# Patient Record
Sex: MALE | Race: WHITE | NOT HISPANIC OR LATINO | Employment: FULL TIME | URBAN - METROPOLITAN AREA
[De-identification: names, ages, dates, MRNs, and addresses within clinical notes are randomized per-mention and may not be internally consistent; named-entity substitution may affect disease eponyms.]

---

## 2017-01-30 ENCOUNTER — CLINICAL SUPPORT (OUTPATIENT)
Dept: ELECTROPHYSIOLOGY | Facility: CLINIC | Age: 62
End: 2017-01-30
Payer: COMMERCIAL

## 2017-01-30 DIAGNOSIS — Z95.0 CARDIAC PACEMAKER IN SITU: ICD-10-CM

## 2017-01-30 DIAGNOSIS — I44.1 MOBITZ TYPE II ATRIOVENTRICULAR BLOCK: ICD-10-CM

## 2017-01-30 PROCEDURE — 93296 REM INTERROG EVL PM/IDS: CPT | Mod: PBBFAC | Performed by: INTERNAL MEDICINE

## 2017-01-30 PROCEDURE — 93294 REM INTERROG EVL PM/LDLS PM: CPT | Mod: S$GLB,,, | Performed by: INTERNAL MEDICINE

## 2017-03-06 ENCOUNTER — TELEPHONE (OUTPATIENT)
Dept: FAMILY MEDICINE | Facility: CLINIC | Age: 62
End: 2017-03-06

## 2017-03-06 DIAGNOSIS — Z00.00 ROUTINE MEDICAL EXAM: Primary | ICD-10-CM

## 2017-03-06 NOTE — TELEPHONE ENCOUNTER
----- Message from Sharmaine Barlow sent at 3/6/2017 11:13 AM CST -----  Patient would like annual blood work prior to appt 4/3/17. Please call at [phone] Thank you!

## 2017-03-23 ENCOUNTER — LAB VISIT (OUTPATIENT)
Dept: LAB | Facility: HOSPITAL | Age: 62
End: 2017-03-23
Attending: INTERNAL MEDICINE
Payer: COMMERCIAL

## 2017-03-23 DIAGNOSIS — Z00.00 ROUTINE MEDICAL EXAM: ICD-10-CM

## 2017-03-23 LAB
ALBUMIN SERPL BCP-MCNC: 3.9 G/DL
ALP SERPL-CCNC: 89 U/L
ALT SERPL W/O P-5'-P-CCNC: 25 U/L
ANION GAP SERPL CALC-SCNC: 11 MMOL/L
AST SERPL-CCNC: 25 U/L
BASOPHILS # BLD AUTO: 0.03 K/UL
BASOPHILS NFR BLD: 0.6 %
BILIRUB SERPL-MCNC: 1 MG/DL
BUN SERPL-MCNC: 11 MG/DL
CALCIUM SERPL-MCNC: 9.6 MG/DL
CHLORIDE SERPL-SCNC: 105 MMOL/L
CHOLEST/HDLC SERPL: 2.7 {RATIO}
CO2 SERPL-SCNC: 25 MMOL/L
COMPLEXED PSA SERPL-MCNC: 1.3 NG/ML
CREAT SERPL-MCNC: 1.1 MG/DL
DIFFERENTIAL METHOD: NORMAL
EOSINOPHIL # BLD AUTO: 0.2 K/UL
EOSINOPHIL NFR BLD: 4.4 %
ERYTHROCYTE [DISTWIDTH] IN BLOOD BY AUTOMATED COUNT: 13.8 %
EST. GFR  (AFRICAN AMERICAN): >60 ML/MIN/1.73 M^2
EST. GFR  (NON AFRICAN AMERICAN): >60 ML/MIN/1.73 M^2
GLUCOSE SERPL-MCNC: 101 MG/DL
HCT VFR BLD AUTO: 46.8 %
HDL/CHOLESTEROL RATIO: 37.6 %
HDLC SERPL-MCNC: 194 MG/DL
HDLC SERPL-MCNC: 73 MG/DL
HGB BLD-MCNC: 16 G/DL
LDLC SERPL CALC-MCNC: 95.2 MG/DL
LYMPHOCYTES # BLD AUTO: 1.6 K/UL
LYMPHOCYTES NFR BLD: 28.7 %
MCH RBC QN AUTO: 30.4 PG
MCHC RBC AUTO-ENTMCNC: 34.2 %
MCV RBC AUTO: 89 FL
MONOCYTES # BLD AUTO: 0.6 K/UL
MONOCYTES NFR BLD: 10.7 %
NEUTROPHILS # BLD AUTO: 3 K/UL
NEUTROPHILS NFR BLD: 55.4 %
NONHDLC SERPL-MCNC: 121 MG/DL
PLATELET # BLD AUTO: 223 K/UL
PMV BLD AUTO: 11.8 FL
POTASSIUM SERPL-SCNC: 4.7 MMOL/L
PROT SERPL-MCNC: 7.5 G/DL
RBC # BLD AUTO: 5.26 M/UL
SODIUM SERPL-SCNC: 141 MMOL/L
TRIGL SERPL-MCNC: 129 MG/DL
TSH SERPL DL<=0.005 MIU/L-ACNC: 0.94 UIU/ML
WBC # BLD AUTO: 5.43 K/UL

## 2017-03-23 PROCEDURE — 84443 ASSAY THYROID STIM HORMONE: CPT

## 2017-03-23 PROCEDURE — 80061 LIPID PANEL: CPT

## 2017-03-23 PROCEDURE — 84153 ASSAY OF PSA TOTAL: CPT

## 2017-03-23 PROCEDURE — 85025 COMPLETE CBC W/AUTO DIFF WBC: CPT

## 2017-03-23 PROCEDURE — 36415 COLL VENOUS BLD VENIPUNCTURE: CPT | Mod: PO

## 2017-03-23 PROCEDURE — 80053 COMPREHEN METABOLIC PANEL: CPT

## 2017-04-03 ENCOUNTER — OFFICE VISIT (OUTPATIENT)
Dept: FAMILY MEDICINE | Facility: CLINIC | Age: 62
End: 2017-04-03
Payer: COMMERCIAL

## 2017-04-03 VITALS
BODY MASS INDEX: 21.87 KG/M2 | HEIGHT: 74 IN | TEMPERATURE: 99 F | SYSTOLIC BLOOD PRESSURE: 122 MMHG | DIASTOLIC BLOOD PRESSURE: 72 MMHG | OXYGEN SATURATION: 98 % | WEIGHT: 170.44 LBS | HEART RATE: 80 BPM

## 2017-04-03 DIAGNOSIS — Z12.5 SCREENING FOR PROSTATE CANCER: ICD-10-CM

## 2017-04-03 DIAGNOSIS — Z95.0 CARDIAC PACEMAKER: ICD-10-CM

## 2017-04-03 DIAGNOSIS — Z00.00 ROUTINE MEDICAL EXAM: Primary | ICD-10-CM

## 2017-04-03 DIAGNOSIS — I44.1 SECOND DEGREE AV BLOCK: ICD-10-CM

## 2017-04-03 DIAGNOSIS — Z87.19 HISTORY OF DIVERTICULITIS: ICD-10-CM

## 2017-04-03 DIAGNOSIS — E78.5 HYPERLIPIDEMIA, UNSPECIFIED HYPERLIPIDEMIA TYPE: ICD-10-CM

## 2017-04-03 DIAGNOSIS — K21.9 GASTROESOPHAGEAL REFLUX DISEASE, ESOPHAGITIS PRESENCE NOT SPECIFIED: ICD-10-CM

## 2017-04-03 PROCEDURE — 99396 PREV VISIT EST AGE 40-64: CPT | Mod: S$GLB,,, | Performed by: INTERNAL MEDICINE

## 2017-04-03 PROCEDURE — 99999 PR PBB SHADOW E&M-EST. PATIENT-LVL III: CPT | Mod: PBBFAC,,, | Performed by: INTERNAL MEDICINE

## 2017-04-03 NOTE — PROGRESS NOTES
Chief complaint: Physical exam, [facility] care    61-year-old white male new to me his PCP is retired.  From and health maintenance standpoint we reviewed his recent labs.  His colonoscopy was normal in 2013 with a 10 year follow-up since previous polyps were hyperplastic.  He has had episodes of diverticulitis with central lower abdominal pain and we discussed probably best for him to come to the clinic when he gets another episode so we can confirm that it is not other conditions.  He gets occasional reflux we are long conversation regarding Zantac as well as the have the physiology of reflux and reflux precautions.  His labs are unremarkable and his lipids are well controlled.  He has no angina symptoms.    ROS:   CONST: weight stable. EYES: no vision change. ENT: no sore throat. CV: no chest pain w/ exertion. RESP: no shortness of breath. GI: no nausea, vomiting, diarrhea. No dysphagia. : no urinary issues. MUSCULOSKELETAL: no new myalgias or arthralgias. SKIN: no new changes. NEURO: no focal deficits. PSYCH: no new issues. ENDOCRINE: no polyuria. HEME: no lymph nodes. ALLERGY: no general pruritis.          Past Medical History:   Diagnosis Date    2:1 Second degree AV block 3/5/2013    Cardiac pacemaker 3/6/2013    Heart block 3/6/13    S/p DC PM    Hyperlipidemia     Personal history of colonic polyps 12/30/2013    Normal 12/13 ---Repeat colonoscopy in 10 years for screening purposes. (Previous polyps were both hyperplastic)   Diverticulitis  Occasional GERD          Past Surgical History:   Procedure Laterality Date    inguinal hernia repair      x 2    INSERT / REPLACE / REMOVE PACEMAKER  3/6/13    S/p DC PM       Social History     Social History    Marital status:      Spouse name: N/A    Number of children: 3 boys in college    Years of education: N/A     Occupational History    realator     Social History Main Topics    Smoking status: Former Smoker     Types: Cigars     Quit date:  7/23/1973    Smokeless tobacco: Never Used    Alcohol use 6.0 oz/week     5 Glasses of wine, 5 Cans of beer per week      Comment: Daily    Drug use: No    Sexual activity: Not on file     Other Topics Concern    Not on file     Social History Narrative       family history includes Cancer in his father and mother; Diabetes in his mother.      Gen: no distress  EYES: conjunctiva clear, non-icteric, PERRL  ENT: nose clear, nasal mucosa normal, oropharynx clear and moist, teeth good  NECK:supple, thyroid non-palpable  RESP: effort is good, lungs clear  CV: heart RRR w/o murmur, gallops or rubs; no carotid bruits, no edema  GI: abdomen soft, non-distended, non-tender, no hepatosplenomegaly  MS: gait normal, no clubbing or cyanosis of the digits  SKIN: no rashes, warm to touch    Marcos was seen today for annual exam.    Diagnoses and all orders for this visit:    Routine medical exam, up-to-date on age appropriate cancer screening, remain active, he can call his insurance about the shingles vaccine, Pneumovax and so forth after 65    Screening for prostate cancer, normal PSA    Hyperlipidemia, unspecified hyperlipidemia type, controlled on statin    Gastroesophageal reflux disease, esophagitis presence not specified, Zantac and reflux precautions discussed    Cardiac pacemaker, stable    2:1 Second degree AV block    History of diverticulitis, discussed symptoms and if episodes are frequent and recurrent, there is a place for referral to colon rectal surgery

## 2017-05-02 ENCOUNTER — CLINICAL SUPPORT (OUTPATIENT)
Dept: ELECTROPHYSIOLOGY | Facility: CLINIC | Age: 62
End: 2017-05-02
Payer: COMMERCIAL

## 2017-05-02 DIAGNOSIS — I44.1 MOBITZ TYPE II ATRIOVENTRICULAR BLOCK: ICD-10-CM

## 2017-05-02 DIAGNOSIS — Z95.0 CARDIAC PACEMAKER IN SITU: ICD-10-CM

## 2017-05-02 PROCEDURE — 93296 REM INTERROG EVL PM/IDS: CPT | Mod: S$GLB,,, | Performed by: INTERNAL MEDICINE

## 2017-05-02 PROCEDURE — 93294 REM INTERROG EVL PM/LDLS PM: CPT | Mod: S$GLB,,, | Performed by: INTERNAL MEDICINE

## 2017-05-17 DIAGNOSIS — E78.5 HYPERLIPIDEMIA, UNSPECIFIED HYPERLIPIDEMIA TYPE: ICD-10-CM

## 2017-05-19 RX ORDER — SIMVASTATIN 40 MG/1
40 TABLET, FILM COATED ORAL DAILY
Qty: 90 TABLET | Refills: 4 | Status: SHIPPED | OUTPATIENT
Start: 2017-05-19 | End: 2018-05-21 | Stop reason: SDUPTHER

## 2017-08-07 DIAGNOSIS — I44.1 AV BLOCK, 2ND DEGREE: Primary | ICD-10-CM

## 2017-08-08 ENCOUNTER — CLINICAL SUPPORT (OUTPATIENT)
Dept: ELECTROPHYSIOLOGY | Facility: CLINIC | Age: 62
End: 2017-08-08
Payer: COMMERCIAL

## 2017-08-08 ENCOUNTER — OFFICE VISIT (OUTPATIENT)
Dept: ELECTROPHYSIOLOGY | Facility: CLINIC | Age: 62
End: 2017-08-08
Payer: COMMERCIAL

## 2017-08-08 ENCOUNTER — HOSPITAL ENCOUNTER (OUTPATIENT)
Dept: CARDIOLOGY | Facility: CLINIC | Age: 62
Discharge: HOME | End: 2017-08-08
Payer: COMMERCIAL

## 2017-08-08 VITALS
WEIGHT: 172.63 LBS | BODY MASS INDEX: 22.15 KG/M2 | SYSTOLIC BLOOD PRESSURE: 142 MMHG | DIASTOLIC BLOOD PRESSURE: 78 MMHG | HEIGHT: 74 IN | HEART RATE: 59 BPM

## 2017-08-08 DIAGNOSIS — Z95.0 CARDIAC PACEMAKER: ICD-10-CM

## 2017-08-08 DIAGNOSIS — I44.1 AV BLOCK, 2ND DEGREE: ICD-10-CM

## 2017-08-08 DIAGNOSIS — I44.1 SECOND DEGREE HEART BLOCK: ICD-10-CM

## 2017-08-08 DIAGNOSIS — I44.1 SECOND DEGREE AV BLOCK: Primary | ICD-10-CM

## 2017-08-08 DIAGNOSIS — Z95.0 CARDIAC PACEMAKER IN SITU: ICD-10-CM

## 2017-08-08 PROCEDURE — 3008F BODY MASS INDEX DOCD: CPT | Mod: S$GLB,,, | Performed by: INTERNAL MEDICINE

## 2017-08-08 PROCEDURE — 93000 ELECTROCARDIOGRAM COMPLETE: CPT | Mod: S$GLB,,, | Performed by: INTERNAL MEDICINE

## 2017-08-08 PROCEDURE — 99214 OFFICE O/P EST MOD 30 MIN: CPT | Mod: S$GLB,,, | Performed by: INTERNAL MEDICINE

## 2017-08-08 PROCEDURE — 93280 PM DEVICE PROGR EVAL DUAL: CPT | Mod: S$GLB,,, | Performed by: INTERNAL MEDICINE

## 2017-08-08 PROCEDURE — 99999 PR PBB SHADOW E&M-EST. PATIENT-LVL III: CPT | Mod: PBBFAC,,, | Performed by: INTERNAL MEDICINE

## 2017-08-08 NOTE — PROGRESS NOTES
Subjective:    Patient ID:  Marcos Elaine is a 61 y.o. male who presents for follow-up of av block second degree      61 yoM 2:1 AVB s/p DC PM here for long term follow up. No complaints no events since his last visit. The patient denies interval chest pain, sob, palpitations, syncope, near syncope.     Interval history: 24% RV pacing. The patient denies interval chest pain, sob, palpitations, syncope, near syncope.     Echo 2013:  CONCLUSIONS   1 - Normal left ventricular function (EF 65%).   2 - Trivial tricuspid regurgitation.   3 - Intermediate central venous pressure.     Past Medical History:  3/5/2013: 2:1 Second degree AV block  3/6/2013: Cardiac pacemaker  3/6/13: Heart block      Comment: S/p DC PM  No date: Hyperlipidemia  12/30/2013: Personal history of colonic polyps      Comment: Normal 12/13 ---Repeat colonoscopy in 10 years               for screening purposes. (Previous polyps were                both hyperplastic)    Past Surgical History:  No date: inguinal hernia repair      Comment: x 2  3/6/13: INSERT / REPLACE / REMOVE PACEMAKER      Comment: S/p DC PM    Social History    Marital status:              Spouse name:                       Years of education:                 Number of children:               Occupational History    None on file    Social History Main Topics    Smoking status: Former Smoker                                                                Packs/day: 0.00      Years: 0.00           Types: Cigars       Quit date: 7/23/1973    Smokeless tobacco: Never Used                        Alcohol use: Yes           6.0 oz/week       Glasses of wine: 5, Cans of beer: 5 per week       Comment: Daily    Drug use: No              Sexual activity: Not on file          Other Topics            Concern    None on file    Social History Narrative    None on file    Review of patient's family history indicates:    Diabetes                       Mother                    Cancer                          Mother                    Cancer                         Father                          Review of Systems   Constitution: Negative.   HENT: Negative.    Eyes: Negative.    Cardiovascular: Negative.  Negative for chest pain, dyspnea on exertion, irregular heartbeat, leg swelling, near-syncope, palpitations and syncope.   Respiratory: Negative.    Endocrine: Negative.    Hematologic/Lymphatic: Negative.    Skin: Negative.    Musculoskeletal: Negative.    Gastrointestinal: Negative.    Genitourinary: Negative.    Neurological: Negative.    Psychiatric/Behavioral: Negative.    Allergic/Immunologic: Negative.         Objective:    Physical Exam   Constitutional: He is oriented to person, place, and time. He appears well-developed and well-nourished. No distress.   HENT:   Head: Normocephalic and atraumatic.   Eyes: Conjunctivae and EOM are normal. Pupils are equal, round, and reactive to light. Right eye exhibits no discharge. Left eye exhibits no discharge.   Neck: Normal range of motion. Neck supple. No JVD present. No thyromegaly present.   Cardiovascular: Normal rate, regular rhythm and normal heart sounds.    No murmur heard.  Pulmonary/Chest: Effort normal and breath sounds normal. No respiratory distress. He has no wheezes.   L upper chest wound overlying generator   Abdominal: Soft. Bowel sounds are normal. He exhibits no distension. There is no tenderness.   Musculoskeletal: Normal range of motion. He exhibits no edema.   Neurological: He is alert and oriented to person, place, and time. No cranial nerve deficit.   Skin: Skin is warm and dry. No rash noted. He is not diaphoretic. No erythema.   Psychiatric: He has a normal mood and affect. His behavior is normal. Judgment and thought content normal.   Vitals reviewed.      ECG: NSR , RBBB      Assessment:       1. 2:1 Second degree AV block    2. Cardiac pacemaker         Plan:       61 yoM advanced AV block s/p DC PM here for routine  follow up. Normal DC PM function with no arrhythmias. I discussed routine device follow up including quarterly to bi-annual device checks for device function as well as yearly follow up in the EP clinic. The patient  was advised to call with any concerns regarding their device. Device clinic follow up as scheduled. RTC 1y

## 2017-08-21 DIAGNOSIS — Z95.0 CARDIAC PACEMAKER IN SITU: Primary | ICD-10-CM

## 2017-09-06 ENCOUNTER — PATIENT MESSAGE (OUTPATIENT)
Dept: ELECTROPHYSIOLOGY | Facility: CLINIC | Age: 62
End: 2017-09-06

## 2017-10-10 ENCOUNTER — TELEPHONE (OUTPATIENT)
Dept: ADMINISTRATIVE | Facility: HOSPITAL | Age: 62
End: 2017-10-10

## 2017-10-10 NOTE — TELEPHONE ENCOUNTER
Fax received [facility], [place] for Influenza Vaccine.  Updated health maintenance and sent to scanning.

## 2017-11-09 ENCOUNTER — CLINICAL SUPPORT (OUTPATIENT)
Dept: ELECTROPHYSIOLOGY | Facility: CLINIC | Age: 62
End: 2017-11-09
Payer: COMMERCIAL

## 2017-11-09 DIAGNOSIS — Z95.0 CARDIAC PACEMAKER IN SITU: ICD-10-CM

## 2017-11-10 PROCEDURE — 93294 REM INTERROG EVL PM/LDLS PM: CPT | Mod: ,,, | Performed by: INTERNAL MEDICINE

## 2018-01-19 ENCOUNTER — OFFICE VISIT (OUTPATIENT)
Dept: FAMILY MEDICINE | Facility: CLINIC | Age: 63
End: 2018-01-19
Payer: COMMERCIAL

## 2018-01-19 VITALS
BODY MASS INDEX: 21.93 KG/M2 | WEIGHT: 170.88 LBS | TEMPERATURE: 99 F | SYSTOLIC BLOOD PRESSURE: 126 MMHG | HEIGHT: 74 IN | HEART RATE: 75 BPM | DIASTOLIC BLOOD PRESSURE: 80 MMHG | OXYGEN SATURATION: 99 %

## 2018-01-19 DIAGNOSIS — E78.5 HYPERLIPIDEMIA, UNSPECIFIED HYPERLIPIDEMIA TYPE: ICD-10-CM

## 2018-01-19 DIAGNOSIS — Z86.010 PERSONAL HISTORY OF COLONIC POLYPS: ICD-10-CM

## 2018-01-19 DIAGNOSIS — J06.9 URI, ACUTE: Primary | ICD-10-CM

## 2018-01-19 PROCEDURE — 99214 OFFICE O/P EST MOD 30 MIN: CPT | Mod: S$GLB,,, | Performed by: INTERNAL MEDICINE

## 2018-01-19 PROCEDURE — 99999 PR PBB SHADOW E&M-EST. PATIENT-LVL III: CPT | Mod: PBBFAC,,, | Performed by: INTERNAL MEDICINE

## 2018-01-19 NOTE — PROGRESS NOTES
Chief complaint: Cough and congestion    62-year-old white male was exposed to the cold 3 days ago.  His wife had an illness similar previous.  He said some slight chills but nothing suspicious for fever no documented fever, no body aches.  He has some head congestion and some associated sneezing due to rhinitis and postnasal drip with associated cough.  He has not really taken any medications.    ROS:   CONST: weight stable. EYES: no vision change. ENT: no sore throat. CV: no chest pain w/ exertion. RESP: no shortness of breath. GI: no nausea, vomiting, diarrhea. No dysphagia. : no urinary issues. MUSCULOSKELETAL: no new myalgias or arthralgias. SKIN: no new changes. NEURO: no focal deficits. PSYCH: no new issues. ENDOCRINE: no polyuria. HEME: no lymph nodes. ALLERGY: no general pruritis.          Past Medical History:   Diagnosis Date    2:1 Second degree AV block 3/5/2013    Cardiac pacemaker 3/6/2013    Heart block 3/6/13    S/p DC PM    Hyperlipidemia     Personal history of colonic polyps 12/30/2013    Normal 12/13 ---Repeat colonoscopy in 10 years for screening purposes. (Previous polyps were both hyperplastic)   Diverticulitis  Occasional GERD          Past Surgical History:   Procedure Laterality Date    inguinal hernia repair      x 2    INSERT / REPLACE / REMOVE PACEMAKER  3/6/13    S/p DC PM       Social History     Social History    Marital status:      Spouse name: N/A    Number of children: 3 boys in college    Years of education: N/A     Occupational History    realator     Social History Main Topics    Smoking status: Former Smoker     Types: Cigars     Quit date: 7/23/1973    Smokeless tobacco: Never Used    Alcohol use 6.0 oz/week     5 Glasses of wine, 5 Cans of beer per week      Comment: Daily    Drug use: No    Sexual activity: Not on file     Other Topics Concern    Not on file     Social History Narrative       family history includes Cancer in his father and  mother; Diabetes in his mother.      Gen: no distress  EYES: conjunctiva clear, non-icteric, PERRL  ENT: nose congested, nasal mucosa red, oropharynx slightly red and moist, teeth good  NECK:supple, thyroid non-palpable, no cervical lymph nodes  RESP: effort is good, lungs clear  CV: heart RRR w/o murmur, gallops or rubs; no carotid bruits, no edema  GI: abdomen soft, non-distended, non-tender  MS: gait normal, no clubbing or cyanosis of the digits  SKIN: no rashes, warm to touch, no sinus pain to touch    Marcos was seen today for cough, sinus problem and sneezing.    Diagnoses and all orders for this visit:    URI, acute , viral, minimal severity, discuss Claritin in the morning, Chlor-Trimeton at night else him for cough all of these are optional based on severity of symptoms.    Hyperlipidemia, unspecified hyperlipidemia type, labs reviewed, good control, yearly blood work will be due in March    Personal history of colonic polyps, colonoscopy report reviewed and apparently his prior polyps were hyperplastic so he can go 10 years from prior

## 2018-02-19 ENCOUNTER — CLINICAL SUPPORT (OUTPATIENT)
Dept: ELECTROPHYSIOLOGY | Facility: CLINIC | Age: 63
End: 2018-02-19
Payer: COMMERCIAL

## 2018-02-19 DIAGNOSIS — Z95.0 CARDIAC PACEMAKER IN SITU: ICD-10-CM

## 2018-02-19 PROCEDURE — 93296 REM INTERROG EVL PM/IDS: CPT | Mod: S$GLB,,, | Performed by: INTERNAL MEDICINE

## 2018-02-19 PROCEDURE — 93294 REM INTERROG EVL PM/LDLS PM: CPT | Mod: S$GLB,,, | Performed by: INTERNAL MEDICINE

## 2018-02-20 ENCOUNTER — TELEPHONE (OUTPATIENT)
Dept: FAMILY MEDICINE | Facility: CLINIC | Age: 63
End: 2018-02-20

## 2018-02-20 DIAGNOSIS — Z00.00 ROUTINE MEDICAL EXAM: Primary | ICD-10-CM

## 2018-02-20 NOTE — TELEPHONE ENCOUNTER
----- Message from Ruth Estrada sent at 2/16/2018  4:22 PM CST -----  Contact: Self  Pt requesting blood work before appt on 4/4/2018. [phone]

## 2018-02-28 ENCOUNTER — PATIENT MESSAGE (OUTPATIENT)
Dept: FAMILY MEDICINE | Facility: CLINIC | Age: 63
End: 2018-02-28

## 2018-02-28 ENCOUNTER — TELEPHONE (OUTPATIENT)
Dept: FAMILY MEDICINE | Facility: CLINIC | Age: 63
End: 2018-02-28

## 2018-02-28 NOTE — TELEPHONE ENCOUNTER
Attempted to contact patient to schedule labs appointment. No answer left message on voicemail to return call to clinic and will send message via my [facility].

## 2018-03-26 ENCOUNTER — LAB VISIT (OUTPATIENT)
Dept: LAB | Facility: HOSPITAL | Age: 63
End: 2018-03-26
Attending: INTERNAL MEDICINE
Payer: COMMERCIAL

## 2018-03-26 DIAGNOSIS — Z00.00 ROUTINE MEDICAL EXAM: ICD-10-CM

## 2018-03-26 LAB
ALBUMIN SERPL BCP-MCNC: 4.1 G/DL
ALP SERPL-CCNC: 80 U/L
ALT SERPL W/O P-5'-P-CCNC: 20 U/L
ANION GAP SERPL CALC-SCNC: 9 MMOL/L
AST SERPL-CCNC: 22 U/L
BASOPHILS # BLD AUTO: 0.02 K/UL
BASOPHILS NFR BLD: 0.4 %
BILIRUB SERPL-MCNC: 1 MG/DL
BUN SERPL-MCNC: 13 MG/DL
CALCIUM SERPL-MCNC: 9.7 MG/DL
CHLORIDE SERPL-SCNC: 103 MMOL/L
CHOLEST SERPL-MCNC: 175 MG/DL
CHOLEST/HDLC SERPL: 2.5 {RATIO}
CO2 SERPL-SCNC: 28 MMOL/L
COMPLEXED PSA SERPL-MCNC: 1.4 NG/ML
CREAT SERPL-MCNC: 1 MG/DL
DIFFERENTIAL METHOD: NORMAL
EOSINOPHIL # BLD AUTO: 0.1 K/UL
EOSINOPHIL NFR BLD: 2.4 %
ERYTHROCYTE [DISTWIDTH] IN BLOOD BY AUTOMATED COUNT: 13.8 %
EST. GFR  (AFRICAN AMERICAN): >60 ML/MIN/1.73 M^2
EST. GFR  (NON AFRICAN AMERICAN): >60 ML/MIN/1.73 M^2
GLUCOSE SERPL-MCNC: 110 MG/DL
HCT VFR BLD AUTO: 47.2 %
HDLC SERPL-MCNC: 70 MG/DL
HDLC SERPL: 40 %
HGB BLD-MCNC: 15.8 G/DL
IMM GRANULOCYTES # BLD AUTO: 0.01 K/UL
IMM GRANULOCYTES NFR BLD AUTO: 0.2 %
LDLC SERPL CALC-MCNC: 81.6 MG/DL
LYMPHOCYTES # BLD AUTO: 1.2 K/UL
LYMPHOCYTES NFR BLD: 24.7 %
MCH RBC QN AUTO: 29.8 PG
MCHC RBC AUTO-ENTMCNC: 33.5 G/DL
MCV RBC AUTO: 89 FL
MONOCYTES # BLD AUTO: 0.5 K/UL
MONOCYTES NFR BLD: 9.4 %
NEUTROPHILS # BLD AUTO: 3.2 K/UL
NEUTROPHILS NFR BLD: 62.9 %
NONHDLC SERPL-MCNC: 105 MG/DL
NRBC BLD-RTO: 0 /100 WBC
PLATELET # BLD AUTO: 228 K/UL
PMV BLD AUTO: 11.7 FL
POTASSIUM SERPL-SCNC: 4.4 MMOL/L
PROT SERPL-MCNC: 7.4 G/DL
RBC # BLD AUTO: 5.3 M/UL
SODIUM SERPL-SCNC: 140 MMOL/L
TRIGL SERPL-MCNC: 117 MG/DL
TSH SERPL DL<=0.005 MIU/L-ACNC: 1.06 UIU/ML
WBC # BLD AUTO: 5.02 K/UL

## 2018-03-26 PROCEDURE — 36415 COLL VENOUS BLD VENIPUNCTURE: CPT | Mod: PO

## 2018-03-26 PROCEDURE — 84443 ASSAY THYROID STIM HORMONE: CPT

## 2018-03-26 PROCEDURE — 80061 LIPID PANEL: CPT

## 2018-03-26 PROCEDURE — 80053 COMPREHEN METABOLIC PANEL: CPT

## 2018-03-26 PROCEDURE — 84153 ASSAY OF PSA TOTAL: CPT

## 2018-03-26 PROCEDURE — 85025 COMPLETE CBC W/AUTO DIFF WBC: CPT

## 2018-04-04 ENCOUNTER — OFFICE VISIT (OUTPATIENT)
Dept: FAMILY MEDICINE | Facility: CLINIC | Age: 63
End: 2018-04-04
Payer: COMMERCIAL

## 2018-04-04 VITALS
BODY MASS INDEX: 21.56 KG/M2 | HEIGHT: 74 IN | HEART RATE: 70 BPM | WEIGHT: 168 LBS | OXYGEN SATURATION: 99 % | TEMPERATURE: 99 F | DIASTOLIC BLOOD PRESSURE: 80 MMHG | SYSTOLIC BLOOD PRESSURE: 122 MMHG

## 2018-04-04 DIAGNOSIS — I44.1 SECOND DEGREE AV BLOCK: ICD-10-CM

## 2018-04-04 DIAGNOSIS — Z12.5 SCREENING FOR PROSTATE CANCER: ICD-10-CM

## 2018-04-04 DIAGNOSIS — K21.9 GASTROESOPHAGEAL REFLUX DISEASE, ESOPHAGITIS PRESENCE NOT SPECIFIED: ICD-10-CM

## 2018-04-04 DIAGNOSIS — E78.5 HYPERLIPIDEMIA, UNSPECIFIED HYPERLIPIDEMIA TYPE: ICD-10-CM

## 2018-04-04 DIAGNOSIS — Z95.0 CARDIAC PACEMAKER: ICD-10-CM

## 2018-04-04 DIAGNOSIS — Z87.19 HISTORY OF DIVERTICULITIS: ICD-10-CM

## 2018-04-04 DIAGNOSIS — Z86.010 PERSONAL HISTORY OF COLONIC POLYPS: ICD-10-CM

## 2018-04-04 DIAGNOSIS — Z00.00 ROUTINE MEDICAL EXAM: Primary | ICD-10-CM

## 2018-04-04 PROCEDURE — 99999 PR PBB SHADOW E&M-EST. PATIENT-LVL III: CPT | Mod: PBBFAC,,, | Performed by: INTERNAL MEDICINE

## 2018-04-04 PROCEDURE — 99396 PREV VISIT EST AGE 40-64: CPT | Mod: S$GLB,,, | Performed by: INTERNAL MEDICINE

## 2018-04-04 NOTE — PROGRESS NOTES
Chief complaint: Physical exam,     62-year-old white male.  From and health maintenance standpoint we reviewed his  labs.  His colonoscopy was normal in 2013 with a 10 year follow-up since previous polyps were hyperplastic.  He has had episodes of diverticulitis with central lower abdominal pain and we discussed probably best for him to come to the clinic when he gets another episode so we can confirm that it is not other conditions.  He gets occasional reflux we are long conversation regarding Zantac as well as the have the physiology of reflux and reflux precautions.  His labs are unremarkable and his lipids are well controlled.  He has no angina symptoms.    ROS:   CONST: weight stable. EYES: no vision change. ENT: no sore throat. CV: no chest pain w/ exertion. RESP: no shortness of breath. GI: no nausea, vomiting, diarrhea. No dysphagia. : no urinary issues. MUSCULOSKELETAL: no new myalgias or arthralgias. SKIN: no new changes. NEURO: no focal deficits. PSYCH: no new issues. ENDOCRINE: no polyuria. HEME: no lymph nodes. ALLERGY: no general pruritis.          Past Medical History:   Diagnosis Date    2:1 Second degree AV block 3/5/2013    Cardiac pacemaker 3/6/2013    Heart block 3/6/13    S/p DC PM    Hyperlipidemia     Personal history of colonic polyps 12/30/2013    Normal 12/13 ---Repeat colonoscopy in 10 years for screening purposes. 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      Gen: no distress  EYES: conjunctiva clear, non-icteric, PERRL  ENT: nose clear, nasal mucosa normal, oropharynx clear and moist, teeth good  NECK:supple, thyroid non-palpable  RESP: effort is good, lungs clear  CV: heart RRR w/o murmur, gallops or rubs; no carotid bruits, no edema  GI: abdomen soft, non-distended, non-tender, no hepatosplenomegaly  MS: gait normal, no clubbing or cyanosis of the digits  SKIN: no rashes, warm to touch    Marcos was seen today for annual exam.    Diagnoses and all orders for this visit:    Routine medical exam, up-to-date on age appropriate cancer screening, remain active, he can call his insurance about the shingles vaccine, Pneumovax and so forth after 65    Screening for prostate cancer, normal PSA    Hyperlipidemia, unspecified hyperlipidemia type, controlled on statin    Gastroesophageal reflux disease, esophagitis presence not specified, Zantac and reflux precautions discussed    Cardiac pacemaker, stable    2:1 Second degree AV block    History of diverticulitis, discussed symptoms and if episodes are frequent and recurrent, there is a place for referral to colon rectal surgery     "This note will not be shared with the patient."  "

## 2018-05-21 DIAGNOSIS — E78.5 HYPERLIPIDEMIA, UNSPECIFIED HYPERLIPIDEMIA TYPE: ICD-10-CM

## 2018-05-21 RX ORDER — SIMVASTATIN 40 MG/1
TABLET, FILM COATED ORAL
Qty: 90 TABLET | Refills: 1 | Status: SHIPPED | OUTPATIENT
Start: 2018-05-21 | End: 2018-11-13 | Stop reason: SDUPTHER

## 2018-05-23 ENCOUNTER — CLINICAL SUPPORT (OUTPATIENT)
Dept: ELECTROPHYSIOLOGY | Facility: CLINIC | Age: 63
End: 2018-05-23
Payer: COMMERCIAL

## 2018-05-23 DIAGNOSIS — Z95.0 CARDIAC PACEMAKER IN SITU: ICD-10-CM

## 2018-05-23 PROCEDURE — 93294 REM INTERROG EVL PM/LDLS PM: CPT | Mod: S$GLB,,, | Performed by: INTERNAL MEDICINE

## 2018-05-23 PROCEDURE — 93296 REM INTERROG EVL PM/IDS: CPT | Mod: S$GLB,,, | Performed by: INTERNAL MEDICINE

## 2018-05-30 DIAGNOSIS — I44.1 SECOND DEGREE AV BLOCK: ICD-10-CM

## 2018-05-30 DIAGNOSIS — Z95.0 CARDIAC PACEMAKER IN SITU: Primary | ICD-10-CM

## 2018-06-14 DIAGNOSIS — Z11.59 NEED FOR HEPATITIS C SCREENING TEST: ICD-10-CM

## 2018-08-24 ENCOUNTER — TELEPHONE (OUTPATIENT)
Dept: ELECTROPHYSIOLOGY | Facility: CLINIC | Age: 63
End: 2018-08-24

## 2018-08-24 DIAGNOSIS — I44.30 AV BLOCK: Primary | ICD-10-CM

## 2018-08-28 ENCOUNTER — CLINICAL SUPPORT (OUTPATIENT)
Dept: ELECTROPHYSIOLOGY | Facility: CLINIC | Age: 63
End: 2018-08-28
Attending: INTERNAL MEDICINE
Payer: COMMERCIAL

## 2018-08-28 ENCOUNTER — HOSPITAL ENCOUNTER (OUTPATIENT)
Dept: CARDIOLOGY | Facility: CLINIC | Age: 63
Discharge: HOME | End: 2018-08-28
Payer: COMMERCIAL

## 2018-08-28 ENCOUNTER — OFFICE VISIT (OUTPATIENT)
Dept: ELECTROPHYSIOLOGY | Facility: CLINIC | Age: 63
End: 2018-08-28
Payer: COMMERCIAL

## 2018-08-28 VITALS
WEIGHT: 175.06 LBS | HEART RATE: 62 BPM | BODY MASS INDEX: 22.47 KG/M2 | DIASTOLIC BLOOD PRESSURE: 84 MMHG | HEIGHT: 74 IN | SYSTOLIC BLOOD PRESSURE: 148 MMHG

## 2018-08-28 DIAGNOSIS — I44.1 SECOND DEGREE AV BLOCK: ICD-10-CM

## 2018-08-28 DIAGNOSIS — Z95.0 CARDIAC PACEMAKER: Primary | ICD-10-CM

## 2018-08-28 DIAGNOSIS — Z95.0 CARDIAC PACEMAKER IN SITU: ICD-10-CM

## 2018-08-28 DIAGNOSIS — I44.30 AV BLOCK: ICD-10-CM

## 2018-08-28 PROCEDURE — 99214 OFFICE O/P EST MOD 30 MIN: CPT | Mod: S$GLB,,, | Performed by: INTERNAL MEDICINE

## 2018-08-28 PROCEDURE — 99999 PR PBB SHADOW E&M-EST. PATIENT-LVL II: CPT | Mod: PBBFAC,,, | Performed by: INTERNAL MEDICINE

## 2018-08-28 PROCEDURE — 93280 PM DEVICE PROGR EVAL DUAL: CPT | Mod: ,,, | Performed by: INTERNAL MEDICINE

## 2018-08-28 PROCEDURE — 93000 ELECTROCARDIOGRAM COMPLETE: CPT | Mod: S$GLB,,, | Performed by: INTERNAL MEDICINE

## 2018-08-28 PROCEDURE — 3008F BODY MASS INDEX DOCD: CPT | Mod: CPTII,S$GLB,, | Performed by: INTERNAL MEDICINE

## 2018-08-28 NOTE — PROGRESS NOTES
Mr. Elaine is a patient of Dr. Hanson and was last seen in clinic 8/8/2017.      Subjective:   Patient ID:  Marcos Elaine is a 62 y.o. male who presents for follow-up of No chief complaint on file.  .     HPI:    Mr. Elaine is a 62 y.o. male with AVB, PPM (2018) here for annual follow up.     Background:    History of 2:1 AVB s/p DC PM 3/6/35235 here for long term follow up.   At his last clinic appt 8/2017: 24% RV pacing.     Update (08/28/2018):    Today he feels well. No cardiac complaints. Mr. Elaine denies chest pain with exertion or at rest, palpitations, SOB, PAUL, dizziness, or syncope.  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     Device Interrogation (8/28/2018) reveals an intrinsic SR with AVB with stable lead and device function. 20 seconds of AMS noted. He paces 0% in the RA and 94% in the RV. Estimated battery longevity 8-9 years.     I have personally reviewed the patient's EKG today, which shows ASVP at 62bpm. IL interval is 172. QRS is 184. QTc is 499.    Recent Cardiac Tests:    2D Echo (3/5/2013):  CONCLUSIONS     1 - Normal left ventricular function (EF 65%).     2 - Trivial tricuspid regurgitation.     3 - Intermediate central venous pressure.     Current Outpatient Medications   Medication Sig    CALCIUM POLYCARBOPHIL (KONSYL FIBER ORAL) Take by mouth.    glucosamine-chondroitin 500-400 mg tablet Take 1 tablet by mouth 3 (three) times daily.    multivitamin capsule Take 1 capsule by mouth once daily.    simvastatin (ZOCOR) 40 MG tablet TAKE ONE TABLET BY MOUTH EVERY DAY     No current facility-administered medications for this visit.      Review of Systems   Constitution: Negative for malaise/fatigue.   Cardiovascular: Negative for chest pain, dyspnea on exertion, irregular heartbeat, leg swelling and palpitations.   Respiratory: Negative for shortness of breath.    Hematologic/Lymphatic: Negative for  "bleeding problem.   Skin: Negative for rash.   Musculoskeletal: Negative for myalgias.   Gastrointestinal: Negative for hematemesis, hematochezia and nausea.   Genitourinary: Negative for hematuria.   Neurological: Negative for light-headedness.   Psychiatric/Behavioral: Negative for altered mental status.   Allergic/Immunologic: Negative for persistent infections.     Objective:          BP (!) 148/84   Pulse 62   Ht 6' 2" (1.88 m)   Wt 79.4 kg (175 lb 0.7 oz)   BMI 22.47 kg/m²     Physical Exam   Constitutional: He is oriented to person, place, and time. He appears well-developed and well-nourished.   HENT:   Head: Normocephalic.   Nose: Nose normal.   Eyes: Pupils are equal, round, and reactive to light.   Cardiovascular: Normal rate, regular rhythm, S1 normal and S2 normal.   No murmur heard.  Pulses:       Radial pulses are 2+ on the right side, and 2+ on the left side.   Pulmonary/Chest: Breath sounds normal. No respiratory distress.   Device to LUCW   Abdominal: Normal appearance.   Musculoskeletal: Normal range of motion. He exhibits no edema.   Neurological: He is alert and oriented to person, place, and time.   Skin: Skin is warm and dry. No erythema.   Psychiatric: He has a normal mood and affect. His speech is normal and behavior is normal.   Nursing note and vitals reviewed.    Lab Results   Component Value Date     03/26/2018    K 4.4 03/26/2018    MG 2.5 03/05/2013    BUN 13 03/26/2018    CREATININE 1.0 03/26/2018    ALT 20 03/26/2018    AST 22 03/26/2018    HGB 15.8 03/26/2018    HCT 47.2 03/26/2018    TSH 1.058 03/26/2018    LDLCALC 81.6 03/26/2018           Assessment:     1. Cardiac pacemaker    2. 2:1 Second degree AV block      Plan:     In summary, Mr. Elaine is a 62 y.o. male with AVB, PPM here for annual follow up.   Mr. Elaine is doing well from a device perspective with stable lead and device function. No noise seen on device despite his receiving external shocks working with " electrical wiring.  20 seconds of AMS noted (likely AT per egram). His V pacing burden has increased significantly over the past year, from 24% to 94%.'  Will obtain echo to evaluate LV function.    Schedule echo.  Continue current medication regimen and device settings.   Follow up in device clinic as scheduled.   Follow up in EP clinic in 1 year, sooner as needed.     *Case was discussed with Dr. Hanson, who also counseled the patient.*    Follow-up in about 1 year (around 8/28/2019).    ------------------------------------------------------------------    SERENA Carbajal, NP-C  [facility]

## 2018-08-29 ENCOUNTER — HOSPITAL ENCOUNTER (OUTPATIENT)
Dept: CARDIOLOGY | Facility: CLINIC | Age: 63
Discharge: HOME | End: 2018-08-29
Attending: NURSE PRACTITIONER
Payer: COMMERCIAL

## 2018-08-29 DIAGNOSIS — Z95.0 CARDIAC PACEMAKER: ICD-10-CM

## 2018-08-29 DIAGNOSIS — I44.1 SECOND DEGREE AV BLOCK: ICD-10-CM

## 2018-08-29 LAB
AORTIC VALVE REGURGITATION: NORMAL
MITRAL VALVE REGURGITATION: NORMAL
RETIRED EF AND QEF - SEE NOTES: 50 (ref 55–65)
TRICUSPID VALVE REGURGITATION: NORMAL

## 2018-08-29 PROCEDURE — 93306 TTE W/DOPPLER COMPLETE: CPT | Mod: S$GLB,,, | Performed by: INTERNAL MEDICINE

## 2018-11-13 DIAGNOSIS — E78.5 HYPERLIPIDEMIA, UNSPECIFIED HYPERLIPIDEMIA TYPE: ICD-10-CM

## 2018-11-14 RX ORDER — SIMVASTATIN 40 MG/1
TABLET, FILM COATED ORAL
Qty: 90 TABLET | Refills: 1 | Status: SHIPPED | OUTPATIENT
Start: 2018-11-14 | End: 2019-04-08 | Stop reason: SDUPTHER

## 2018-12-03 ENCOUNTER — CLINICAL SUPPORT (OUTPATIENT)
Dept: CARDIOLOGY | Facility: HOSPITAL | Age: 63
End: 2018-12-03
Attending: INTERNAL MEDICINE
Payer: COMMERCIAL

## 2018-12-03 DIAGNOSIS — Z95.0 CARDIAC PACEMAKER IN SITU: ICD-10-CM

## 2018-12-03 PROCEDURE — 93296 REM INTERROG EVL PM/IDS: CPT

## 2018-12-03 PROCEDURE — 93294 REM INTERROG EVL PM/LDLS PM: CPT | Mod: ,,, | Performed by: INTERNAL MEDICINE

## 2018-12-16 ENCOUNTER — OFFICE VISIT (OUTPATIENT)
Dept: URGENT CARE | Facility: CLINIC | Age: 63
End: 2018-12-16
Payer: COMMERCIAL

## 2018-12-16 VITALS
HEART RATE: 92 BPM | TEMPERATURE: 98 F | WEIGHT: 175 LBS | SYSTOLIC BLOOD PRESSURE: 101 MMHG | BODY MASS INDEX: 22.47 KG/M2 | DIASTOLIC BLOOD PRESSURE: 65 MMHG | OXYGEN SATURATION: 92 %

## 2018-12-16 DIAGNOSIS — R50.9 FEVER, UNSPECIFIED FEVER CAUSE: ICD-10-CM

## 2018-12-16 DIAGNOSIS — J18.9 PNEUMONIA DUE TO INFECTIOUS ORGANISM, UNSPECIFIED LATERALITY, UNSPECIFIED PART OF LUNG: Primary | ICD-10-CM

## 2018-12-16 DIAGNOSIS — R06.02 SOB (SHORTNESS OF BREATH): ICD-10-CM

## 2018-12-16 DIAGNOSIS — E86.0 DEHYDRATION: ICD-10-CM

## 2018-12-16 LAB
CTP QC/QA: YES
FLUAV AG NPH QL: NEGATIVE
FLUBV AG NPH QL: NEGATIVE

## 2018-12-16 PROCEDURE — 1101F PT FALLS ASSESS-DOCD LE1/YR: CPT | Mod: CPTII,S$GLB,, | Performed by: NURSE PRACTITIONER

## 2018-12-16 PROCEDURE — 71046 X-RAY EXAM CHEST 2 VIEWS: CPT | Mod: S$GLB,,, | Performed by: RADIOLOGY

## 2018-12-16 PROCEDURE — 87804 INFLUENZA ASSAY W/OPTIC: CPT | Mod: 59,QW,S$GLB, | Performed by: NURSE PRACTITIONER

## 2018-12-16 PROCEDURE — 96372 THER/PROPH/DIAG INJ SC/IM: CPT | Mod: 59,S$GLB,, | Performed by: NURSE PRACTITIONER

## 2018-12-16 PROCEDURE — 94640 AIRWAY INHALATION TREATMENT: CPT | Mod: 59,S$GLB,, | Performed by: NURSE PRACTITIONER

## 2018-12-16 PROCEDURE — 99214 OFFICE O/P EST MOD 30 MIN: CPT | Mod: 25,S$GLB,, | Performed by: NURSE PRACTITIONER

## 2018-12-16 PROCEDURE — 3008F BODY MASS INDEX DOCD: CPT | Mod: CPTII,S$GLB,, | Performed by: NURSE PRACTITIONER

## 2018-12-16 RX ORDER — SODIUM CHLORIDE 9 MG/ML
INJECTION, SOLUTION INTRAVENOUS
Status: COMPLETED | OUTPATIENT
Start: 2018-12-16 | End: 2018-12-16

## 2018-12-16 RX ORDER — ALBUTEROL SULFATE 90 UG/1
2 AEROSOL, METERED RESPIRATORY (INHALATION) EVERY 6 HOURS PRN
Qty: 18 G | Refills: 0 | Status: SHIPPED | OUTPATIENT
Start: 2018-12-16 | End: 2019-04-08

## 2018-12-16 RX ORDER — PROMETHAZINE HYDROCHLORIDE AND DEXTROMETHORPHAN HYDROBROMIDE 6.25; 15 MG/5ML; MG/5ML
5 SYRUP ORAL EVERY 12 HOURS PRN
Qty: 180 ML | Refills: 0 | Status: SHIPPED | OUTPATIENT
Start: 2018-12-16 | End: 2018-12-26

## 2018-12-16 RX ORDER — IPRATROPIUM BROMIDE 0.5 MG/2.5ML
0.5 SOLUTION RESPIRATORY (INHALATION)
Status: COMPLETED | OUTPATIENT
Start: 2018-12-16 | End: 2018-12-16

## 2018-12-16 RX ORDER — ALBUTEROL SULFATE 0.83 MG/ML
2.5 SOLUTION RESPIRATORY (INHALATION)
Status: COMPLETED | OUTPATIENT
Start: 2018-12-16 | End: 2018-12-16

## 2018-12-16 RX ORDER — LEVOFLOXACIN 750 MG/1
750 TABLET ORAL DAILY
Qty: 7 TABLET | Refills: 0 | Status: SHIPPED | OUTPATIENT
Start: 2018-12-16 | End: 2018-12-23

## 2018-12-16 RX ORDER — IPRATROPIUM BROMIDE AND ALBUTEROL SULFATE 2.5; .5 MG/3ML; MG/3ML
3 SOLUTION RESPIRATORY (INHALATION)
Status: DISCONTINUED | OUTPATIENT
Start: 2018-12-16 | End: 2018-12-16

## 2018-12-16 RX ORDER — CEFTRIAXONE 1 G/1
1 INJECTION, POWDER, FOR SOLUTION INTRAMUSCULAR; INTRAVENOUS
Status: COMPLETED | OUTPATIENT
Start: 2018-12-16 | End: 2018-12-16

## 2018-12-16 RX ADMIN — SODIUM CHLORIDE: 9 INJECTION, SOLUTION INTRAVENOUS at 12:12

## 2018-12-16 RX ADMIN — ALBUTEROL SULFATE 2.5 MG: 0.83 SOLUTION RESPIRATORY (INHALATION) at 11:12

## 2018-12-16 RX ADMIN — IPRATROPIUM BROMIDE 0.5 MG: 0.5 SOLUTION RESPIRATORY (INHALATION) at 11:12

## 2018-12-16 RX ADMIN — CEFTRIAXONE 1 G: 1 INJECTION, POWDER, FOR SOLUTION INTRAMUSCULAR; INTRAVENOUS at 11:12

## 2018-12-16 NOTE — PATIENT INSTRUCTIONS
Pneumonia (Adult)  Pneumonia is an infection deep within the lungs. It is in the small air sacs (alveoli). Pneumonia may be caused by a virus or bacteria. Pneumonia caused by bacteria is usually treated with an antibiotic. Severe cases may need to be treated in the hospital. Milder cases can be treated at home. Symptoms usually start to get better during the first 2 days of treatment.    Home care  Follow these guidelines when caring for yourself at home:  · Rest at home for the first 2 to 3 days, or until you feel stronger. Dont let yourself get overly tired when you go back to your activities.  · Stay away from cigarette smoke - yours or other peoples.  · You may use acetaminophen or ibuprofen to control fever or pain, unless another medicine was prescribed. If you have chronic liver or kidney disease, talk with your healthcare provider before using these medicines. Also talk with your provider if youve had a stomach ulcer or gastrointestinal bleeding. Dont give aspirin to anyone younger than 18 years of age who is ill with a fever. It may cause severe liver damage.  · Your appetite may be poor, so a light diet is fine.  · Drink 6 to 8 glasses of fluids every day to make sure you are getting enough fluids. Beverages can include water, sport drinks, sodas without caffeine, juices, tea, or soup. Fluids will help loosen secretions in the lung. This will make it easier for you to cough up the phlegm (sputum). If you also have heart or kidney disease, check with your healthcare provider before you drink extra fluids.  · Take antibiotic medicine prescribed until it is all gone, even if you are feeling better after a few days.  Follow-up care  Follow up with your healthcare provider in the next 2 to 3 days, or as advised. This is to be sure the medicine is helping you get better.  If you are 65 or older, you should get a pneumococcal vaccine and a yearly flu (influenza) shot. You should also get these vaccines if  you have chronic lung disease like asthma, emphysema, or COPD. Recently, a second type of pneumonia vaccine has become available for everyone over 65 years old. This is in addition to the previous vaccine. Ask your provider about this.  When to seek medical advice  Call your healthcare provider right away if any of these occur:  · You dont get better within the first 48 hours of treatment  · Shortness of breath gets worse  · Rapid breathing (more than 25 breaths per minute)  · Coughing up blood  · Chest pain gets worse with breathing  · Fever of 100.4°F (38°C) or higher that doesnt get better with fever medicine  · Weakness, dizziness, or fainting that gets worse  · Thirst or dry mouth that gets worse  · Sinus pain, headache, or a stiff neck  · Chest pain not caused by coughing  Date Last Reviewed: 1/1/2017  © 6780-4257 [employer] [address] [place] [id number]. All rights reserved. This information is not intended as a substitute for professional medical care. Always follow your healthcare professional's instructions.

## 2018-12-16 NOTE — PROGRESS NOTES
Subjective:       Patient ID: Marcos Elaine is a 63 y.o. male.    Vitals:  weight is 79.4 kg (175 lb). His temperature is 97.8 °F (36.6 °C). His blood pressure is 109/65 and his pulse is 101. His oxygen saturation is 92 (abnormal)%.     Chief Complaint: URI    URI    This is a new problem. The current episode started 1 to 4 weeks ago (10 days). The problem has been gradually worsening. The maximum temperature recorded prior to his arrival was 101 - 101.9 F. The fever has been present for less than 1 day. Associated symptoms include congestion, coughing, ear pain, headaches, a plugged ear sensation and sinus pain. Pertinent negatives include no chest pain, diarrhea, dysuria, nausea, rash, sore throat or vomiting. He has tried NSAIDs for the symptoms.       Constitution: Positive for appetite change, chills, fever and generalized weakness. Negative for fatigue.   HENT: Positive for ear pain, congestion, sinus pain, sinus pressure and voice change. Negative for sore throat.    Neck: Negative for painful lymph nodes.   Cardiovascular: Negative for chest pain and leg swelling.   Eyes: Negative for double vision and blurred vision.   Respiratory: Positive for chest tightness, cough, sputum production and shortness of breath.    Gastrointestinal: Negative for nausea, vomiting and diarrhea.   Genitourinary: Negative for dysuria, frequency and urgency.   Musculoskeletal: Positive for muscle ache. Negative for joint pain, joint swelling and muscle cramps.   Skin: Negative for color change, pale and rash.   Allergic/Immunologic: Negative for seasonal allergies.   Neurological: Positive for headaches. Negative for dizziness, history of vertigo, light-headedness and passing out.   Hematologic/Lymphatic: Negative for swollen lymph nodes, easy bruising/bleeding and history of blood clots. Does not bruise/bleed easily.   Psychiatric/Behavioral: Negative for nervous/anxious, sleep disturbance and depression. The patient is not  nervous/anxious.        Objective:      Physical Exam   Constitutional: He is oriented to person, place, and time. Vital signs are normal. He appears well-developed and well-nourished.   HENT:   Head: Normocephalic and atraumatic.   Right Ear: Tympanic membrane, external ear and ear canal normal.   Left Ear: Tympanic membrane, external ear and ear canal normal.   Nose: Mucosal edema and rhinorrhea present. Right sinus exhibits frontal sinus tenderness. Right sinus exhibits no maxillary sinus tenderness. Left sinus exhibits frontal sinus tenderness. Left sinus exhibits no maxillary sinus tenderness.   Mouth/Throat: Oropharynx is clear and moist and mucous membranes are normal. No oropharyngeal exudate or posterior oropharyngeal erythema.   Cardiovascular: Regular rhythm and normal heart sounds. Tachycardia present.   Pulmonary/Chest: Effort normal and breath sounds normal. No respiratory distress. He has no wheezes. He has no rales.   Lymphadenopathy:     He has cervical adenopathy.   Neurological: He is alert and oriented to person, place, and time.   Skin: Skin is warm, dry and intact. No rash noted.   Psychiatric: He has a normal mood and affect.   Vitals reviewed.      Recent Results (from the past 24 hour(s))   POCT Influenza A/B    Collection Time: 12/16/18 10:17 AM   Result Value Ref Range    Rapid Influenza A Ag Negative Negative    Rapid Influenza B Ag Negative Negative     Acceptable Yes      Assessment:       1. Pneumonia due to infectious organism, unspecified laterality, unspecified part of lung    2. Fever, unspecified fever cause    3. SOB (shortness of breath)        Plan:         Pneumonia due to infectious organism, unspecified laterality, unspecified part of lung  -     levoFLOXacin (LEVAQUIN) 750 MG tablet; Take 1 tablet (750 mg total) by mouth once daily. for 7 days  Dispense: 7 tablet; Refill: 0  -     promethazine-dextromethorphan (PROMETHAZINE-DM) 6.25-15 mg/5 mL Syrp; Take 5  mLs by mouth every 12 (twelve) hours as needed.  Dispense: 180 mL; Refill: 0  -     Discontinue: albuterol-ipratropium 2.5 mg-0.5 mg/3 mL nebulizer solution 3 mL  spent 45 minutes with the patient of which more than half was spent in direct consultation with the patient in regards to our treatment and plan.   Education. In office Nebs. States feels better. Wheezes cleared and better breath sounds noted in lower fields after nebs; Gait good. A&Ox3. HR now 96. O2 Sats at 92%. Blood pressure dropped 89/60 will start bolus. After blous patient's blood pressure 101/65    Fever, unspecified fever cause  -     POCT Influenza A/B  -     X-Ray Chest PA And Lateral; Future; Expected date: 12/16/2018    SOB (shortness of breath)  -     X-Ray Chest PA And Lateral; Future; Expected date: 12/16/2018  -     albuterol (PROVENTIL/VENTOLIN HFA) 90 mcg/actuation inhaler; Inhale 2 puffs into the lungs every 6 (six) hours as needed for Wheezing. Rescue  Dispense: 18 g; Refill: 0  -     Discontinue: albuterol-ipratropium 2.5 mg-0.5 mg/3 mL nebulizer solution 3 mL  -     albuterol nebulizer solution 2.5 mg  -     ipratropium 0.02 % nebulizer solution 0.5 mg    Home care  Follow these guidelines when caring for yourself at home:  · Rest at home for the first 2 to 3 days, or until you feel stronger. Dont let yourself get overly tired when you go back to your activities.  · Stay away from cigarette smoke - yours or other peoples.  · You may use acetaminophen or ibuprofen to control fever or pain, unless another medicine was prescribed. If you have chronic liver or kidney disease, talk with your healthcare provider before using these medicines. Also talk with your provider if youve had a stomach ulcer or gastrointestinal bleeding. Dont give aspirin to anyone younger than 18 years of age who is ill with a fever. It may cause severe liver damage.  · Your appetite may be poor, so a light diet is fine.  · Drink 6 to 8 glasses of fluids every  day to make sure you are getting enough fluids. Beverages can include water, sport drinks, sodas without caffeine, juices, tea, or soup. Fluids will help loosen secretions in the lung. This will make it easier for you to cough up the phlegm (sputum). If you also have heart or kidney disease, check with your healthcare provider before you drink extra fluids.  · Take antibiotic medicine prescribed until it is all gone, even if you are feeling better after a few days.  Follow-up care  Follow up with your healthcare provider in the next 2 to 3 days, or as advised. This is to be sure the medicine is helping you get better.  If you are 65 or older, you should get a pneumococcal vaccine and a yearly flu (influenza) shot. You should also get these vaccines if you have chronic lung disease like asthma, emphysema, or COPD. Recently, a second type of pneumonia vaccine has become available for everyone over 65 years old. This is in addition to the previous vaccine. Ask your provider about this.  When to seek medical advice  Call your healthcare provider right away if any of these occur:  · You dont get better within the first 48 hours of treatment  · Shortness of breath gets worse  · Rapid breathing (more than 25 breaths per minute)  · Coughing up blood  · Chest pain gets worse with breathing  · Fever of 100.4°F (38°C) or higher that doesnt get better with fever medicine  · Weakness, dizziness, or fainting that gets worse  · Thirst or dry mouth that gets worse  · Sinus pain, headache, or a stiff neck  · Chest pain not caused by coughing  Date Last Reviewed: 1/1/2017  © 2078-3681 [employer]. [address] [place] [address], [place] [address]. All rights reserved. This information is not intended as a substitute for professional medical care. Always follow your healthcare professional's instructions.

## 2018-12-17 ENCOUNTER — OFFICE VISIT (OUTPATIENT)
Dept: FAMILY MEDICINE | Facility: CLINIC | Age: 63
End: 2018-12-17
Payer: COMMERCIAL

## 2018-12-17 VITALS
TEMPERATURE: 98 F | DIASTOLIC BLOOD PRESSURE: 70 MMHG | HEART RATE: 82 BPM | HEIGHT: 74 IN | BODY MASS INDEX: 22.27 KG/M2 | SYSTOLIC BLOOD PRESSURE: 108 MMHG | WEIGHT: 173.5 LBS | OXYGEN SATURATION: 92 %

## 2018-12-17 DIAGNOSIS — R06.02 SOB (SHORTNESS OF BREATH): ICD-10-CM

## 2018-12-17 DIAGNOSIS — J18.9 PNEUMONIA DUE TO INFECTIOUS ORGANISM, UNSPECIFIED LATERALITY, UNSPECIFIED PART OF LUNG: Primary | ICD-10-CM

## 2018-12-17 PROCEDURE — 99214 OFFICE O/P EST MOD 30 MIN: CPT | Mod: S$GLB,,, | Performed by: NURSE PRACTITIONER

## 2018-12-17 PROCEDURE — 3008F BODY MASS INDEX DOCD: CPT | Mod: CPTII,S$GLB,, | Performed by: NURSE PRACTITIONER

## 2018-12-17 PROCEDURE — 1101F PT FALLS ASSESS-DOCD LE1/YR: CPT | Mod: CPTII,S$GLB,, | Performed by: NURSE PRACTITIONER

## 2018-12-17 PROCEDURE — 99999 PR PBB SHADOW E&M-EST. PATIENT-LVL IV: CPT | Mod: PBBFAC,,, | Performed by: NURSE PRACTITIONER

## 2018-12-17 RX ORDER — METHYLPREDNISOLONE 4 MG/1
TABLET ORAL
Qty: 1 PACKAGE | Refills: 0 | Status: SHIPPED | OUTPATIENT
Start: 2018-12-17 | End: 2019-04-08

## 2018-12-17 NOTE — PROGRESS NOTES
Subjective:       Patient ID: Marcos Elaine is a 63 y.o. male.    Chief Complaint: Hospital Follow Up and Shortness of Breath    Patient is a  63 year old  Male nknown to me who presented to clinic for follow-up from urgent visit yesterday. Patient was diagnosed with pneumonia and prescribed Levaquin.         URI    This is a new problem. The current episode started 1 to 4 weeks ago (10 days). The problem has been gradually worsening. The maximum temperature recorded prior to his arrival was 101 - 101.9 F. The fever has been present for less than 1 day. Associated symptoms include congestion, coughing, ear pain, headaches, a plugged ear sensation and sinus pain. Pertinent negatives include no chest pain, diarrhea, dysuria, joint pain, joint swelling, nausea, neck pain, rash, sneezing, sore throat, swollen glands or vomiting. He has tried NSAIDs and acetaminophen (antiboitics) for the symptoms.     Review of Systems   HENT: Positive for congestion, ear pain and sinus pain. Negative for sneezing and sore throat.    Respiratory: Positive for cough and shortness of breath. Negative for chest tightness.    Cardiovascular: Negative for chest pain.   Gastrointestinal: Negative for diarrhea, nausea and vomiting.   Genitourinary: Negative for dysuria.   Musculoskeletal: Negative for joint pain and neck pain.   Skin: Negative for rash.   Neurological: Positive for headaches.       Objective:      Physical Exam   Constitutional: He is oriented to person, place, and time. Vital signs are normal. He appears well-developed and well-nourished.   HENT:   Head: Normocephalic and atraumatic.   Right Ear: Tympanic membrane, external ear and ear canal normal.   Left Ear: Tympanic membrane, external ear and ear canal normal.   Nose: Mucosal edema and rhinorrhea present. Right sinus exhibits frontal sinus tenderness. Right sinus exhibits no maxillary sinus tenderness. Left sinus exhibits frontal sinus tenderness. Left sinus exhibits  no maxillary sinus tenderness.   Mouth/Throat: Oropharynx is clear and moist and mucous membranes are normal. No oropharyngeal exudate or posterior oropharyngeal erythema.   Cardiovascular: Normal rate, regular rhythm and normal heart sounds.   Pulmonary/Chest: Effort normal. No respiratory distress. He has decreased breath sounds. He has no wheezes. He has no rales.   Lymphadenopathy:     He has no cervical adenopathy.   Neurological: He is alert and oriented to person, place, and time.   Skin: Skin is warm, dry and intact. No rash noted.   Psychiatric: He has a normal mood and affect.   Vitals reviewed.      Assessment:       1. Pneumonia due to infectious organism, unspecified laterality, unspecified part of lung    2. SOB (shortness of breath)        Plan:       Marcos was seen today for hospital follow up and shortness of breath.    Diagnoses and all orders for this visit:    Pneumonia due to infectious organism, unspecified laterality, unspecified part of lung  -     methylPREDNISolone (MEDROL DOSEPACK) 4 mg tablet; use as directed    SOB (shortness of breath)  -     methylPREDNISolone (MEDROL DOSEPACK) 4 mg tablet; use as directed  Please return here or go to the Emergency Department for any concerns or worsening of condition.  If you were prescribed antibiotics, please take them to completion.  If you were prescribed a narcotic medication, do not drive or operate heavy equipment or machinery while taking these medications.  Please follow up with your primary care doctor or specialist as needed.    If you  smoke, please stop smoking.

## 2018-12-17 NOTE — PATIENT INSTRUCTIONS
Treating Pneumonia  Pneumonia is an infection of one or both of the lungs. Pneumonia:  · Is usually caused by either a virus or a bacteria  · Can be very serious, especially in infants, young children, and older adults. Its also serious for those with other long-term health problems or weakened immune systems.  · Is sometimes treated at home and sometimes in the hospital    Antibiotic medicines  Antibiotics may be prescribed for pneumonia caused by bacteria. They may be pills (oral medicines), or shots (injections). Or they may be given by IV (intravenously) into a vein. If you are taking oral medicines at home:  · Fill your prescription and start taking your medicine as soon as you can.  · You will likely start to feel better in a day or 2, but dont stop taking the antibiotic.  · Use a pill organizer to help you remember to take your medicine.  · Let your healthcare provider know if you have side effects.  · Take your medicine exactly as directed on the label. Talk to your provider or pharmacist if you have any questions.  Antiviral medicines  Antiviral medicine may be prescribed for pneumonia caused by a virus. For example, antiviral medicine may be prescribed for pneumonia caused by the flu virus. Antibiotics do not work against viruses. If you are taking antiviral medicine at home:  · Fill your prescription and start taking your medicine as soon as you can.  · Talk with your provider or pharmacist about possible side effects.  · Take the medicine exactly as instructed.  To relieve symptoms  There are many medicines that can help relieve symptoms of pneumonia. Some are prescription and some are over-the-counter.  Your healthcare provider may recommend:  · Acetaminophen or ibuprofen to lower your fever and to lessen headache or other pain  · Cough medicine to loosen mucus or to reduce coughing  Make sure you check with your healthcare provider or pharmacist before taking any over-the-counter  medicines.  Special treatments  If you are hospitalized for pneumonia, you may have other therapies, including:  · Inhaled medicines to help with breathing or chest congestion  · Supplemental oxygen to increase low oxygen levels  Drink fluids and eat healthy  You should eat healthy to help your body fight the infection. Drinking a lot of fluids helps to replace fluids lost from fever and to loosen mucus in your chest.  · Diet. Make healthy food choices, including fruits and vegetables, lean meats and other proteins, 100% whole grain and low- or no-fat dairy products.  · Fluids. Drink at least 6 to 8 tall glasses a day. Water and 100% fruit or vegetable juice are best.  Get plenty of rest and sleep  You may be more tired than usual for a while. It is important to get enough sleep at night. Its also important to rest during the day. Talk with your healthcare provider if coughing or other symptoms are interfering with your sleep.  Preventing the spread of germs  The best thing you can do to prevent spreading germs is to wash your hands often. You should:  · Rub your hand with soap and water for 20 to 30 seconds.  · Clean in between your fingers, the backs of your hands, and around your nails.  · Dry your hands on a separate towel or use paper towels.  You should also:  · Keep alcohol-based hand  nearby.  · Make sure you also clean surfaces that you touch. Use a product that kills all types of germs.  · Stay away from others until you are feeling better.  When to call your healthcare provider  Call your healthcare provider if you have any of the following:  · Symptoms get worse  · Fever continues  · Shortness of breath gets worse  · Increased mucus or mucus that is darker in color  · Coughing gets worse  · Lips or fingers are bluish in color  · Side effects from your medicine   Date Last Reviewed: 12/1/2016  © 3041-4648 [employer]. [address] [place] [address], [place] [address]. All rights reserved.  This information is not intended as a substitute for professional medical care. Always follow your healthcare professional's instructions.

## 2018-12-18 LAB
ALBUMIN SERPL-MCNC: 4.2 G/DL (ref 3.6–4.8)
ALBUMIN/GLOB SERPL: 1.6 {RATIO} (ref 1.2–2.2)
ALP SERPL-CCNC: 85 IU/L (ref 39–117)
ALT SERPL-CCNC: 21 IU/L (ref 0–44)
AST SERPL-CCNC: 21 IU/L (ref 0–40)
BASOPHILS # BLD AUTO: 0 X10E3/UL (ref 0–0.2)
BASOPHILS NFR BLD AUTO: 0 %
BILIRUB SERPL-MCNC: 1.1 MG/DL (ref 0–1.2)
BUN SERPL-MCNC: 18 MG/DL (ref 8–27)
BUN/CREAT SERPL: 14 (ref 10–24)
CALCIUM SERPL-MCNC: 9.1 MG/DL (ref 8.6–10.2)
CHLORIDE SERPL-SCNC: 96 MMOL/L (ref 96–106)
CO2 SERPL-SCNC: 19 MMOL/L (ref 20–29)
CREAT SERPL-MCNC: 1.28 MG/DL (ref 0.76–1.27)
EGFR IF AFRICAN AMERICAN: 67 ML/MIN/1.73
EOSINOPHIL # BLD AUTO: 0 X10E3/UL (ref 0–0.4)
EOSINOPHIL NFR BLD AUTO: 0 %
ERYTHROCYTE [DISTWIDTH] IN BLOOD BY AUTOMATED COUNT: 13.9 % (ref 12.3–15.4)
EST. GFR  (NON AFRICAN AMERICAN): 58 ML/MIN/1.73
GLOBULIN SER CALC-MCNC: 2.6 G/DL (ref 1.5–4.5)
GLUCOSE SERPL-MCNC: 129 MG/DL (ref 65–99)
HCT VFR BLD AUTO: 45.5 % (ref 37.5–51)
HGB BLD-MCNC: 15 G/DL (ref 13–17.7)
IMM GRANULOCYTES # BLD: 0 X10E3/UL (ref 0–0.1)
IMM GRANULOCYTES NFR BLD: 0 %
LYMPHOCYTES # BLD AUTO: 1 X10E3/UL (ref 0.7–3.1)
LYMPHOCYTES NFR BLD AUTO: 9 %
MCH RBC QN AUTO: 30.8 PG (ref 26.6–33)
MCHC RBC AUTO-ENTMCNC: 33 G/DL (ref 31.5–35.7)
MCV RBC AUTO: 93 FL (ref 79–97)
MONOCYTES # BLD AUTO: 0.4 X10E3/UL (ref 0.1–0.9)
MONOCYTES NFR BLD AUTO: 3 %
NEUTROPHILS # BLD AUTO: 10.2 X10E3/UL (ref 1.4–7)
NEUTROPHILS NFR BLD AUTO: 88 %
PLATELET # BLD AUTO: 242 X10E3/UL (ref 150–379)
POTASSIUM SERPL-SCNC: 4.3 MMOL/L (ref 3.5–5.2)
PROT SERPL-MCNC: 6.8 G/DL (ref 6–8.5)
RBC # BLD AUTO: 4.87 X10E6/UL (ref 4.14–5.8)
SODIUM SERPL-SCNC: 136 MMOL/L (ref 134–144)
WBC # BLD AUTO: 11.6 X10E3/UL (ref 3.4–10.8)

## 2018-12-26 ENCOUNTER — OFFICE VISIT (OUTPATIENT)
Dept: FAMILY MEDICINE | Facility: CLINIC | Age: 63
End: 2018-12-26
Payer: COMMERCIAL

## 2018-12-26 VITALS
BODY MASS INDEX: 22.43 KG/M2 | TEMPERATURE: 98 F | DIASTOLIC BLOOD PRESSURE: 70 MMHG | HEART RATE: 69 BPM | OXYGEN SATURATION: 97 % | SYSTOLIC BLOOD PRESSURE: 122 MMHG | HEIGHT: 74 IN | WEIGHT: 174.81 LBS

## 2018-12-26 DIAGNOSIS — H60.392 OTHER INFECTIVE ACUTE OTITIS EXTERNA OF LEFT EAR: Primary | ICD-10-CM

## 2018-12-26 PROCEDURE — 99214 OFFICE O/P EST MOD 30 MIN: CPT | Mod: S$GLB,,, | Performed by: INTERNAL MEDICINE

## 2018-12-26 PROCEDURE — 1101F PT FALLS ASSESS-DOCD LE1/YR: CPT | Mod: CPTII,S$GLB,, | Performed by: INTERNAL MEDICINE

## 2018-12-26 PROCEDURE — 3008F BODY MASS INDEX DOCD: CPT | Mod: CPTII,S$GLB,, | Performed by: INTERNAL MEDICINE

## 2018-12-26 PROCEDURE — 99999 PR PBB SHADOW E&M-EST. PATIENT-LVL IV: CPT | Mod: PBBFAC,,, | Performed by: INTERNAL MEDICINE

## 2018-12-26 RX ORDER — CIPROFLOXACIN AND DEXAMETHASONE 3; 1 MG/ML; MG/ML
4 SUSPENSION/ DROPS AURICULAR (OTIC) 2 TIMES DAILY
Qty: 7.5 ML | Refills: 0 | Status: SHIPPED | OUTPATIENT
Start: 2018-12-26 | End: 2018-12-31

## 2018-12-26 NOTE — PROGRESS NOTES
Subjective:       Chief Complaint  Chief Complaint   Patient presents with    Otalgia       HPI  Marcos Elaine is a 63 y.o. male with multiple medical diagnoses as listed in the medical history and problem list that presents for left ear pain.     L otalgia started prior to diagnosis of recent pneumonia  Had recent sore throat accompanied by fevers as well as difficulty breathing  No PAUL    Patient Care Team:  Hiren Gonzalez MD as PCP - General (Internal Medicine)      PAST MEDICAL HISTORY:  Past Medical History:   Diagnosis Date    2:1 Second degree AV block 3/5/2013    Cardiac pacemaker 3/6/2013    Heart block 3/6/13    S/p DC PM    Hyperlipidemia     Personal history of colonic polyps 2013    Normal  ---Repeat colonoscopy in 10 years for screening purposes. (Previous polyps were both hyperplastic)       PAST SURGICAL HISTORY:  Past Surgical History:   Procedure Laterality Date    COLONOSCOPY N/A 2013    [facility]formed [facility]    inguinal hernia repair      x 2    INSERT / REPLACE / REMOVE PACEMAKER  3/6/13    S/p DC PM       SOCIAL HISTORY:  Social History     Socioeconomic History    Marital status:      Spouse name: Not on file    Number of children: Not on file    Years of education: Not on file    Highest education level: Not on file   Social Needs    Financial resource strain: Not on file    Food insecurity - worry: Not on file    Food insecurity - inability: Not on file    Transportation needs - medical: Not on file    Transportation needs - non-medical: Not on file   Occupational History    Not on file   Tobacco Use    Smoking status: Former Smoker     Types: Cigars     Last attempt to quit: 1973     Years since quittin.4    Smokeless tobacco: Never Used   Substance and Sexual Activity    Alcohol use: Yes     Alcohol/week: 6.0 oz     Types: 5 Glasses of wine, 5 Cans of beer per week     Comment: Daily     "Drug use: No    Sexual activity: Not on file   Other Topics Concern    Not on file   Social History Narrative    Not on file       FAMILY HISTORY:  Family History   Problem Relation Age of Onset    Diabetes Mother     Cancer Mother     Cancer Father        ALLERGIES AND MEDICATIONS: updated and reviewed.  Review of patient's allergies indicates:  No Known Allergies  Current Outpatient Medications   Medication Sig Dispense Refill    simvastatin (ZOCOR) 40 MG tablet TAKE ONE TABLET BY MOUTH EVERY DAY 90 tablet 1    AFLURIA QUAD [id number] 60 mcg/0.5 mL vaccine       albuterol (PROVENTIL/VENTOLIN HFA) 90 mcg/actuation inhaler Inhale 2 puffs into the lungs every 6 (six) hours as needed for Wheezing. Rescue 18 g 0    CALCIUM POLYCARBOPHIL (KONSYL FIBER ORAL) Take by mouth.      ciprofloxacin-dexamethasone 0.3-0.1% (CIPRODEX) 0.3-0.1 % DrpS Place 4 drops into the left ear 2 (two) times daily. for 5 days 7.5 mL 0    glucosamine-chondroitin 500-400 mg tablet Take 1 tablet by mouth 3 (three) times daily.      methylPREDNISolone (MEDROL DOSEPACK) 4 mg tablet use as directed 1 Package 0    multivitamin capsule Take 1 capsule by mouth once daily.       No current facility-administered medications for this visit.          ROS  Review of Systems   Constitutional: Positive for diaphoresis (night sweats) and fatigue (malaise). Negative for chills and fever.   HENT: Positive for ear pain (left).    Neurological: Positive for headaches.         Objective:       Physical Exam  Vitals:    12/26/18 1602   BP: 122/70   Pulse: 69   Temp: 98.4 °F (36.9 °C)   TempSrc: Oral   SpO2: 97%   Weight: 79.3 kg (174 lb 13.2 oz)   Height: 6' 2" (1.88 m)    Body mass index is 22.45 kg/m².  Weight: 79.3 kg (174 lb 13.2 oz)   Height: 6' 2" (188 cm)   Physical Exam   Constitutional: He appears well-developed and well-nourished.   HENT:   Head: Normocephalic and atraumatic.   Right Ear: Tympanic membrane is not erythematous.   Left Ear: " Tympanic membrane is not erythematous.   Nose: Mucosal edema and rhinorrhea present. No nasal deformity. No epistaxis. Right sinus exhibits no maxillary sinus tenderness and no frontal sinus tenderness. Left sinus exhibits no maxillary sinus tenderness and no frontal sinus tenderness.   L ear canal erythematous/scaly   Eyes: Conjunctivae and EOM are normal.   Neck: Normal range of motion. Neck supple.   Cardiovascular: Normal rate.   Pulmonary/Chest: Effort normal and breath sounds normal. No stridor. He has no wheezes. He has no rales.   Musculoskeletal: He exhibits no edema.   Lymphadenopathy:     He has no cervical adenopathy.   Neurological: He is alert. No cranial nerve deficit.   Skin: Skin is warm and dry. No rash noted.   Vitals reviewed.          Assessment:     1. Other infective acute otitis externa of left ear      Plan:     Marcos was seen today for otalgia.    Diagnoses and all orders for this visit:    Other infective acute otitis externa of left ear  Given signs/symptoms of external ear canal inflammation trial otic drops as noted   -     ciprofloxacin-dexamethasone 0.3-0.1% (CIPRODEX) 0.3-0.1 % DrpS; Place 4 drops into the left ear 2 (two) times daily. for 5 days          Health Maintenance       Date Due Completion Date    Hepatitis C Screening 1955 ---    TETANUS VACCINE 12/11/1971 ---    Zoster Vaccine 12/11/2013 ---    Influenza Vaccine 08/01/2018 9/29/2017    Pneumococcal Vaccine (65+ Low/Medium Risk) (1 of 2 - PCV13) 12/11/2018 ---    Abdominal Aortic Aneurysm Screening 12/11/2018 ---    Lipid Panel 03/26/2023 3/26/2018    Colonoscopy 12/30/2023 12/30/2013            Health Maintenance reviewed, addressed as per orders    No Follow-up on file.    The patient expressed understanding and no barriers to adherence were identified.     1. The patient indicates understanding of these issues and agrees with the plan. Brief care plan is updated and reviewed with the patient as applicable.     2.  The patient is given an After Visit Summary that lists all medications with directions, allergies, orders placed during this encounter and follow-up instructions.     3. I have reviewed the patient's medical information including past medical, family, and social history sections including the medications and allergies.     4. We discussed the patient's current medications. I reconciled the patient's medication list and prepared and supplied needed refills.       Jesse Rodríguez MD  Internal Medicine-Pediatrics

## 2019-01-09 ENCOUNTER — OFFICE VISIT (OUTPATIENT)
Dept: FAMILY MEDICINE | Facility: CLINIC | Age: 64
End: 2019-01-09
Payer: COMMERCIAL

## 2019-01-09 VITALS
BODY MASS INDEX: 22.55 KG/M2 | DIASTOLIC BLOOD PRESSURE: 78 MMHG | TEMPERATURE: 98 F | OXYGEN SATURATION: 97 % | HEIGHT: 74 IN | HEART RATE: 65 BPM | SYSTOLIC BLOOD PRESSURE: 130 MMHG | WEIGHT: 175.69 LBS

## 2019-01-09 DIAGNOSIS — J30.2 SEASONAL ALLERGIC RHINITIS, UNSPECIFIED TRIGGER: ICD-10-CM

## 2019-01-09 DIAGNOSIS — H60.92 OTITIS EXTERNA OF LEFT EAR, UNSPECIFIED CHRONICITY, UNSPECIFIED TYPE: ICD-10-CM

## 2019-01-09 DIAGNOSIS — J18.9 PNEUMONIA DUE TO INFECTIOUS ORGANISM, UNSPECIFIED LATERALITY, UNSPECIFIED PART OF LUNG: Primary | ICD-10-CM

## 2019-01-09 DIAGNOSIS — R93.89 ABNORMAL CXR: ICD-10-CM

## 2019-01-09 PROCEDURE — 99999 PR PBB SHADOW E&M-EST. PATIENT-LVL III: ICD-10-PCS | Mod: PBBFAC,,, | Performed by: INTERNAL MEDICINE

## 2019-01-09 PROCEDURE — 99999 PR PBB SHADOW E&M-EST. PATIENT-LVL III: CPT | Mod: PBBFAC,,, | Performed by: INTERNAL MEDICINE

## 2019-01-09 PROCEDURE — 99214 OFFICE O/P EST MOD 30 MIN: CPT | Mod: S$GLB,,, | Performed by: INTERNAL MEDICINE

## 2019-01-09 PROCEDURE — 99214 PR OFFICE/OUTPT VISIT, EST, LEVL IV, 30-39 MIN: ICD-10-PCS | Mod: S$GLB,,, | Performed by: INTERNAL MEDICINE

## 2019-01-09 NOTE — PROGRESS NOTES
Chief complaint:  Follow-up on pneumonia    63-year-old white male.  Sounds like he has history of underlying allergies which persist with a tickle in the throat postnasal drip and a cough.  Does not like to take medications but he does confirm that use of medicines as Claritin did help in the past.  Sounds like he developed some URI symptoms in December.  After 9 days he then developed fever.  He had a chest x-ray showing a posterior possible infiltrate.  The chest x-ray was December 16th.  Symptoms all resolved and he was treated.  He subsequently had some left otitis externa and took drops.  Occasionally left ear clicks.  He does wear hearing aids.  On exam today the ear canals are clear and tympanic membranes are pearly.  May still have some residual slight swelling in that left ear canal with clicking related to his hearing aid.    Reviewed that he will be due for his physical in April.  He received his flu shot.  Now the 63 we discussed pneumonia vaccines which will defer to his physical.  He does have a young grandchild when he is not interested in getting the tetanus and whooping cough booster.  His wife got at a pharmacy.  If indeed family mandates that he get he can get a pharmacy as well.      cxr 12/16 w issue on lateral    ROS:   CONST: weight stable. EYES: no vision change. ENT: no sore throat. CV: no chest pain w/ exertion. RESP: no shortness of breath. GI: no nausea, vomiting, diarrhea. No dysphagia. : no urinary issues. MUSCULOSKELETAL: no new myalgias or arthralgias. SKIN: no new changes. NEURO: no focal deficits. PSYCH: no new issues. ENDOCRINE: no polyuria. HEME: no lymph nodes. ALLERGY: no general pruritis.          Past Medical History:   Diagnosis Date    2:1 Second degree AV block 3/5/2013    Cardiac pacemaker 3/6/2013    Heart block 3/6/13    S/p DC PM    Hyperlipidemia     Personal history of colonic polyps 12/30/2013    Normal 12/13 ---Repeat colonoscopy in 10 years for screening  purposes. (Previous polyps were both hyperplastic)   Diverticulitis  Occasional GERD          Past Surgical History:   Procedure Laterality Date    COLONOSCOPY N/A 12/30/2013    Performed by [facility] at [facility] ([facility])    inguinal hernia repair      x 2    INSERT / REPLACE / REMOVE PACEMAKER  3/6/13    S/p DC PM       Social History     Social History    Marital status:      Spouse name: N/A    Number of children: 3 boys in college    Years of education: N/A     Occupational History    realator     Social History Main Topics    Smoking status: Former Smoker     Types: Cigars     Quit date: 7/23/1973    Smokeless tobacco: Never Used    Alcohol use 6.0 oz/week     5 Glasses of wine, 5 Cans of beer per week      Comment: Daily    Drug use: No    Sexual activity: Not on file     Other Topics Concern    Not on file     Social History Narrative       family history includes Cancer in his father and mother; Diabetes in his mother.      Gen: no distress  EYES: conjunctiva clear, non-icteric, PERRL  ENT: nose clear, nasal mucosa normal, oropharynx clear and moist, teeth good, ear canals are clear  NECK:supple, thyroid non-palpable  RESP: effort is good, lungs clear  CV: heart RRR w/o murmur, gallops or rubs; no carotid bruits, no edema  GI: abdomen soft, non-distended, non-tender,  MS: gait normal, no clubbing or cyanosis of the digits  SKIN: no rashes, warm to touch    Marcos was seen today for pneumonia.    Diagnoses and all orders for this visit:    Pneumonia due to infectious organism, unspecified laterality, unspecified part of lung, story is consistent with a possible pneumonia.  Will do a 6 week follow-up chest x-ray.  Clinically he has responded and resolved.    Abnormal CXR  -     X-Ray Chest PA And Lateral; Future    Seasonal allergic rhinitis, unspecified trigger, ongoing symptoms but not too bothersome and recommended offered him to take Claritin and he will  "consider    Otitis externa of left ear, unspecified chronicity, unspecified type, appears to be resolved although at increased risk given the hearing aids.     Clinical no be sensitive based on his expressed anxiety referable to the above issues."This note will not be shared with the patient."  "

## 2019-02-07 ENCOUNTER — HOSPITAL ENCOUNTER (OUTPATIENT)
Dept: RADIOLOGY | Facility: HOSPITAL | Age: 64
Discharge: HOME | End: 2019-02-07
Attending: INTERNAL MEDICINE
Payer: COMMERCIAL

## 2019-02-07 DIAGNOSIS — R93.89 ABNORMAL CXR: ICD-10-CM

## 2019-02-07 PROCEDURE — 71046 X-RAY EXAM CHEST 2 VIEWS: CPT | Mod: TC,FY,PO

## 2019-02-07 PROCEDURE — 71046 X-RAY EXAM CHEST 2 VIEWS: CPT | Mod: 26,,, | Performed by: RADIOLOGY

## 2019-02-07 PROCEDURE — 71046 XR CHEST PA AND LATERAL: ICD-10-PCS | Mod: 26,,, | Performed by: RADIOLOGY

## 2019-02-28 ENCOUNTER — PATIENT MESSAGE (OUTPATIENT)
Dept: FAMILY MEDICINE | Facility: CLINIC | Age: 64
End: 2019-02-28

## 2019-03-19 ENCOUNTER — CLINICAL SUPPORT (OUTPATIENT)
Dept: CARDIOLOGY | Facility: HOSPITAL | Age: 64
End: 2019-03-19
Attending: INTERNAL MEDICINE
Payer: COMMERCIAL

## 2019-03-19 DIAGNOSIS — Z95.0 CARDIAC PACEMAKER IN SITU: ICD-10-CM

## 2019-03-19 DIAGNOSIS — Z95.0 CARDIAC PACEMAKER IN SITU: Primary | ICD-10-CM

## 2019-03-19 PROCEDURE — 93296 REM INTERROG EVL PM/IDS: CPT

## 2019-03-25 ENCOUNTER — PATIENT MESSAGE (OUTPATIENT)
Dept: FAMILY MEDICINE | Facility: CLINIC | Age: 64
End: 2019-03-25

## 2019-03-25 DIAGNOSIS — Z00.00 ROUTINE MEDICAL EXAM: Primary | ICD-10-CM

## 2019-03-28 ENCOUNTER — PATIENT MESSAGE (OUTPATIENT)
Dept: FAMILY MEDICINE | Facility: CLINIC | Age: 64
End: 2019-03-28

## 2019-04-01 ENCOUNTER — LAB VISIT (OUTPATIENT)
Dept: LAB | Facility: HOSPITAL | Age: 64
End: 2019-04-01
Attending: INTERNAL MEDICINE
Payer: COMMERCIAL

## 2019-04-01 DIAGNOSIS — Z00.00 ROUTINE MEDICAL EXAM: ICD-10-CM

## 2019-04-01 DIAGNOSIS — Z11.59 NEED FOR HEPATITIS C SCREENING TEST: ICD-10-CM

## 2019-04-01 LAB
ALBUMIN SERPL BCP-MCNC: 3.8 G/DL (ref 3.5–5.2)
ALP SERPL-CCNC: 82 U/L (ref 55–135)
ALT SERPL W/O P-5'-P-CCNC: 23 U/L (ref 10–44)
ANION GAP SERPL CALC-SCNC: 7 MMOL/L (ref 8–16)
AST SERPL-CCNC: 23 U/L (ref 10–40)
BASOPHILS # BLD AUTO: 0.04 K/UL (ref 0–0.2)
BASOPHILS NFR BLD: 0.8 % (ref 0–1.9)
BILIRUB SERPL-MCNC: 0.6 MG/DL (ref 0.1–1)
BUN SERPL-MCNC: 10 MG/DL (ref 8–23)
CALCIUM SERPL-MCNC: 9.4 MG/DL (ref 8.7–10.5)
CHLORIDE SERPL-SCNC: 105 MMOL/L (ref 95–110)
CHOLEST SERPL-MCNC: 197 MG/DL (ref 120–199)
CHOLEST/HDLC SERPL: 2.8 {RATIO} (ref 2–5)
CO2 SERPL-SCNC: 28 MMOL/L (ref 23–29)
COMPLEXED PSA SERPL-MCNC: 2.2 NG/ML (ref 0–4)
CREAT SERPL-MCNC: 0.9 MG/DL (ref 0.5–1.4)
DIFFERENTIAL METHOD: NORMAL
EOSINOPHIL # BLD AUTO: 0.1 K/UL (ref 0–0.5)
EOSINOPHIL NFR BLD: 2.8 % (ref 0–8)
ERYTHROCYTE [DISTWIDTH] IN BLOOD BY AUTOMATED COUNT: 13.5 % (ref 11.5–14.5)
EST. GFR  (AFRICAN AMERICAN): >60 ML/MIN/1.73 M^2
EST. GFR  (NON AFRICAN AMERICAN): >60 ML/MIN/1.73 M^2
ESTIMATED AVG GLUCOSE: 114 MG/DL (ref 68–131)
GLUCOSE SERPL-MCNC: 99 MG/DL (ref 70–110)
HBA1C MFR BLD HPLC: 5.6 % (ref 4–5.6)
HCT VFR BLD AUTO: 47.7 % (ref 40–54)
HCV AB SERPL QL IA: NEGATIVE
HDLC SERPL-MCNC: 71 MG/DL (ref 40–75)
HDLC SERPL: 36 % (ref 20–50)
HGB BLD-MCNC: 15.7 G/DL (ref 14–18)
IMM GRANULOCYTES # BLD AUTO: 0.01 K/UL (ref 0–0.04)
IMM GRANULOCYTES NFR BLD AUTO: 0.2 % (ref 0–0.5)
LDLC SERPL CALC-MCNC: 99 MG/DL (ref 63–159)
LYMPHOCYTES # BLD AUTO: 1.5 K/UL (ref 1–4.8)
LYMPHOCYTES NFR BLD: 30 % (ref 18–48)
MCH RBC QN AUTO: 30.3 PG (ref 27–31)
MCHC RBC AUTO-ENTMCNC: 32.9 G/DL (ref 32–36)
MCV RBC AUTO: 92 FL (ref 82–98)
MONOCYTES # BLD AUTO: 0.5 K/UL (ref 0.3–1)
MONOCYTES NFR BLD: 9.5 % (ref 4–15)
NEUTROPHILS # BLD AUTO: 2.9 K/UL (ref 1.8–7.7)
NEUTROPHILS NFR BLD: 56.7 % (ref 38–73)
NONHDLC SERPL-MCNC: 126 MG/DL
NRBC BLD-RTO: 0 /100 WBC
PLATELET # BLD AUTO: 228 K/UL (ref 150–350)
PMV BLD AUTO: 11.8 FL (ref 9.2–12.9)
POTASSIUM SERPL-SCNC: 4.7 MMOL/L (ref 3.5–5.1)
PROT SERPL-MCNC: 7.3 G/DL (ref 6–8.4)
RBC # BLD AUTO: 5.18 M/UL (ref 4.6–6.2)
SODIUM SERPL-SCNC: 140 MMOL/L (ref 136–145)
TRIGL SERPL-MCNC: 135 MG/DL (ref 30–150)
TSH SERPL DL<=0.005 MIU/L-ACNC: 1.19 UIU/ML (ref 0.4–4)
WBC # BLD AUTO: 5.03 K/UL (ref 3.9–12.7)

## 2019-04-01 PROCEDURE — 84443 ASSAY THYROID STIM HORMONE: CPT

## 2019-04-01 PROCEDURE — 80061 LIPID PANEL: CPT

## 2019-04-01 PROCEDURE — 80053 COMPREHEN METABOLIC PANEL: CPT

## 2019-04-01 PROCEDURE — 86803 HEPATITIS C AB TEST: CPT

## 2019-04-01 PROCEDURE — 84153 ASSAY OF PSA TOTAL: CPT

## 2019-04-01 PROCEDURE — 36415 COLL VENOUS BLD VENIPUNCTURE: CPT | Mod: PO

## 2019-04-01 PROCEDURE — 85025 COMPLETE CBC W/AUTO DIFF WBC: CPT

## 2019-04-01 PROCEDURE — 83036 HEMOGLOBIN GLYCOSYLATED A1C: CPT

## 2019-04-08 ENCOUNTER — OFFICE VISIT (OUTPATIENT)
Dept: FAMILY MEDICINE | Facility: CLINIC | Age: 64
End: 2019-04-08
Payer: COMMERCIAL

## 2019-04-08 VITALS
SYSTOLIC BLOOD PRESSURE: 126 MMHG | TEMPERATURE: 99 F | WEIGHT: 175.94 LBS | DIASTOLIC BLOOD PRESSURE: 80 MMHG | HEIGHT: 74 IN | OXYGEN SATURATION: 96 % | HEART RATE: 68 BPM | BODY MASS INDEX: 22.58 KG/M2

## 2019-04-08 DIAGNOSIS — E78.5 HYPERLIPIDEMIA, UNSPECIFIED HYPERLIPIDEMIA TYPE: ICD-10-CM

## 2019-04-08 DIAGNOSIS — K21.9 GASTROESOPHAGEAL REFLUX DISEASE, ESOPHAGITIS PRESENCE NOT SPECIFIED: ICD-10-CM

## 2019-04-08 DIAGNOSIS — Z12.5 SCREENING FOR PROSTATE CANCER: ICD-10-CM

## 2019-04-08 DIAGNOSIS — Z23 NEED FOR VACCINATION AGAINST STREPTOCOCCUS PNEUMONIAE USING PNEUMOCOCCAL CONJUGATE VACCINE 13: ICD-10-CM

## 2019-04-08 DIAGNOSIS — Z87.19 HISTORY OF DIVERTICULITIS: ICD-10-CM

## 2019-04-08 DIAGNOSIS — Z95.0 CARDIAC PACEMAKER: ICD-10-CM

## 2019-04-08 DIAGNOSIS — I44.1 SECOND DEGREE AV BLOCK: ICD-10-CM

## 2019-04-08 DIAGNOSIS — Z00.00 ROUTINE MEDICAL EXAM: Primary | ICD-10-CM

## 2019-04-08 PROCEDURE — 90471 IMMUNIZATION ADMIN: CPT | Mod: S$GLB,,, | Performed by: INTERNAL MEDICINE

## 2019-04-08 PROCEDURE — 99999 PR PBB SHADOW E&M-EST. PATIENT-LVL III: ICD-10-PCS | Mod: PBBFAC,,, | Performed by: INTERNAL MEDICINE

## 2019-04-08 PROCEDURE — 99999 PR PBB SHADOW E&M-EST. PATIENT-LVL III: CPT | Mod: PBBFAC,,, | Performed by: INTERNAL MEDICINE

## 2019-04-08 PROCEDURE — 90670 PCV13 VACCINE IM: CPT | Mod: S$GLB,,, | Performed by: INTERNAL MEDICINE

## 2019-04-08 PROCEDURE — 99397 PR PREVENTIVE VISIT,EST,65 & OVER: ICD-10-PCS | Mod: 25,S$GLB,, | Performed by: INTERNAL MEDICINE

## 2019-04-08 PROCEDURE — 90471 PNEUMOCOCCAL CONJUGATE VACCINE 13-VALENT LESS THAN 5YO & GREATER THAN: ICD-10-PCS | Mod: S$GLB,,, | Performed by: INTERNAL MEDICINE

## 2019-04-08 PROCEDURE — 90670 PNEUMOCOCCAL CONJUGATE VACCINE 13-VALENT LESS THAN 5YO & GREATER THAN: ICD-10-PCS | Mod: S$GLB,,, | Performed by: INTERNAL MEDICINE

## 2019-04-08 PROCEDURE — 99397 PER PM REEVAL EST PAT 65+ YR: CPT | Mod: 25,S$GLB,, | Performed by: INTERNAL MEDICINE

## 2019-04-08 RX ORDER — SIMVASTATIN 40 MG/1
40 TABLET, FILM COATED ORAL DAILY
Qty: 90 TABLET | Refills: 3 | Status: SHIPPED | OUTPATIENT
Start: 2019-04-08 | End: 2020-06-11

## 2019-04-08 NOTE — PROGRESS NOTES
Chief complaint: Physical exam,     63-year-old white male.  From and health maintenance standpoint we reviewed his  labs.  His colonoscopy was normal in 2013 with a 10 year follow-up since previous polyps were hyperplastic.  He has had episodes of diverticulitis with central lower abdominal pain and we discussed probably best for him to come to the clinic when he gets another episode so we can confirm that it is not other conditions.  He gets occasional reflux we are long conversation regarding Zantac as well as the have the physiology of reflux and reflux precautions.  His labs are unremarkable and his lipids are well controlled.  He has no angina symptoms.    ROS:   CONST: weight stable. EYES: no vision change. ENT: no sore throat. CV: no chest pain w/ exertion. RESP: no shortness of breath. GI: no nausea, vomiting, diarrhea. No dysphagia. : no urinary issues. MUSCULOSKELETAL: no new myalgias or arthralgias. SKIN: no new changes. NEURO: no focal deficits. PSYCH: no new issues. ENDOCRINE: no polyuria. HEME: no lymph nodes. ALLERGY: no general pruritis.          Past Medical History:   Diagnosis Date    2:1 Second degree AV block 3/5/2013    Cardiac pacemaker 3/6/2013    Heart block 3/6/13    S/p DC PM    Hyperlipidemia     Personal history of colonic polyps 12/30/2013    Normal 12/13 ---Repeat colonoscopy in 10 years for screening purposes. (Previous polyps were both hyperplastic)   Diverticulitis  Occasional GERD          Past Surgical History:   Procedure Laterality Date    COLONOSCOPY N/A 12/30/2013    Performed by [facility] at [facility] ([facility] FLR)    inguinal hernia repair      x 2    INSERT / REPLACE / REMOVE PACEMAKER  3/6/13    S/p DC PM       Social History     Social History    Marital status:      Spouse name: N/A    Number of children: 3 boys in college    Years of education: N/A     Occupational History    realator     Social History Main Topics    Smoking status:  "Former Smoker     Types: Cigars     Quit date: 7/23/1973    Smokeless tobacco: Never Used    Alcohol use 6.0 oz/week     5 Glasses of wine, 5 Cans of beer per week      Comment: Daily    Drug use: No    Sexual activity: Not on file     Other Topics Concern    Not on file     Social History Narrative       family history includes Cancer in his father and mother; Diabetes in his mother.      Gen: no distress  EYES: conjunctiva clear, non-icteric, PERRL  ENT: nose clear, nasal mucosa normal, oropharynx clear and moist, teeth good  NECK:supple, thyroid non-palpable  RESP: effort is good, lungs clear  CV: heart RRR w/o murmur, gallops or rubs; no carotid bruits, no edema  GI: abdomen soft, non-distended, non-tender, no hepatosplenomegaly  MS: gait normal, no clubbing or cyanosis of the digits  SKIN: no rashes, warm to touch    Marcos was seen today for annual exam.    Diagnoses and all orders for this visit:      Routine medical exam, up-to-date on age-appropriate cancer screening, will provide Prevnar today and then eventually Pneumovax now that he 63.    Screening for prostate cancer    Hyperlipidemia, unspecified hyperlipidemia type    Gastroesophageal reflux disease, esophagitis presence not specified    History of diverticulitis    2:1 Second degree AV block    Cardiac pacemaker      Clinical no be sensitive so as to not cause any issues regarding evaluation and management issues"This note will not be shared with the patient."  "

## 2019-04-16 ENCOUNTER — OFFICE VISIT (OUTPATIENT)
Dept: OPTOMETRY | Facility: CLINIC | Age: 64
End: 2019-04-16
Payer: COMMERCIAL

## 2019-04-16 DIAGNOSIS — H25.13 NUCLEAR SCLEROSIS, BILATERAL: Primary | ICD-10-CM

## 2019-04-16 DIAGNOSIS — H52.13 MYOPIA OF BOTH EYES WITH REGULAR ASTIGMATISM AND PRESBYOPIA: ICD-10-CM

## 2019-04-16 DIAGNOSIS — H52.4 MYOPIA OF BOTH EYES WITH REGULAR ASTIGMATISM AND PRESBYOPIA: ICD-10-CM

## 2019-04-16 DIAGNOSIS — Z13.5 GLAUCOMA SCREENING: ICD-10-CM

## 2019-04-16 DIAGNOSIS — H52.223 MYOPIA OF BOTH EYES WITH REGULAR ASTIGMATISM AND PRESBYOPIA: ICD-10-CM

## 2019-04-16 PROCEDURE — 92015 PR REFRACTION: ICD-10-PCS | Mod: S$GLB,,, | Performed by: OPTOMETRIST

## 2019-04-16 PROCEDURE — 92015 DETERMINE REFRACTIVE STATE: CPT | Mod: S$GLB,,, | Performed by: OPTOMETRIST

## 2019-04-16 PROCEDURE — 92004 PR EYE EXAM, NEW PATIENT,COMPREHESV: ICD-10-PCS | Mod: S$GLB,,, | Performed by: OPTOMETRIST

## 2019-04-16 PROCEDURE — 99999 PR PBB SHADOW E&M-EST. PATIENT-LVL II: ICD-10-PCS | Mod: PBBFAC,,, | Performed by: OPTOMETRIST

## 2019-04-16 PROCEDURE — 99999 PR PBB SHADOW E&M-EST. PATIENT-LVL II: CPT | Mod: PBBFAC,,, | Performed by: OPTOMETRIST

## 2019-04-16 PROCEDURE — 92004 COMPRE OPH EXAM NEW PT 1/>: CPT | Mod: S$GLB,,, | Performed by: OPTOMETRIST

## 2019-06-25 ENCOUNTER — CLINICAL SUPPORT (OUTPATIENT)
Dept: CARDIOLOGY | Facility: HOSPITAL | Age: 64
End: 2019-06-25
Attending: INTERNAL MEDICINE
Payer: COMMERCIAL

## 2019-06-25 DIAGNOSIS — Z95.0 CARDIAC PACEMAKER IN SITU: ICD-10-CM

## 2019-06-25 PROCEDURE — 93296 REM INTERROG EVL PM/IDS: CPT

## 2019-08-19 ENCOUNTER — CLINICAL SUPPORT (OUTPATIENT)
Dept: CARDIOLOGY | Facility: HOSPITAL | Age: 64
End: 2019-08-19
Payer: MEDICARE

## 2019-08-19 DIAGNOSIS — Z95.0 PRESENCE OF CARDIAC PACEMAKER: ICD-10-CM

## 2019-08-19 DIAGNOSIS — I44.2 ATRIOVENTRICULAR BLOCK, COMPLETE: ICD-10-CM

## 2019-08-19 PROCEDURE — 93294 CARDIAC DEVICE CHECK - REMOTE: ICD-10-PCS | Mod: ,,, | Performed by: INTERNAL MEDICINE

## 2019-08-19 PROCEDURE — 93294 REM INTERROG EVL PM/LDLS PM: CPT | Mod: ,,, | Performed by: INTERNAL MEDICINE

## 2019-09-03 ENCOUNTER — CLINICAL SUPPORT (OUTPATIENT)
Dept: CARDIOLOGY | Facility: HOSPITAL | Age: 64
End: 2019-09-03
Attending: INTERNAL MEDICINE
Payer: COMMERCIAL

## 2019-09-03 ENCOUNTER — OFFICE VISIT (OUTPATIENT)
Dept: ELECTROPHYSIOLOGY | Facility: CLINIC | Age: 64
End: 2019-09-03
Attending: INTERNAL MEDICINE
Payer: COMMERCIAL

## 2019-09-03 ENCOUNTER — HOSPITAL ENCOUNTER (OUTPATIENT)
Dept: CARDIOLOGY | Facility: CLINIC | Age: 64
Discharge: HOME | End: 2019-09-03
Attending: INTERNAL MEDICINE
Payer: COMMERCIAL

## 2019-09-03 VITALS
BODY MASS INDEX: 22.66 KG/M2 | HEART RATE: 61 BPM | WEIGHT: 176.56 LBS | DIASTOLIC BLOOD PRESSURE: 82 MMHG | SYSTOLIC BLOOD PRESSURE: 170 MMHG | HEIGHT: 74 IN

## 2019-09-03 DIAGNOSIS — Z95.0 CARDIAC PACEMAKER: Primary | ICD-10-CM

## 2019-09-03 DIAGNOSIS — I44.2 CHB (COMPLETE HEART BLOCK): ICD-10-CM

## 2019-09-03 DIAGNOSIS — Z95.0 CARDIAC PACEMAKER IN SITU: ICD-10-CM

## 2019-09-03 PROCEDURE — 99214 PR OFFICE/OUTPT VISIT, EST, LEVL IV, 30-39 MIN: ICD-10-PCS | Mod: S$GLB,,, | Performed by: NURSE PRACTITIONER

## 2019-09-03 PROCEDURE — 99214 OFFICE O/P EST MOD 30 MIN: CPT | Mod: S$GLB,,, | Performed by: NURSE PRACTITIONER

## 2019-09-03 PROCEDURE — 93010 ELECTROCARDIOGRAM REPORT: CPT | Mod: S$GLB,,, | Performed by: INTERNAL MEDICINE

## 2019-09-03 PROCEDURE — 99999 PR PBB SHADOW E&M-EST. PATIENT-LVL III: ICD-10-PCS | Mod: PBBFAC,,, | Performed by: NURSE PRACTITIONER

## 2019-09-03 PROCEDURE — 93005 RHYTHM STRIP: ICD-10-PCS | Mod: S$GLB,,, | Performed by: INTERNAL MEDICINE

## 2019-09-03 PROCEDURE — 93010 RHYTHM STRIP: ICD-10-PCS | Mod: S$GLB,,, | Performed by: INTERNAL MEDICINE

## 2019-09-03 PROCEDURE — 99999 PR PBB SHADOW E&M-EST. PATIENT-LVL III: CPT | Mod: PBBFAC,,, | Performed by: NURSE PRACTITIONER

## 2019-09-03 PROCEDURE — 93280 PM DEVICE PROGR EVAL DUAL: CPT

## 2019-09-03 PROCEDURE — 93005 ELECTROCARDIOGRAM TRACING: CPT | Mod: S$GLB,,, | Performed by: INTERNAL MEDICINE

## 2019-09-03 NOTE — PATIENT INSTRUCTIONS
Take blood pressure log at home for 2 weeks. Let clinic know if top number is above 130 on average.   Get echocardiogram.      Taking Your Blood Pressure  Blood pressure is the force of blood against the artery wall as it moves from the heart through the blood vessels. You can take your own blood pressure reading using a digital monitor. Take your readings the same each time, using the same arm. Take readings as often as your healthcare provider instructs.  About blood pressure monitors  Blood pressure monitors are designed for certain ages and cases. You can find monitors for older adults, for pregnant women, and for children. Make sure the one you choose is the right one for your age and situation.  The American Heart Association recommends an automatic cuff monitor that fits on your upper arm (bicep). The cuff should fit your arm size. A cuff thats too large or too small will not give an accurate reading. Measure around your upper arm to find your size.  Monitors that attach to your finger or wrist are not as accurate as monitors for your upper arm.  Ask your healthcare provider for help in choosing a monitor. Bring your monitor to your next provider visit if you need help in using it the correct way.  The steps below are general instructions for using an automatic digital monitor.  Step 1. Relax    · Take your blood pressure at the same time every day, such as in the morning or evening, or at the time your healthcare provider recommends.  · Wait at least a half-hour after smoking, eating, or exercising. Don't drink coffee, tea, soda, or other caffeinated beverages before checking your blood pressure.  · Sit comfortably at a table with both feet on the floor. Do not cross your legs or feet. Place the monitor near you.  · Rest for a few minutes before you begin.  Step 2. Wrap the cuff    · Place your arm on the table, palm up. Your arm should be at the level of your heart. Wrap the cuff around your upper arm,  just above your elbow. Its best done on bare skin, not over clothing. Most cuffs will indicate where the brachial artery (the blood vessel in the middle of the arm at the inner side of the elbow) should line up with the cuff. Look in your monitor's instruction booklet for an illustration. You can also bring your cuff to your healthcare provider and have them show you how to correctly place the cuff.  Step 3. Inflate the cuff    · Push the button that starts the pump.  · The cuff will tighten, then loosen.  · The numbers will change. When they stop changing, your blood pressure reading will appear.  · Take 2 or 3 readings one minute apart.  Step 4. Write down the results of each reading    · Write down your blood pressure numbers for each reading. Note the date and time. Keep your results in one place, such as a notebook. Even if your monitor has a built-in memory, keep a hard copy of the readings.  · Remove the cuff from your arm. Turn off the machine.  · Bring your blood pressure records with your healthcare providers at each visit.  · If you start a new blood pressure medicine, note the day you started the new medicine. Also note the day if you change the dose of your medicine. This information goes on your blood pressure recording sheet. This will help your healthcare provider monitor how well the medicine changes are working.  · Ask your healthcare provider what numbers should prompt you to call him or her. Also ask what numbers should prompt you to get help right away.  Date Last Reviewed: 11/1/2016  © 9171-9182 [employer]. [address] [place] [address], [place] [address]. All rights reserved. This information is not intended as a substitute for professional medical care. Always follow your healthcare professional's instructions.

## 2019-09-03 NOTE — LETTER
September 3, 2019      Jarod Hanosn MD  [address]  [facility] [address]           [facility] - Arrhythmia  [address]  [facility] [address]  Phone: [phone]  Fax: [phone]          Patient: Marcos Elaine   MR Number: [id number]   YOB: 1955   Date of Visit: 9/3/2019       Dear Dr. Jarod Hanson:    Thank you for referring Marcos Elaine to me for evaluation. Attached you will find relevant portions of my assessment and plan of care.    If you have questions, please do not hesitate to call me. I look forward to following Marcos Elaine along with you.    Sincerely,    Sara White, SANTY    Enclosure  CC:  No Recipients    If you would like to receive this communication electronically, please contact [email or web address] or [phone] to request more information on [employer] access.    For providers and/or their staff who would like to refer a patient to [facility], please contact us through our one-stop-shop provider referral line, [facility], at [phone].    If you feel you have received this communication in error or would no longer like to receive these types of communications, please e-mail [email or web address]

## 2019-09-03 NOTE — PROGRESS NOTES
Mr. Ealine is a patient of Dr. Hanson and was last seen in clinic 8/28/2018.      Subjective:   Patient ID:  Marcos Elaine is a 63 y.o. male who presents for follow-up of Pacemaker Check  .     HPI:    Mr. Elaine is a 63 y.o. male with AVB, PPM (2018) here for annual follow up.     Background:    History of 2:1 AVB s/p DC PM 3/6/68358 here for long term follow up.   At his last clinic appt 8/2017: 24% RV pacing.   [place] well at clinic follow up 8/2018. CHB and 94% RV pacing. Echo showed EF 50-55%.    Update (09/03/2019):    Today he says he continues to feel well. No cardiac complaints. Mr. Elaine denies chest pain with exertion or at rest, palpitations, SOB, PAUL, dizziness, or syncope.    Device Interrogation (9/3/2019) reveals an intrinsic SR with CHB with stable lead and device function. No arrhythmias or treated episodes were noted.  He paces 99% in the RV. Estimated battery longevity 9 years.     I have personally reviewed the patient's EKG today, which shows ASVP at 64bpm. IA interval is 184. QRS is 176. QTc is 499.    Recent Cardiac Tests:    2D Echo (8/29/2018):  CONCLUSIONS     1 - Low normal to mildly depressed left ventricular systolic function (EF 50-55%).     2 - Normal right ventricular systolic function .     3 - Trivial aortic regurgitation.     4 - Mild mitral regurgitation.     5 - Trivial tricuspid regurgitation.     6 - Mild left atrial enlargement.     Current Outpatient Medications   Medication Sig    CALCIUM POLYCARBOPHIL (KONSYL FIBER ORAL) Take by mouth.    glucosamine-chondroitin 500-400 mg tablet Take 1 tablet by mouth 3 (three) times daily.    multivitamin capsule Take 1 capsule by mouth once daily.    simvastatin (ZOCOR) 40 MG tablet Take 1 tablet (40 mg total) by mouth once daily.     No current facility-administered medications for this visit.        Review of Systems   Constitution: Negative for malaise/fatigue.   Cardiovascular: Negative for chest pain, dyspnea on exertion,  "irregular heartbeat, leg swelling and palpitations.   Respiratory: Negative for shortness of breath.    Hematologic/Lymphatic: Negative for bleeding problem.   Skin: Negative for rash.   Musculoskeletal: Negative for myalgias.   Gastrointestinal: Negative for hematemesis, hematochezia and nausea.   Genitourinary: Negative for hematuria.   Neurological: Negative for light-headedness.   Psychiatric/Behavioral: Negative for altered mental status.   Allergic/Immunologic: Negative for persistent infections.         Objective:          BP (!) 170/82   Pulse 61   Ht 6' 2" (1.88 m)   Wt 80.1 kg (176 lb 9.4 oz)   BMI 22.67 kg/m²     Physical Exam   Constitutional: He is oriented to person, place, and time. He appears well-developed and well-nourished.   HENT:   Head: Normocephalic.   Nose: Nose normal.   Eyes: Pupils are equal, round, and reactive to light.   Cardiovascular: Normal rate, regular rhythm, S1 normal and S2 normal.   No murmur heard.  Pulses:       Radial pulses are 2+ on the right side, and 2+ on the left side.   Pulmonary/Chest: Breath sounds normal. No respiratory distress.   Device to LUCW.   Abdominal: Normal appearance.   Musculoskeletal: Normal range of motion. He exhibits no edema.   Neurological: He is alert and oriented to person, place, and time.   Skin: Skin is warm and dry. No erythema.   Psychiatric: He has a normal mood and affect. His speech is normal and behavior is normal.   Nursing note and vitals reviewed.    Lab Results   Component Value Date     04/01/2019    K 4.7 04/01/2019    MG 2.5 03/05/2013    BUN 10 04/01/2019    CREATININE 0.9 04/01/2019    ALT 23 04/01/2019    AST 23 04/01/2019    HGB 15.7 04/01/2019    HCT 47.7 04/01/2019    TSH 1.188 04/01/2019    LDLCALC 99.0 04/01/2019           Assessment:     1. Cardiac pacemaker    2. CHB (complete heart block)        Plan:     In summary, Mr. Elaine is a 63 y.o. male with AVB, PPM (2018) here for annual follow up.   Mr. Elaine " is doing well from a device perspective with stable lead and device function. No arrhythmia noted. CHB with 99% RV pacing. No CHF symptoms. Will obtain updated echo. Hypertensive in clinic today which is unusual for him. He will collect 2 weeks home BP log.    Echo. (UPDATE: Echo shows EF 45%; Plan will be to upgrade to CRT-P).  BP log.  Let clinic know if top number is above 130 on average.   Continue current medication regimen and device settings.   Follow up in device clinic as scheduled.   Follow up in EP clinic in 1 year, sooner as needed.     *A copy of this note has been sent to Dr. Hanson*    Follow up in about 1 year (around 9/3/2020).    ------------------------------------------------------------------    SERENA Carbajal, NP-C  Cardiac Electrophysiology

## 2019-09-06 ENCOUNTER — HOSPITAL ENCOUNTER (OUTPATIENT)
Dept: CARDIOLOGY | Facility: CLINIC | Age: 64
Discharge: HOME | End: 2019-09-06
Attending: NURSE PRACTITIONER
Payer: COMMERCIAL

## 2019-09-06 ENCOUNTER — TELEPHONE (OUTPATIENT)
Dept: ELECTROPHYSIOLOGY | Facility: CLINIC | Age: 64
End: 2019-09-06

## 2019-09-06 VITALS
HEART RATE: 68 BPM | SYSTOLIC BLOOD PRESSURE: 132 MMHG | WEIGHT: 177 LBS | HEIGHT: 74 IN | BODY MASS INDEX: 22.72 KG/M2 | DIASTOLIC BLOOD PRESSURE: 72 MMHG

## 2019-09-06 DIAGNOSIS — I44.2 CHB (COMPLETE HEART BLOCK): ICD-10-CM

## 2019-09-06 LAB
ASCENDING AORTA: 3.59 CM
AV INDEX (PROSTH): 0.7
AV MEAN GRADIENT: 2 MMHG
AV PEAK GRADIENT: 4 MMHG
AV VALVE AREA: 2.82 CM2
AV VELOCITY RATIO: 0.76
BSA FOR ECHO PROCEDURE: 2.05 M2
CV ECHO LV RWT: 0.4 CM
DOP CALC AO PEAK VEL: 1.04 M/S
DOP CALC AO VTI: 22.35 CM
DOP CALC LVOT AREA: 4 CM2
DOP CALC LVOT DIAMETER: 2.26 CM
DOP CALC LVOT PEAK VEL: 0.79 M/S
DOP CALC LVOT STROKE VOLUME: 63.03 CM3
DOP CALCLVOT PEAK VEL VTI: 15.72 CM
E WAVE DECELERATION TIME: 220 MSEC
E/A RATIO: 0.63
E/E' RATIO: 7.71 M/S
ECHO LV POSTERIOR WALL: 1.03 CM (ref 0.6–1.1)
FRACTIONAL SHORTENING: 26 % (ref 28–44)
INTERVENTRICULAR SEPTUM: 1.01 CM (ref 0.6–1.1)
IVRT: 0.09 MSEC
LA MAJOR: 4.3 CM
LA MINOR: 4.19 CM
LA WIDTH: 4.51 CM
LEFT ATRIUM SIZE: 4.5 CM
LEFT ATRIUM VOLUME INDEX: 35.5 ML/M2
LEFT ATRIUM VOLUME: 73.22 CM3
LEFT INTERNAL DIMENSION IN SYSTOLE: 3.86 CM (ref 2.1–4)
LEFT VENTRICLE DIASTOLIC VOLUME INDEX: 63.15 ML/M2
LEFT VENTRICLE DIASTOLIC VOLUME: 130.28 ML
LEFT VENTRICLE MASS INDEX: 97 G/M2
LEFT VENTRICLE SYSTOLIC VOLUME INDEX: 31.2 ML/M2
LEFT VENTRICLE SYSTOLIC VOLUME: 64.33 ML
LEFT VENTRICULAR INTERNAL DIMENSION IN DIASTOLE: 5.21 CM (ref 3.5–6)
LEFT VENTRICULAR MASS: 199.99 G
LV LATERAL E/E' RATIO: 6.75 M/S
LV SEPTAL E/E' RATIO: 9 M/S
MV PEAK A VEL: 0.86 M/S
MV PEAK E VEL: 0.54 M/S
RA MAJOR: 3.95 CM
RA PRESSURE: 3 MMHG
RA WIDTH: 3.64 CM
RV TISSUE DOPPLER FREE WALL SYSTOLIC VELOCITY 1 (APICAL 4 CHAMBER VIEW): 10.82 CM/S
SINUS: 3.45 CM
STJ: 2.89 CM
TDI LATERAL: 0.08 M/S
TDI SEPTAL: 0.06 M/S
TDI: 0.07 M/S
TRICUSPID ANNULAR PLANE SYSTOLIC EXCURSION: 2.59 CM

## 2019-09-06 PROCEDURE — 93306 TTE W/DOPPLER COMPLETE: CPT

## 2019-09-06 PROCEDURE — 93306 ECHO (CUPID ONLY): ICD-10-PCS | Mod: 26,,, | Performed by: INTERNAL MEDICINE

## 2019-09-06 PROCEDURE — 93306 TTE W/DOPPLER COMPLETE: CPT | Mod: 26,,, | Performed by: INTERNAL MEDICINE

## 2019-09-06 NOTE — TELEPHONE ENCOUNTER
Spoke with patient and relayed results/recommendations, per Sara's note. Patient verbalized understanding and CRT-P upgrade scheduled for 10/14/2019. Will send all information through his patient portal, as requested.

## 2019-09-06 NOTE — TELEPHONE ENCOUNTER
----- Message from Sara White NP sent at 9/6/2019  3:35 PM CDT -----  Leonel Maxwell,  Can you let this patient know that his echo shows a mild decrease in heart function compared to last year and Dr. Hanson would like to go ahead and schedule the 3rd pacemaker lead? I talked about this with him at length in clinic recently so he should not need another clinic visit before scheduling, but if he would prefer to see us 1st and talk about it, that is fine too.  Thanks,  Sara  ----- Message -----  From: Jarod Hanson MD  Sent: 9/6/2019   9:42 AM  To: Sara White NP    Yes, we should offer him CRT-P. I can see him if he wants or we can arrange the case. MB  ----- Message -----  From: Sara White NP  Sent: 9/6/2019   8:53 AM  To: Jarod Hanson MD    CHB with 100% RV pacing. EF normal prior to PPM, down to 50% (2018), now 45%. Consider CRT-P upgrade?

## 2019-09-07 ENCOUNTER — PATIENT MESSAGE (OUTPATIENT)
Dept: ELECTROPHYSIOLOGY | Facility: CLINIC | Age: 64
End: 2019-09-07

## 2019-09-09 DIAGNOSIS — I42.0 DILATED CARDIOMYOPATHY: ICD-10-CM

## 2019-09-09 DIAGNOSIS — I44.2 CHB (COMPLETE HEART BLOCK): Primary | ICD-10-CM

## 2019-09-17 ENCOUNTER — TELEPHONE (OUTPATIENT)
Dept: ELECTROPHYSIOLOGY | Facility: CLINIC | Age: 64
End: 2019-09-17

## 2019-09-17 ENCOUNTER — PATIENT MESSAGE (OUTPATIENT)
Dept: MEDSURG UNIT | Facility: HOSPITAL | Age: 64
End: 2019-09-17

## 2019-09-17 DIAGNOSIS — I42.0 DILATED CARDIOMYOPATHY: Primary | ICD-10-CM

## 2019-09-17 NOTE — TELEPHONE ENCOUNTER
----- Message from Jarod Hanson MD sent at 9/17/2019  2:01 PM CDT -----  Contact: Self  Is there a way to get a remote device check? MB  ----- Message -----  From: Vadim Rowe MA  Sent: 9/17/2019   1:16 PM  To: Jarod Hanson MD, #    Pt had another episode that lasted about 45 minutes.  Doing well, now.  This is just an FYI.  Vadim.       ----- Message -----  From: Donna Hinojosa  Sent: 9/17/2019   8:07 AM  To: Valentin Olivera Staff    Pt not feeling well-SO,  heart erratic & has pressure on his chest.  Thanks    [phone]

## 2019-09-17 NOTE — TELEPHONE ENCOUNTER
Called pt, walked him through manual transmission. Reviewed remote report. No alerts noted. No AMS or HVR episodes noted. Pt complained of multiple episodes of heart racing and SOB. Information relayed to patient and YIN Maxwell to speak to Dr. Hanson about follow up recommendations.

## 2019-09-17 NOTE — TELEPHONE ENCOUNTER
----- Message from Maricruz Sahni RN sent at 9/17/2019  2:39 PM CDT -----  Contact: Self      ----- Message -----  From: Gladys Daniels RN  Sent: 9/17/2019   2:19 PM  To: [facility] Arrhythmia Device Staff    I spoke with patient. He does not know how to send a manual transmission. Can you please call to walk him through it?  Thanks  ----- Message -----  From: Jarod Hanson MD  Sent: 9/17/2019   2:01 PM  To: Gladys Daniels, RN, #    Is there a way to get a remote device check? MB  ----- Message -----  From: Vadim Rowe MA  Sent: 9/17/2019   1:16 PM  To: Jarod Hanson MD, #    Pt had another episode that lasted about 45 minutes.  Doing well, now.  This is just an FYI.  Vadim.       ----- Message -----  From: Donna Hinojosa  Sent: 9/17/2019   8:07 AM  To: Valentin Olivera Staff    Pt not feeling well-SO,  heart erratic & has pressure on his chest.  Thanks    [phone]

## 2019-09-17 NOTE — TELEPHONE ENCOUNTER
Spoke with patient. He was sitting at his desk and had sudden onset of what felt like erratic heartbeat with sob and chest pressure. This lasted about 45 min and resolved on it's own. He is feeling fine right now. States the same thing happened Saturday night. He sent a transmission which Maricruz reviewed: no AF detected, no mode switches, no evidence of any high ventricular rates. Will review with Dr Hanson and call him back with recommendations. He verbalizes understanding.

## 2019-09-17 NOTE — TELEPHONE ENCOUNTER
Discussed with Dr Hanson. Transmission reviewed. He recommends that patient get PET Stress prior to CRT-P upgrade to rule out ischemia. Spoke with patient and relayed his recommendations. PET Stress scheduled for 9/25/2019 at 2:30pm. Instructions reviewed. Also advised that if this kind of episode happens again, he should go to ER for evaluation. He verbalizes understanding.

## 2019-09-23 ENCOUNTER — TELEPHONE (OUTPATIENT)
Dept: CARDIOLOGY | Facility: CLINIC | Age: 64
End: 2019-09-23

## 2019-09-24 ENCOUNTER — PATIENT MESSAGE (OUTPATIENT)
Dept: ELECTROPHYSIOLOGY | Facility: CLINIC | Age: 64
End: 2019-09-24

## 2019-09-24 ENCOUNTER — CLINICAL SUPPORT (OUTPATIENT)
Dept: CARDIOLOGY | Facility: HOSPITAL | Age: 64
End: 2019-09-24
Attending: INTERNAL MEDICINE
Payer: COMMERCIAL

## 2019-09-24 DIAGNOSIS — Z95.0 CARDIAC PACEMAKER IN SITU: ICD-10-CM

## 2019-09-24 PROCEDURE — 93296 REM INTERROG EVL PM/IDS: CPT

## 2019-09-25 ENCOUNTER — CLINICAL SUPPORT (OUTPATIENT)
Dept: CARDIOLOGY | Facility: CLINIC | Age: 64
End: 2019-09-25
Attending: INTERNAL MEDICINE
Payer: COMMERCIAL

## 2019-09-25 ENCOUNTER — PATIENT MESSAGE (OUTPATIENT)
Dept: MEDSURG UNIT | Facility: HOSPITAL | Age: 64
End: 2019-09-25

## 2019-09-25 DIAGNOSIS — I42.0 DILATED CARDIOMYOPATHY: ICD-10-CM

## 2019-09-25 NOTE — NURSING
Patient was here for a pet stress test, but he is claustrophobic and can not go into the scanner. Pt thinks he could try again with anxiety medication or possibly do a nuclear spect stress test. Message sent to Dr. Hanson's office to follow up.

## 2019-09-26 ENCOUNTER — TELEPHONE (OUTPATIENT)
Dept: ELECTROPHYSIOLOGY | Facility: CLINIC | Age: 64
End: 2019-09-26

## 2019-09-26 NOTE — TELEPHONE ENCOUNTER
----- Message from Jarod Hanson MD sent at 9/26/2019 12:38 PM CDT -----  Regarding: RE: cardiac pet stress test  I would push it back. We need to verify its not ischemia MB  ----- Message -----  From: Gladys Daniels RN  Sent: 9/26/2019  10:32 AM CDT  To: Jarod Hanson MD  Subject: FW: cardiac pet stress test                      We are unable to get PET Stress prior to device upgrade on 10/14/19. Do I need to push back the device upgrade or can we proceed without PET Stress?  ----- Message -----  From: Sagrario Troy RN  Sent: 9/26/2019  10:16 AM CDT  To: Gladys Daniels RN  Subject: RE: cardiac pet stress test                      Not at this time.. sorry  ----- Message -----  From: Gladys Daniels RN  Sent: 9/26/2019   9:07 AM CDT  To: Sagrario Troy RN  Subject: RE: cardiac pet stress test                      No urgent slots available at all?  ----- Message -----  From: Sagrario Troy RN  Sent: 9/26/2019   8:16 AM CDT  To: Gladys Daniels RN  Subject: RE: cardiac pet stress test                      Hey, the closest appt I have is Oct 14 @ 2:30.. We are completely booked right now.     Sagrario Troy RN   [id number]  ----- Message -----  From: Gladys Daniels RN  Sent: 9/26/2019   7:45 AM CDT  To: Sagrario Troy RN  Subject: FW: cardiac pet stress test                      Would you be able to reschedule him? Dr Hanson will give valium. We need to get this done before his procedure on 10/14/19  Thanks!  ----- Message -----  From: Jarod Hanson MD  Sent: 9/25/2019   6:59 PM CDT  To: Gladys Daniels RN  Subject: RE: cardiac pet stress test                      Let's try valium 5 mg PRN MB  ----- Message -----  From: Gladys Daniels RN  Sent: 9/25/2019   3:51 PM CDT  To: Jarod Hanson MD  Subject: FW: cardiac pet stress test                      Please advise. You wanted PET stress prior to CRT upgrade (10/14/19) to rule out ischemia because of his reported  episodes of chest tightness.  ----- Message -----  From: Vadim Rowe MA  Sent: 9/25/2019   3:27 PM CDT  To: Gladys Daniels RN  Subject: FW: cardiac pet stress test                          ----- Message -----  From: Sagrario Troy RN  Sent: 9/25/2019   2:27 PM CDT  To: Valentin Olivera Staff  Subject: cardiac pet stress test                          Hi, the above pt was here for a pet stress test, but he is claustrophobic and can not go into the scanner. Pt thinks he could try again with anxiety medication or possibly do a nuclear spect stress test. Please follow up with pt to decide next step.     Sagrario Troy RN  [id number]

## 2019-09-26 NOTE — TELEPHONE ENCOUNTER
----- Message from Sagrario Troy RN sent at 9/26/2019 10:53 AM CDT -----  Regarding: RE: cardiac pet stress test  Ok.. I put him in. Thanks!  ----- Message -----  From: Gladys Daniels RN  Sent: 9/26/2019  10:47 AM CDT  To: Sagrario Troy RN  Subject: RE: cardiac pet stress test                      We'll take it! I will call him and send in the Valium   Thanks!!  ----- Message -----  From: Sagrario Troy RN  Sent: 9/26/2019  10:45 AM CDT  To: Gladys Daniels RN  Subject: RE: cardiac pet stress test                      I just had a cancellation, so I put him on oct 9 at 8:30.. Please let me know if that works  ----- Message -----  From: Gladys Daniels RN  Sent: 9/26/2019   7:45 AM CDT  To: Sagrario Troy RN  Subject: FW: cardiac pet stress test                      Would you be able to reschedule him? Dr Hanson will give valium. We need to get this done before his procedure on 10/14/19  Thanks!  ----- Message -----  From: Jarod Hanson MD  Sent: 9/25/2019   6:59 PM CDT  To: Gladys Daniels RN  Subject: RE: cardiac pet stress test                      Let's try valium 5 mg PRN MB  ----- Message -----  From: Gladys Daniels RN  Sent: 9/25/2019   3:51 PM CDT  To: Jarod Hanson MD  Subject: FW: cardiac pet stress test                      Please advise. You wanted PET stress prior to CRT upgrade (10/14/19) to rule out ischemia because of his reported episodes of chest tightness.  ----- Message -----  From: Vadim Rowe MA  Sent: 9/25/2019   3:27 PM CDT  To: Gladys Daniels RN  Subject: FW: cardiac pet stress test                          ----- Message -----  From: Sagrario Troy RN  Sent: 9/25/2019   2:27 PM CDT  To: Valentin Olivera Staff  Subject: cardiac pet stress test                          Hi, the above pt was here for a pet stress test, but he is claustrophobic and can not go into the scanner. Pt thinks he could try again with anxiety medication or possibly do a  nuclear spect stress test. Please follow up with pt to decide next step.     Sagrario Troy,RN  [id number]

## 2019-09-26 NOTE — TELEPHONE ENCOUNTER
Spoke with a patient and advised of Dr Hanson's recommendation to take Valium 5mg one hour prior to arrival for PET Stress and one Valium 5mg upon arrival. Rx called in to [facility] as requested. He is now scheduled for 10/9/19 at 8:30am

## 2019-10-07 ENCOUNTER — TELEPHONE (OUTPATIENT)
Dept: CARDIOLOGY | Facility: CLINIC | Age: 64
End: 2019-10-07

## 2019-10-07 ENCOUNTER — LAB VISIT (OUTPATIENT)
Dept: LAB | Facility: HOSPITAL | Age: 64
End: 2019-10-07
Attending: INTERNAL MEDICINE
Payer: COMMERCIAL

## 2019-10-07 DIAGNOSIS — I44.2 CHB (COMPLETE HEART BLOCK): ICD-10-CM

## 2019-10-07 DIAGNOSIS — I42.0 DILATED CARDIOMYOPATHY: ICD-10-CM

## 2019-10-07 LAB
ANION GAP SERPL CALC-SCNC: 5 MMOL/L (ref 8–16)
APTT BLDCRRT: 24.7 SEC (ref 21–32)
BASOPHILS # BLD AUTO: 0.03 K/UL (ref 0–0.2)
BASOPHILS NFR BLD: 0.6 % (ref 0–1.9)
BUN SERPL-MCNC: 10 MG/DL (ref 8–23)
CALCIUM SERPL-MCNC: 9.8 MG/DL (ref 8.7–10.5)
CHLORIDE SERPL-SCNC: 105 MMOL/L (ref 95–110)
CO2 SERPL-SCNC: 29 MMOL/L (ref 23–29)
CREAT SERPL-MCNC: 0.9 MG/DL (ref 0.5–1.4)
DIFFERENTIAL METHOD: NORMAL
EOSINOPHIL # BLD AUTO: 0.2 K/UL (ref 0–0.5)
EOSINOPHIL NFR BLD: 4.5 % (ref 0–8)
ERYTHROCYTE [DISTWIDTH] IN BLOOD BY AUTOMATED COUNT: 13.2 % (ref 11.5–14.5)
EST. GFR  (AFRICAN AMERICAN): >60 ML/MIN/1.73 M^2
EST. GFR  (NON AFRICAN AMERICAN): >60 ML/MIN/1.73 M^2
GLUCOSE SERPL-MCNC: 100 MG/DL (ref 70–110)
HCT VFR BLD AUTO: 48.1 % (ref 40–54)
HGB BLD-MCNC: 15.5 G/DL (ref 14–18)
IMM GRANULOCYTES # BLD AUTO: 0.01 K/UL (ref 0–0.04)
IMM GRANULOCYTES NFR BLD AUTO: 0.2 % (ref 0–0.5)
INR PPP: 0.9 (ref 0.8–1.2)
LYMPHOCYTES # BLD AUTO: 1.2 K/UL (ref 1–4.8)
LYMPHOCYTES NFR BLD: 25.9 % (ref 18–48)
MCH RBC QN AUTO: 29.9 PG (ref 27–31)
MCHC RBC AUTO-ENTMCNC: 32.2 G/DL (ref 32–36)
MCV RBC AUTO: 93 FL (ref 82–98)
MONOCYTES # BLD AUTO: 0.4 K/UL (ref 0.3–1)
MONOCYTES NFR BLD: 8.6 % (ref 4–15)
NEUTROPHILS # BLD AUTO: 2.8 K/UL (ref 1.8–7.7)
NEUTROPHILS NFR BLD: 60.2 % (ref 38–73)
NRBC BLD-RTO: 0 /100 WBC
PLATELET # BLD AUTO: 198 K/UL (ref 150–350)
PMV BLD AUTO: 12.3 FL (ref 9.2–12.9)
POTASSIUM SERPL-SCNC: 4.5 MMOL/L (ref 3.5–5.1)
PROTHROMBIN TIME: 10 SEC (ref 9–12.5)
RBC # BLD AUTO: 5.18 M/UL (ref 4.6–6.2)
SODIUM SERPL-SCNC: 139 MMOL/L (ref 136–145)
WBC # BLD AUTO: 4.63 K/UL (ref 3.9–12.7)

## 2019-10-07 PROCEDURE — 36415 COLL VENOUS BLD VENIPUNCTURE: CPT | Mod: PO

## 2019-10-07 PROCEDURE — 85730 THROMBOPLASTIN TIME PARTIAL: CPT

## 2019-10-07 PROCEDURE — 85610 PROTHROMBIN TIME: CPT

## 2019-10-07 PROCEDURE — 80048 BASIC METABOLIC PNL TOTAL CA: CPT

## 2019-10-07 PROCEDURE — 85025 COMPLETE CBC W/AUTO DIFF WBC: CPT

## 2019-10-09 ENCOUNTER — CLINICAL SUPPORT (OUTPATIENT)
Dept: CARDIOLOGY | Facility: CLINIC | Age: 64
End: 2019-10-09
Attending: INTERNAL MEDICINE
Payer: COMMERCIAL

## 2019-10-09 VITALS — HEART RATE: 68 BPM | SYSTOLIC BLOOD PRESSURE: 110 MMHG | DIASTOLIC BLOOD PRESSURE: 55 MMHG

## 2019-10-09 LAB
CFR FLOW - ANTERIOR: 2.81 CC/MIN/G
CFR FLOW - INFERIOR: 2.58 CC/MIN/G
CFR FLOW - LATERAL: 2.51 CC/MIN/G
CFR FLOW - MAX: 3.5 CC/MIN/G
CFR FLOW - MIN: 1.9 CC/MIN/G
CFR FLOW - SEPTAL: 2.9 CC/MIN/G
CFR FLOW - WHOLE HEART: 2.7 CC/MIN/G
CV PHARM DOSE: 45.1 MG
CV STRESS BASE HR: 68 BPM
DIASTOLIC BLOOD PRESSURE: 84 MMHG
END DIASTOLIC INDEX-HIGH: 170 ML/M2
END SYSTOLIC INDEX-HIGH: 70 ML/M2
NUC REST DIASTOLIC VOLUME INDEX: 96
NUC REST EJECTION FRACTION: 51
NUC REST SYSTOLIC VOLUME INDEX: 47
NUC STRESS DIASTOLIC VOLUME INDEX: 121
NUC STRESS EJECTION FRACTION: 66 %
NUC STRESS SYSTOLIC VOLUME INDEX: 41
OHS CV CPX 85 PERCENT MAX PREDICTED HEART RATE MALE: 132
OHS CV CPX MAX PREDICTED HEART RATE: 155
OHS CV CPX PATIENT IS FEMALE: 0
OHS CV CPX PATIENT IS MALE: 1
OHS CV CPX PEAK DIASTOLIC BLOOD PRESSURE: 65 MMHG
OHS CV CPX PEAK HEAR RATE: 72 BPM
OHS CV CPX PEAK RATE PRESSURE PRODUCT: 9576
OHS CV CPX PEAK SYSTOLIC BLOOD PRESSURE: 133 MMHG
OHS CV CPX PERCENT MAX PREDICTED HEART RATE ACHIEVED: 46
OHS CV CPX RATE PRESSURE PRODUCT PRESENTING: 8772
REST FLOW - ANTERIOR: 0.96 CC/MIN/G
REST FLOW - INFERIOR: 0.95 CC/MIN/G
REST FLOW - LATERAL: 1.17 CC/MIN/G
REST FLOW - MAX: 1.4 CC/MIN/G
REST FLOW - MIN: 0.6 CC/MIN/G
REST FLOW - SEPTAL: 0.79 CC/MIN/G
REST FLOW - WHOLE HEART: 0.97
RETIRED EF AND QEF - SEE NOTES: 51 %
STRESS ECHO TARGET HR: 131.75 BPM
STRESS FLOW - ANTERIOR: 2.7 CC/MIN/G
STRESS FLOW - INFERIOR: 2.43 CC/MIN/G
STRESS FLOW - LATERAL: 2.9 CC/MIN/G
STRESS FLOW - MAX: 3.7 CC/MIN/G
STRESS FLOW - MIN: 1.7 CC/MIN/G
STRESS FLOW - SEPTAL: 2.28 CC/MIN/G
STRESS FLOW - WHOLE HEART: 2.58 CC/MIN/G
SYSTOLIC BLOOD PRESSURE: 129 MMHG

## 2019-10-09 PROCEDURE — 99999 PR PBB SHADOW E&M-EST. PATIENT-LVL II: ICD-10-PCS | Mod: PBBFAC,,,

## 2019-10-09 PROCEDURE — 99999 PR PBB SHADOW E&M-EST. PATIENT-LVL II: CPT | Mod: PBBFAC,,,

## 2019-10-09 RX ORDER — AMINOPHYLLINE 25 MG/ML
75 INJECTION, SOLUTION INTRAVENOUS
Status: COMPLETED | OUTPATIENT
Start: 2019-10-09 | End: 2019-10-09

## 2019-10-09 RX ORDER — DIPYRIDAMOLE 5 MG/ML
45.05 INJECTION INTRAVENOUS
Status: COMPLETED | OUTPATIENT
Start: 2019-10-09 | End: 2019-10-09

## 2019-10-09 RX ADMIN — AMINOPHYLLINE 75 MG: 25 INJECTION, SOLUTION INTRAVENOUS at 09:10

## 2019-10-09 RX ADMIN — DIPYRIDAMOLE 45.05 MG: 5 INJECTION INTRAVENOUS at 09:10

## 2019-10-11 ENCOUNTER — OFFICE VISIT (OUTPATIENT)
Dept: FAMILY MEDICINE | Facility: CLINIC | Age: 64
End: 2019-10-11
Payer: COMMERCIAL

## 2019-10-11 ENCOUNTER — TELEPHONE (OUTPATIENT)
Dept: ELECTROPHYSIOLOGY | Facility: CLINIC | Age: 64
End: 2019-10-11

## 2019-10-11 VITALS
WEIGHT: 173.94 LBS | DIASTOLIC BLOOD PRESSURE: 79 MMHG | RESPIRATION RATE: 17 BRPM | BODY MASS INDEX: 22.32 KG/M2 | TEMPERATURE: 98 F | HEIGHT: 74 IN | OXYGEN SATURATION: 98 % | SYSTOLIC BLOOD PRESSURE: 140 MMHG | HEART RATE: 61 BPM

## 2019-10-11 DIAGNOSIS — J06.9 VIRAL URI: Primary | ICD-10-CM

## 2019-10-11 PROCEDURE — 99214 PR OFFICE/OUTPT VISIT, EST, LEVL IV, 30-39 MIN: ICD-10-PCS | Mod: S$GLB,,, | Performed by: FAMILY MEDICINE

## 2019-10-11 PROCEDURE — 99214 OFFICE O/P EST MOD 30 MIN: CPT | Mod: S$GLB,,, | Performed by: FAMILY MEDICINE

## 2019-10-11 PROCEDURE — 99999 PR PBB SHADOW E&M-EST. PATIENT-LVL III: CPT | Mod: PBBFAC,,, | Performed by: FAMILY MEDICINE

## 2019-10-11 PROCEDURE — 99999 PR PBB SHADOW E&M-EST. PATIENT-LVL III: ICD-10-PCS | Mod: PBBFAC,,, | Performed by: FAMILY MEDICINE

## 2019-10-11 RX ORDER — DIAZEPAM 5 MG/1
5 TABLET ORAL EVERY 12 HOURS PRN
Status: ON HOLD | COMMUNITY
Start: 2019-09-26 | End: 2022-06-07

## 2019-10-11 RX ORDER — FLUTICASONE PROPIONATE 50 MCG
1 SPRAY, SUSPENSION (ML) NASAL DAILY
Qty: 16 G | Status: SHIPPED | OUTPATIENT
Start: 2019-10-11 | End: 2019-10-11

## 2019-10-11 RX ORDER — FLUTICASONE PROPIONATE 50 MCG
1 SPRAY, SUSPENSION (ML) NASAL DAILY
Qty: 16 G | Refills: 2 | Status: SHIPPED | OUTPATIENT
Start: 2019-10-11 | End: 2022-07-12

## 2019-10-11 NOTE — TELEPHONE ENCOUNTER
Spoke to  Marcos Elaine    CONFIRMED procedure arrival time of 10:30 on 10/14  Reiterated instructions including:  -Directions to check in desk  -NPO after midnight night prior to procedure  -Pre-procedure LABS 10/7. Labs received. No alerts noted  -Confirmed no recent fever, bleeding, infection or skin rash in the past 30 days  -Bathe night prior and morning prior to procedure with Hibiclens solution or an antibacterial soap     Pt verbalizes understanding of above and appreciates call.

## 2019-10-11 NOTE — PROGRESS NOTES
Routine Office Visit    Patient Name: Marcos Elaine    : 1955  MRN: [id number]    Subjective:  Marcos is a 63 y.o. male who presents today for:    1. Left ear pain  Patient presenting today with acute onset of left ear pain and facial pain.  He states that he woke up with these symptoms this morning.  He states that the pain in his ear is caused when he blows his nose.  He has not been around any known sick contacts.  There has been no fevers, chills, or sweats.  No body aches.  He has had his flu shot this year.  He states that he does frequently wake up in the morning congested, but thought it may be from allergies.  There has been no drainage from the ear.    Past Medical History  Past Medical History:   Diagnosis Date    2:1 Second degree AV block 3/5/2013    Cardiac pacemaker 3/6/2013    Heart block 3/6/13    S/p DC PM    Hyperlipidemia     Personal history of colonic polyps 2013    Normal  ---Repeat colonoscopy in 10 years for screening purposes. (Previous polyps were both hyperplastic)       Past Surgical History  Past Surgical History:   Procedure Laterality Date    inguinal hernia repair      x 2    INSERT / REPLACE / REMOVE PACEMAKER  3/6/13    S/p DC PM       Family History  Family History   Problem Relation Age of Onset    Diabetes Mother     Cancer Mother     Cancer Father        Social History  Social History     Socioeconomic History    Marital status:      Spouse name: Not on file    Number of children: Not on file    Years of education: Not on file    Highest education level: Not on file   Occupational History    Not on file   Social Needs    Financial resource strain: Not on file    Food insecurity:     Worry: Not on file     Inability: Not on file    Transportation needs:     Medical: Not on file     Non-medical: Not on file   Tobacco Use    Smoking status: Former Smoker     Types: Cigars     Last attempt to quit: 1973     Years since quitting:  46.2    Smokeless tobacco: Never Used   Substance and Sexual Activity    Alcohol use: Yes     Alcohol/week: 10.0 standard drinks     Types: 5 Glasses of wine, 5 Cans of beer per week     Comment: Daily    Drug use: No    Sexual activity: Not on file   Lifestyle    Physical activity:     Days per week: Not on file     Minutes per session: Not on file    Stress: Not on file   Relationships    Social connections:     Talks on phone: Not on file     Gets together: Not on file     Attends [employer] service: Not on file     Active member of club or organization: Not on file     Attends meetings of clubs or organizations: Not on file     Relationship status: Not on file   Other Topics Concern    Not on file   Social History Narrative    Not on file       Current Medications  Current Outpatient Medications on File Prior to Visit   Medication Sig Dispense Refill    AFLURIA QUAD 2019-20,6MO UP, 60 mcg (15 mcg x 4)/0.5 mL Susp INTO MUSCLE  0    CALCIUM POLYCARBOPHIL (KONSYL FIBER ORAL) Take by mouth.      diazePAM (VALIUM) 5 MG tablet       glucosamine-chondroitin 500-400 mg tablet Take 1 tablet by mouth 3 (three) times daily.      multivitamin capsule Take 1 capsule by mouth once daily.      simvastatin (ZOCOR) 40 MG tablet Take 1 tablet (40 mg total) by mouth once daily. 90 tablet 3     No current facility-administered medications on file prior to visit.        Allergies   Review of patient's allergies indicates:  No Known Allergies    Review of Systems (Pertinent positives)  Review of Systems   Constitutional: Negative.    HENT: Positive for congestion, ear pain and sinus pain. Negative for ear discharge, hearing loss, nosebleeds, sore throat and tinnitus.    Eyes: Negative.    Respiratory: Negative.    Cardiovascular: Negative.    Genitourinary: Negative.    Musculoskeletal: Negative.    Skin: Negative.    Neurological: Negative.          BP (!) 140/79 (BP Location: Left arm, Patient Position: Sitting, BP  "Method: Medium (Automatic))   Pulse 61   Temp 98 °F (36.7 °C) (Oral)   Resp 17   Ht 6' 2.02" (1.88 m)   Wt 78.9 kg (173 lb 15.1 oz)   SpO2 98%   BMI 22.32 kg/m²     GENERAL APPEARANCE: in no apparent distress and well developed and well nourished  HEENT: PERRL, EOMI, Sclera clear, anicteric, Oropharynx clear, no lesions, Neck supple with midline trachea; bilateral middle ear effusion  NECK: normal, supple, no adenopathy, thyroid normal in size  RESPIRATORY: appears well, vitals normal, no respiratory distress, acyanotic, normal RR, chest clear, no wheezing, crepitations, rhonchi, normal symmetric air entry  HEART: regular rate and rhythm, S1, S2 normal, no murmur, click, rub or gallop.    ABDOMEN: abdomen is soft without tenderness, no masses, no hernias, no organomegaly, no rebound, no guarding. Suprapubic tenderness absent. No CVA tenderness.  SKIN: no rashes, no wounds, no other lesions  PSYCH: Alert, oriented x 3, thought content appropriate, speech normal, pleasant and cooperative, good eye contact, well groomed    Assessment/Plan:  Marcos Elaine is a 63 y.o. male who presents today for :    Marcos was seen today for otalgia.    Diagnoses and all orders for this visit:    Viral URI  -     Discontinue: fluticasone propionate (FLONASE) 50 mcg/actuation nasal spray; 1 spray (50 mcg total) by Each Nostril route once daily.  -     fluticasone propionate (FLONASE) 50 mcg/actuation nasal spray; 1 spray (50 mcg total) by Each Nostril route once daily.      1.  flonase for symptom relief  2.  No abx needed  3.  Call for worsening symptoms  4.  Follow up with PCP as scheduled      Dragan Asif MD    "

## 2019-10-11 NOTE — TELEPHONE ENCOUNTER
----- Message from Vadim Rowe MA sent at 10/11/2019  8:42 AM CDT -----  Contact: Patient      ----- Message -----  From: Sofi Lynn  Sent: 10/11/2019   8:14 AM CDT  To: Valentin Cheng    Alissa, The Pt is calling to report that he woke up this morning with a earache and he wants to know will this effect his procedure, and he wants to know if he can come in and get a shot. Please call him back @ [phone]. Thanks, Sofi

## 2019-10-11 NOTE — TELEPHONE ENCOUNTER
Spoke with patient. He went to his PCP today for his earache. No abx given. He is not running fever and is able to lie flat without any issues. Advised that we should be able to move forward with procedure 10/14/2019.

## 2019-10-14 ENCOUNTER — ANESTHESIA EVENT (OUTPATIENT)
Dept: MEDSURG UNIT | Facility: HOSPITAL | Age: 64
End: 2019-10-14
Payer: COMMERCIAL

## 2019-10-14 ENCOUNTER — ANESTHESIA (OUTPATIENT)
Dept: MEDSURG UNIT | Facility: HOSPITAL | Age: 64
End: 2019-10-14
Payer: COMMERCIAL

## 2019-10-14 ENCOUNTER — HOSPITAL ENCOUNTER (OUTPATIENT)
Facility: HOSPITAL | Age: 64
Discharge: HOME | End: 2019-10-15
Attending: INTERNAL MEDICINE | Admitting: INTERNAL MEDICINE
Payer: COMMERCIAL

## 2019-10-14 DIAGNOSIS — Z01.812 PRE-PROCEDURE LAB EXAM: ICD-10-CM

## 2019-10-14 DIAGNOSIS — I49.9 ARRHYTHMIA: ICD-10-CM

## 2019-10-14 DIAGNOSIS — Z95.0 PACEMAKER ECG PATTERN: ICD-10-CM

## 2019-10-14 DIAGNOSIS — I44.2 COMPLETE HEART BLOCK: ICD-10-CM

## 2019-10-14 DIAGNOSIS — Z95.0 PACEMAKER: ICD-10-CM

## 2019-10-14 DIAGNOSIS — I44.2 CHB (COMPLETE HEART BLOCK): ICD-10-CM

## 2019-10-14 DIAGNOSIS — I42.0 DILATED CARDIOMYOPATHY: ICD-10-CM

## 2019-10-14 PROCEDURE — C1894 INTRO/SHEATH, NON-LASER: HCPCS | Performed by: INTERNAL MEDICINE

## 2019-10-14 PROCEDURE — 25000003 PHARM REV CODE 250: Performed by: STUDENT IN AN ORGANIZED HEALTH CARE EDUCATION/TRAINING PROGRAM

## 2019-10-14 PROCEDURE — C1900 LEAD, CORONARY VENOUS: HCPCS | Performed by: INTERNAL MEDICINE

## 2019-10-14 PROCEDURE — 33225 PR INSRT PACING ELECT,AT INSERT NEW DEVICE: ICD-10-PCS | Mod: ,,, | Performed by: INTERNAL MEDICINE

## 2019-10-14 PROCEDURE — C1769 GUIDE WIRE: HCPCS | Performed by: INTERNAL MEDICINE

## 2019-10-14 PROCEDURE — 63600175 PHARM REV CODE 636 W HCPCS: Performed by: STUDENT IN AN ORGANIZED HEALTH CARE EDUCATION/TRAINING PROGRAM

## 2019-10-14 PROCEDURE — 63600175 PHARM REV CODE 636 W HCPCS: Performed by: ANESTHESIOLOGY

## 2019-10-14 PROCEDURE — C2621 PMKR, OTHER THAN SING/DUAL: HCPCS | Performed by: INTERNAL MEDICINE

## 2019-10-14 PROCEDURE — D9220A PRA ANESTHESIA: ICD-10-PCS | Mod: ,,, | Performed by: ANESTHESIOLOGY

## 2019-10-14 PROCEDURE — 33229 REMV&REPLC PM GEN MULT LEADS: CPT | Performed by: INTERNAL MEDICINE

## 2019-10-14 PROCEDURE — 93010 ELECTROCARDIOGRAM REPORT: CPT | Mod: 76,,, | Performed by: INTERNAL MEDICINE

## 2019-10-14 PROCEDURE — 63600175 PHARM REV CODE 636 W HCPCS: Performed by: NURSE ANESTHETIST, CERTIFIED REGISTERED

## 2019-10-14 PROCEDURE — 63600175 PHARM REV CODE 636 W HCPCS: Performed by: NURSE PRACTITIONER

## 2019-10-14 PROCEDURE — 33225 L VENTRIC PACING LEAD ADD-ON: CPT | Performed by: INTERNAL MEDICINE

## 2019-10-14 PROCEDURE — 93010 ELECTROCARDIOGRAM REPORT: CPT | Mod: ,,, | Performed by: INTERNAL MEDICINE

## 2019-10-14 PROCEDURE — 93005 ELECTROCARDIOGRAM TRACING: CPT

## 2019-10-14 PROCEDURE — C1725 CATH, TRANSLUMIN NON-LASER: HCPCS | Performed by: INTERNAL MEDICINE

## 2019-10-14 PROCEDURE — 37000008 HC ANESTHESIA 1ST 15 MINUTES: Performed by: INTERNAL MEDICINE

## 2019-10-14 PROCEDURE — 33229 PR REMV&REPLC PM GEN MULT LEADS: ICD-10-PCS | Mod: ,,, | Performed by: INTERNAL MEDICINE

## 2019-10-14 PROCEDURE — 37000009 HC ANESTHESIA EA ADD 15 MINS: Performed by: INTERNAL MEDICINE

## 2019-10-14 PROCEDURE — C1893 INTRO/SHEATH, FIXED,NON-PEEL: HCPCS | Performed by: INTERNAL MEDICINE

## 2019-10-14 PROCEDURE — 33229 REMV&REPLC PM GEN MULT LEADS: CPT | Mod: ,,, | Performed by: INTERNAL MEDICINE

## 2019-10-14 PROCEDURE — 25000003 PHARM REV CODE 250: Performed by: INTERNAL MEDICINE

## 2019-10-14 PROCEDURE — 93010 EKG 12-LEAD: ICD-10-PCS | Mod: 76,,, | Performed by: INTERNAL MEDICINE

## 2019-10-14 PROCEDURE — 25000003 PHARM REV CODE 250: Performed by: NURSE ANESTHETIST, CERTIFIED REGISTERED

## 2019-10-14 PROCEDURE — D9220A PRA ANESTHESIA: Mod: ,,, | Performed by: ANESTHESIOLOGY

## 2019-10-14 PROCEDURE — 33225 L VENTRIC PACING LEAD ADD-ON: CPT | Mod: ,,, | Performed by: INTERNAL MEDICINE

## 2019-10-14 DEVICE — CARDIAC RESYNCHRONIZATION THERAPY DEVICE, PULSE GENERATOR DDDRV
Type: IMPLANTABLE DEVICE | Site: HEART | Status: FUNCTIONAL
Brand: QUADRA ALLURE MP™

## 2019-10-14 DEVICE — LEFT HEART LEAD
Type: IMPLANTABLE DEVICE | Site: HEART | Status: FUNCTIONAL
Brand: QUARTET™

## 2019-10-14 RX ORDER — TRAMADOL HYDROCHLORIDE 50 MG/1
50 TABLET ORAL EVERY 6 HOURS PRN
Status: DISCONTINUED | OUTPATIENT
Start: 2019-10-14 | End: 2019-10-15 | Stop reason: HOSPADM

## 2019-10-14 RX ORDER — SODIUM CHLORIDE 9 MG/ML
INJECTION, SOLUTION INTRAVENOUS CONTINUOUS PRN
Status: DISCONTINUED | OUTPATIENT
Start: 2019-10-14 | End: 2019-10-14

## 2019-10-14 RX ORDER — BUPIVACAINE HYDROCHLORIDE 2.5 MG/ML
INJECTION, SOLUTION EPIDURAL; INFILTRATION; INTRACAUDAL
Status: DISCONTINUED | OUTPATIENT
Start: 2019-10-14 | End: 2019-10-15 | Stop reason: HOSPADM

## 2019-10-14 RX ORDER — SODIUM CHLORIDE 9 MG/ML
INJECTION, SOLUTION INTRAVENOUS CONTINUOUS
Status: ACTIVE | OUTPATIENT
Start: 2019-10-14

## 2019-10-14 RX ORDER — SIMVASTATIN 40 MG/1
40 TABLET, FILM COATED ORAL NIGHTLY
Status: DISCONTINUED | OUTPATIENT
Start: 2019-10-14 | End: 2019-10-15 | Stop reason: HOSPADM

## 2019-10-14 RX ORDER — PHENYLEPHRINE HYDROCHLORIDE 10 MG/ML
INJECTION INTRAVENOUS
Status: DISCONTINUED | OUTPATIENT
Start: 2019-10-14 | End: 2019-10-14

## 2019-10-14 RX ORDER — ONDANSETRON 2 MG/ML
4 INJECTION INTRAMUSCULAR; INTRAVENOUS DAILY PRN
Status: DISCONTINUED | OUTPATIENT
Start: 2019-10-14 | End: 2019-10-15 | Stop reason: HOSPADM

## 2019-10-14 RX ORDER — CEPHALEXIN 500 MG/1
500 CAPSULE ORAL EVERY 8 HOURS
Qty: 15 CAPSULE | Refills: 0 | Status: SHIPPED | OUTPATIENT
Start: 2019-10-15 | End: 2019-10-20

## 2019-10-14 RX ORDER — SIMVASTATIN 40 MG/1
40 TABLET, FILM COATED ORAL DAILY
Status: DISCONTINUED | OUTPATIENT
Start: 2019-10-14 | End: 2019-10-14

## 2019-10-14 RX ORDER — CEFAZOLIN SODIUM 1 G/3ML
2 INJECTION, POWDER, FOR SOLUTION INTRAMUSCULAR; INTRAVENOUS
Status: COMPLETED | OUTPATIENT
Start: 2019-10-14 | End: 2019-10-15

## 2019-10-14 RX ORDER — ACETAMINOPHEN 325 MG/1
650 TABLET ORAL EVERY 4 HOURS PRN
Status: DISCONTINUED | OUTPATIENT
Start: 2019-10-14 | End: 2019-10-15 | Stop reason: HOSPADM

## 2019-10-14 RX ORDER — MIDAZOLAM HYDROCHLORIDE 1 MG/ML
INJECTION, SOLUTION INTRAMUSCULAR; INTRAVENOUS
Status: DISCONTINUED | OUTPATIENT
Start: 2019-10-14 | End: 2019-10-14

## 2019-10-14 RX ORDER — LIDOCAINE HYDROCHLORIDE 20 MG/ML
INJECTION, SOLUTION INFILTRATION; PERINEURAL
Status: DISCONTINUED | OUTPATIENT
Start: 2019-10-14 | End: 2019-10-15 | Stop reason: HOSPADM

## 2019-10-14 RX ORDER — CEFAZOLIN SODIUM 1 G/3ML
2 INJECTION, POWDER, FOR SOLUTION INTRAMUSCULAR; INTRAVENOUS
Status: COMPLETED | OUTPATIENT
Start: 2019-10-14 | End: 2019-10-14

## 2019-10-14 RX ORDER — KETAMINE HCL IN 0.9 % NACL 50 MG/5 ML
SYRINGE (ML) INTRAVENOUS
Status: DISCONTINUED | OUTPATIENT
Start: 2019-10-14 | End: 2019-10-14

## 2019-10-14 RX ORDER — CEPHALEXIN 500 MG/1
500 CAPSULE ORAL EVERY 8 HOURS
Status: DISCONTINUED | OUTPATIENT
Start: 2019-10-15 | End: 2019-10-15 | Stop reason: HOSPADM

## 2019-10-14 RX ORDER — FENTANYL CITRATE 50 UG/ML
25 INJECTION, SOLUTION INTRAMUSCULAR; INTRAVENOUS EVERY 5 MIN PRN
Status: DISCONTINUED | OUTPATIENT
Start: 2019-10-14 | End: 2019-10-14

## 2019-10-14 RX ORDER — PROPOFOL 10 MG/ML
VIAL (ML) INTRAVENOUS CONTINUOUS PRN
Status: DISCONTINUED | OUTPATIENT
Start: 2019-10-14 | End: 2019-10-14

## 2019-10-14 RX ADMIN — Medication 25 MG: at 11:10

## 2019-10-14 RX ADMIN — SIMVASTATIN 40 MG: 40 TABLET, FILM COATED ORAL at 08:10

## 2019-10-14 RX ADMIN — CEFAZOLIN 2 G: 330 INJECTION, POWDER, FOR SOLUTION INTRAMUSCULAR; INTRAVENOUS at 12:10

## 2019-10-14 RX ADMIN — PROPOFOL 75 MCG/KG/MIN: 10 INJECTION, EMULSION INTRAVENOUS at 11:10

## 2019-10-14 RX ADMIN — MIDAZOLAM HYDROCHLORIDE 2 MG: 1 INJECTION, SOLUTION INTRAMUSCULAR; INTRAVENOUS at 11:10

## 2019-10-14 RX ADMIN — ACETAMINOPHEN 650 MG: 325 TABLET ORAL at 01:10

## 2019-10-14 RX ADMIN — FENTANYL CITRATE 25 MCG: 50 INJECTION INTRAMUSCULAR; INTRAVENOUS at 02:10

## 2019-10-14 RX ADMIN — Medication 25 MG: at 12:10

## 2019-10-14 RX ADMIN — SODIUM CHLORIDE 1000 ML: 0.9 INJECTION, SOLUTION INTRAVENOUS at 10:10

## 2019-10-14 RX ADMIN — PHENYLEPHRINE HYDROCHLORIDE 100 MCG: 10 INJECTION INTRAVENOUS at 12:10

## 2019-10-14 RX ADMIN — CEFAZOLIN 2 G: 1 INJECTION, POWDER, FOR SOLUTION INTRAMUSCULAR; INTRAVENOUS at 08:10

## 2019-10-14 RX ADMIN — SODIUM CHLORIDE: 9 INJECTION, SOLUTION INTRAVENOUS at 11:10

## 2019-10-14 NOTE — ANESTHESIA PREPROCEDURE EVALUATION
10/14/2019  Marcos Elaine is a 63 y.o., male with PPM here for device upgarde.    Pre-operative evaluation for Procedure(s) (LRB):  Biventricular pacemaker upgrade (N/A)        Patient Active Problem List   Diagnosis    CHB (complete heart block)    Hyperlipidemia    Cardiac pacemaker    Special screening for malignant neoplasms, colon    Personal history of colonic polyps    History of diverticulitis    Gastroesophageal reflux disease       Review of patient's allergies indicates:  No Known Allergies    No current facility-administered medications on file prior to encounter.      Current Outpatient Medications on File Prior to Encounter   Medication Sig Dispense Refill    CALCIUM POLYCARBOPHIL (KONSYL FIBER ORAL) Take by mouth.      glucosamine-chondroitin 500-400 mg tablet Take 1 tablet by mouth 3 (three) times daily.      multivitamin capsule Take 1 capsule by mouth once daily.      simvastatin (ZOCOR) 40 MG tablet Take 1 tablet (40 mg total) by mouth once daily. 90 tablet 3       Past Surgical History:   Procedure Laterality Date    HERNIA REPAIR      inguinal hernia repair      x 2    INSERT / REPLACE / REMOVE PACEMAKER  3/6/13    S/p DC PM    SHOULDER SURGERY Left        Social History     Socioeconomic History    Marital status:      Spouse name: Not on file    Number of children: Not on file    Years of education: Not on file    Highest education level: Not on file   Occupational History    Not on file   Social Needs    Financial resource strain: Not on file    Food insecurity:     Worry: Not on file     Inability: Not on file    Transportation needs:     Medical: Not on file     Non-medical: Not on file   Tobacco Use    Smoking status: Former Smoker     Types: Cigars     Last attempt to quit: 1973     Years since quittin.2    Smokeless tobacco: Never Used    Substance and Sexual Activity    Alcohol use: Yes     Alcohol/week: 7.0 standard drinks     Types: 5 Glasses of wine, 2 Cans of beer per week     Comment: Daily    Drug use: No    Sexual activity: Not on file   Lifestyle    Physical activity:     Days per week: Not on file     Minutes per session: Not on file    Stress: Not on file   Relationships    Social connections:     Talks on phone: Not on file     Gets together: Not on file     Attends Judaism service: Not on file     Active member of club or organization: Not on file     Attends meetings of clubs or organizations: Not on file     Relationship status: Not on file   Other Topics Concern    Not on file   Social History Narrative    Not on file         CBC: No results for input(s): WBC, RBC, HGB, HCT, PLT, MCV, MCH, MCHC in the last 72 hours.    CMP: No results for input(s): NA, K, CL, CO2, BUN, CREATININE, GLU, MG, PHOS, CALCIUM, ALBUMIN, PROT, ALKPHOS, ALT, AST, BILITOT in the last 72 hours.    INR  No results for input(s): PT, INR, PROTIME, APTT in the last 72 hours.              2D Echo:  Results for orders placed or performed during the hospital encounter of 08/29/18   2D echo with color flow doppler   Result Value Ref Range    QEF 50 55 - 65    Mitral Valve Regurgitation MILD     Aortic Valve Regurgitation TRIVIAL     Tricuspid Valve Regurgitation TRIVIAL          Anesthesia Evaluation    I have reviewed the Patient Summary Reports.    I have reviewed the Nursing Notes.   I have reviewed the Medications.     Review of Systems  Anesthesia Hx:  No problems with previous Anesthesia    Hematology/Oncology:  Hematology Normal   Oncology Normal     EENT/Dental:EENT/Dental Normal   Cardiovascular:   Dysrhythmias atrial fibrillation    Pulmonary:  Pulmonary Normal    Renal/:  Renal/ Normal     Hepatic/GI:   GERD    Musculoskeletal:  Musculoskeletal Normal    Neurological:  Neurology Normal    Endocrine:  Endocrine Normal    Dermatological:  Skin  Normal    Psych:  Psychiatric Normal           Physical Exam  General:  Well nourished    Airway/Jaw/Neck:  Airway Findings: Mouth Opening: Normal Tongue: Normal  General Airway Assessment: Adult  Mallampati: II  Jaw/Neck Findings:  Neck ROM: Normal ROM     Eyes/Ears/Nose:  Eyes/Ears/Nose Findings:    Dental:  Dental Findings: In tact   Chest/Lungs:  Chest/Lungs Findings: Clear to auscultation, Normal Respiratory Rate     Heart/Vascular:  Heart Findings: Rate: Normal  Rhythm: Regular Rhythm  Sounds: Normal  Heart Murmur  Vascular Findings:        Mental Status:  Mental Status Findings:  Cooperative, Alert and Oriented         Anesthesia Plan  Type of Anesthesia, risks & benefits discussed:  Anesthesia Type:  general, MAC  Patient's Preference: General/MAC  Intra-op Monitoring Plan: standard ASA monitors  Intra-op Monitoring Plan Comments:   Post Op Pain Control Plan:   Post Op Pain Control Plan Comments: IV meds as needed  Induction:   IV  Beta Blocker:  Patient is not currently on a Beta-Blocker (No further documentation required).       Informed Consent: Patient understands risks and agrees with Anesthesia plan.  Questions answered. Anesthesia consent signed with patient.  ASA Score: 3     Day of Surgery Review of History & Physical:    H&P update referred to the provider.     Anesthesia Plan Notes: Discussed anesthetic options, pt understands and agrees with plan        Ready For Surgery From Anesthesia Perspective.

## 2019-10-14 NOTE — PROCEDURES
: Jarod Hanson MD    Post-operative Diagnosis: HF / RV pacing induced CM    Procedure Performed: CRT-P upgrade    Description of Procedure: The patient was brought to the EP lab in the fasting state. Prepped and draped in sterile fashion. Safety timeout was performed. Sedation administered by anesthesia staff. Selective venogram of the left axillary and cephalic veins performed via left ac IV. Lidocaine used for local anesthetic. Fluoroscopic guided axillary access utilized. Guide/J Wire advanced and confirmed in IVC. Incision made. Blunt and electrocautery dissection performed. Pocket made and washed with antibiotic solution. CS catheter advanced over guide wire. Selective venogram of the CS performed to evaluate anatomy to define optimal positioning. CS lead advanced into place. Testing performed including testing for diaphragmatic stimulation. Pocket washed using antibiotic solution. Leads connected to generator. Generator placed into pocket, sutured in place, and washed with antibiotic solution. Deep layer closed with interrupted 3-0 suture. Intermediate layer closed with running 3-0 suture. Superficial layer closed with running 4-0 suture. Skin closed with Dermabond. Meplex dressing to be placed after dermabond has dried.     EBL: <10 mL    Specimens Removed: None  Complications: no immediate    1. Ancef 2 grams q8 hours x 2 doses (ordered)  2. Sling to left arm - wear for 48 hours, then only at night for 6 weeks.  3. NO HEPARIN PRODUCTS  4. Keflex 500 mg TID for 5 days at discharge (or after the 2 doses of IV antibiotics if still in the hospital)  5. No lifting left elbow above shoulder height  6. No lifting over 5 pounds  7. No driving for 1 week and for 4 weeks if patient uses left arm to make turns  8. Dressing will be removed in AM by EP  9. Chest Xray (ordered)  10. Follow up in device clinic in 1 week for device and wound checks.     Dr Hanson, the attending electrophysiologist, was  present for the entirety of this procedure.    Anna Mirza MD   Cardiology Fellow, PGY-5  #[phone]

## 2019-10-14 NOTE — DISCHARGE SUMMARY
[facility]  Cardiac Electrophysiology  Discharge Summary      Patient Name: Marcos Elaine  MRN: [id number]  Admission Date: 10/14/2019  Hospital Length of Stay: 0 days  Discharge Date and Time:  10/15/2019  7:00 AM  Attending Physician: Jarod Hanson MD    Discharging Provider: Anna Mirza MD  Primary Care Physician: Hiren Gonzalez MD    HPI: 64 yo M presents for CRT-P upgrade for HF and RV pacing induced CM.      Procedure(s) (LRB):  Biventricular pacemaker upgrade (N/A)     Indwelling Lines/Drains at time of discharge:  Lines/Drains/Airways     None                 Hospital Course: Marcos Elaine is a 64 yo M with CHB s/p dc PPM and resultant HF 2/2 RV pacing induced CM who presented on 10/14/19 for CRT-P upgrade with Dr Hanson. Device upgraded successfully under fluoroscopic guidance. Post procedurally developed phrenic stimulation while seated in upright position - therefore device was adjusted. Patient monitored overnight. Had recurrent phrenic stimulation with laying down - therefore device readjusted again with resolution in symptoms. Position of leads confirmed by CXR. He was given prophylactic IV abx then transitioned to PO x 5 days.  Deemed stable for discharge home on 10/15/19 with close follow up arranged with device clinic in 1 week and EP clinic in 3 months.      Significant Diagnostic Studies: Labs:   BMP: No results for input(s): GLU, NA, K, CL, CO2, BUN, CREATININE, CALCIUM, MG in the last 48 hours., CBC No results for input(s): WBC, HGB, HCT, PLT in the last 48 hours. and INR   Lab Results   Component Value Date    INR 0.9 10/07/2019    INR 1.0 03/05/2013       Pending Diagnostic Studies:     Procedure Component Value Units Date/Time    EKG 12-lead [[id number]]     Order Status:  Sent Lab Status:  No result     EKG 12-lead [[id number]]     Order Status:  Sent Lab Status:  No result           Final Active Diagnoses:    Diagnosis Date Noted POA    PRINCIPAL  PROBLEM:  CHB (complete heart block) [I44.2] 03/04/2013 Yes      Problems Resolved During this Admission:     No new Assessment & Plan notes have been filed under this hospital service since the last note was generated.  Service: Arrhythmia      Discharged Condition: good    Disposition: home    Follow Up:  Device clinic 1 week  EP clinic (Dr Hanson) 3 months      Medications:  Reconciled Home Medications:      Medication List      START taking these medications    cephALEXin 500 MG capsule  Commonly known as:  KEFLEX  Take 1 capsule (500 mg total) by mouth every 8 (eight) hours. for 5 days        CONTINUE taking these medications    AFLURIA QUAD 2019-20(6MO UP) 60 mcg (15 mcg x 4)/0.5 mL Susp  Generic drug:  flu vacc quad 2019-20(6mos up)  INTO MUSCLE     diazePAM 5 MG tablet  Commonly known as:  VALIUM     fluticasone propionate 50 mcg/actuation nasal spray  Commonly known as:  FLONASE  1 spray (50 mcg total) by Each Nostril route once daily.     glucosamine-chondroitin 500-400 mg tablet  Take 1 tablet by mouth 3 (three) times daily.     KONSYL FIBER ORAL  Take by mouth.     multivitamin capsule  Take 1 capsule by mouth once daily.     simvastatin 40 MG tablet  Commonly known as:  ZOCOR  Take 1 tablet (40 mg total) by mouth once daily.            Time spent on the discharge of patient: 35 minutes    Anna Mirza MD  Cardiac Electrophysiology  [facility]

## 2019-10-14 NOTE — Clinical Note
A venogram was performed in the left axillary vein. The vessel was injected with hand injection  with 10 mL of contrast.

## 2019-10-14 NOTE — Clinical Note
A venogram was performed in the coronary sinus. The vessel was injected with hand injection  with 5 mL of contrast.

## 2019-10-14 NOTE — PROGRESS NOTES
"Chest xray done per md order. Dr stark at bedside.  Pt with complaints of "heart feels like it is beating fast."  Hr on cm 67.  St soheila rep called by dr stark. Awaiting to bedside. Pt [id number]. Follows commands.     "

## 2019-10-14 NOTE — H&P
[facility]  Cardiac Electrophysiology  History and Physical     Admission Date: 10/14/2019  Code Status: Prior   Attending Provider: Jarod Hanson MD   Principal Problem:CHB (complete heart block)    Subjective:     Per Dr Hanson, 9/6/2019:    HPI: Mr. Elaine is a 63 y.o. male with AVB, PPM (2018) here for annual follow up.      Background:     History of 2:1 AVB s/p DC PM 3/6/83148 here for long term follow up.   At his last clinic appt 8/2017: 24% RV pacing.   [place] well at clinic follow up 8/2018. CHB and 94% RV pacing. Echo showed EF 50-55%.     Update (09/03/2019):     Today he says he continues to feel well. No cardiac complaints. Mr. Elaine denies chest pain with exertion or at rest, palpitations, SOB, PAUL, dizziness, or syncope.     Device Interrogation (9/3/2019) reveals an intrinsic SR with CHB with stable lead and device function. No arrhythmias or treated episodes were noted.  He paces 99% in the RV. Estimated battery longevity 9 years.      I have personally reviewed the patient's EKG today, which shows ASVP at 64bpm. NC interval is 184. QRS is 176. QTc is 499.     Recent Cardiac Tests:     2D Echo (8/29/2018):  CONCLUSIONS     1 - Low normal to mildly depressed left ventricular systolic function (EF 50-55%).     2 - Normal right ventricular systolic function .     3 - Trivial aortic regurgitation.     4 - Mild mitral regurgitation.     5 - Trivial tricuspid regurgitation.     6 - Mild left atrial enlargement.           Current Outpatient Medications   Medication Sig    CALCIUM POLYCARBOPHIL (KONSYL FIBER ORAL) Take by mouth.    glucosamine-chondroitin 500-400 mg tablet Take 1 tablet by mouth 3 (three) times daily.    multivitamin capsule Take 1 capsule by mouth once daily.    simvastatin (ZOCOR) 40 MG tablet Take 1 tablet (40 mg total) by mouth once daily.      No current facility-administered medications for this visit.          Review of Systems   Constitution:  "Negative for malaise/fatigue.   Cardiovascular: Negative for chest pain, dyspnea on exertion, irregular heartbeat, leg swelling and palpitations.   Respiratory: Negative for shortness of breath.    Hematologic/Lymphatic: Negative for bleeding problem.   Skin: Negative for rash.   Musculoskeletal: Negative for myalgias.   Gastrointestinal: Negative for hematemesis, hematochezia and nausea.   Genitourinary: Negative for hematuria.   Neurological: Negative for light-headedness.   Psychiatric/Behavioral: Negative for altered mental status.   Allergic/Immunologic: Negative for persistent infections.            Objective:         BP (!) 170/82   Pulse 61   Ht 6' 2" (1.88 m)   Wt 80.1 kg (176 lb 9.4 oz)   BMI 22.67 kg/m²      Physical Exam   Constitutional: He is oriented to person, place, and time. He appears well-developed and well-nourished.   HENT:   Head: Normocephalic.   Nose: Nose normal.   Eyes: Pupils are equal, round, and reactive to light.   Cardiovascular: Normal rate, regular rhythm, S1 normal and S2 normal.   No murmur heard.  Pulses:       Radial pulses are 2+ on the right side, and 2+ on the left side.   Pulmonary/Chest: Breath sounds normal. No respiratory distress.   Device to LUCW.   Abdominal: Normal appearance.   Musculoskeletal: Normal range of motion. He exhibits no edema.   Neurological: He is alert and oriented to person, place, and time.   Skin: Skin is warm and dry. No erythema.   Psychiatric: He has a normal mood and affect. His speech is normal and behavior is normal.   Nursing note and vitals reviewed.           Lab Results   Component Value Date      04/01/2019     K 4.7 04/01/2019     MG 2.5 03/05/2013     BUN 10 04/01/2019     CREATININE 0.9 04/01/2019     ALT 23 04/01/2019     AST 23 04/01/2019     HGB 15.7 04/01/2019     HCT 47.7 04/01/2019     TSH 1.188 04/01/2019     LDLCALC 99.0 04/01/2019            Assessment:      1. Cardiac pacemaker    2. CHB (complete heart block)  "         Plan:      In summary, Mr. Elaine is a 63 y.o. male with AVB, PPM (2018) here for annual follow up.   Mr. Elaine is doing well from a device perspective with stable lead and device function. No arrhythmia noted. CHB with 99% RV pacing. No CHF symptoms. Will obtain updated echo. Hypertensive in clinic today which is unusual for him. He will collect 2 weeks home BP log.     Echo. (UPDATE: Echo shows EF 45%; Plan will be to upgrade to CRT-P).  BP log.  Let clinic know if top number is above 130 on average.   Continue current medication regimen and device settings.   Follow up in device clinic as scheduled.   Follow up in EP clinic in 1 year, sooner as needed.      =============================    We discussed the alternatives, benefits and risks of the procedure including pain, infection, bleeding, injury to lung causing pneumothorax requiring tube placement, injury to heart valves, puncture of the heart leading to pericardial effusion or tamponade requiring tube drainage, heart attack, stroke and death.        Anna Mirza MD  Cardiac Electrophysiology  [facility]

## 2019-10-14 NOTE — DISCHARGE INSTRUCTIONS
Pacemaker Implantation Instructions    How should I take care of my incision?  -Keep your incision clean and dry for 5 days after your procedure. Take sponge baths working around the incision during this time.   -May shower 5 days after the procedure, but avoid direct water contact to the incision (allow the water to hit back of your shoulder rather than directly on the incision). It is okay if clean shower water gets on the incision. Pat dry gently after your shower.   -Do not submerge incision in tub, pool, hot tub or lake for 4 weeks.   -Unless your incision is bleeding or draining, keep incision open to air. If there is bleeding or drainage please call.   -Avoid using deodorants, powders, creams, lotions, etc. on your incision for 4 weeks.   -There are no stitches to be removed. Steri-strips (strips of tape) will begin to fall off in 10-14 days. If they remain after 2 weeks, gently remove them when they are damp after your shower.   -Your incision should look better each day. If you notice unusual swelling, redness, drainage, have more pain at the site, or have a fever greater than 100 degrees, call us immediately.     Two Most Important Rules After Discharge   # 1 Keep incision CLEAN and DRY for 5 days (sponge baths only).   # 2 Do not raise your elbow on the side of the implant higher than your shoulder for 6 weeks.    What are my activity restrictions?   -Do not raise your elbow higher than your shoulder on the affected side for the first 6 weeks.   -If you were driving prior to your procedure, you can usually resume driving in 7-10 days. If you have a history of passing out, there may be restrictions unrelated to your procedure.   -If you are an active sleeper, you should use your sling at night for the first week.   -No heavy activity with the affected arm for 6 weeks, ex. tennis, golfing, swimming, bowling, and shoveling.   -No heavy lifting (greater than 10 pounds) for one month with affected arm.    -Avoid any activity/exercise which involves rough contact with device site or might cause a heavy blow to the skin over the device. Follow up with your doctor prior to any activity that would be considered as rough.   -Do not mow the lawn (push or riding ) for 6 weeks (too much dust and dirt).   -You should be able to resume your normal routine after the first week, but continue to follow activity limits listed above (ex. no vacuuming with affected arm). Avoid extreme fatigue.   -Avoid placing all your weight on your arm until the incision is fully healed (6 weeks).   -You may return to work within 3-5 days unless told otherwise. This will vary with your job, age, and overall physical condition.

## 2019-10-14 NOTE — PROGRESS NOTES
"Dr stark at bedside with shaun lindsey soheila rep.  Pt states "I don't feel like my heart is beating so fast or the urge to hiccup."  Pacer remains at ddd low limit 60.      "

## 2019-10-14 NOTE — Clinical Note
The site was marked. Prepped: chest. Prepped with: ChloraPrep and Ioban. The site was clipped. The patient was draped.

## 2019-10-14 NOTE — PLAN OF CARE
Pt arrived to unit accompanied by spouse.  Vss.  Oriented pt to rm and unit.  Pre op orders and assessment initiated.  Pt remains npo.  Pt in no acute distress and verbalizes no complains.  Will continue to monitor.  Call bell within reach.

## 2019-10-14 NOTE — HPI
Patient ID:  Marcos Elaine is a 63 y.o. male who presents for follow-up of Pacemaker Check  .      HPI:     Mr. Elaine is a 63 y.o. male with AVB, PPM (2018) here for annual follow up.      Background:     History of 2:1 AVB s/p DC PM 3/6/64818 here for long term follow up.   At his last clinic appt 8/2017: 24% RV pacing.   [place] well at clinic follow up 8/2018. CHB and 94% RV pacing. Echo showed EF 50-55%.     Update (09/03/2019):     Today he says he continues to feel well. No cardiac complaints. Mr. Elaine denies chest pain with exertion or at rest, palpitations, SOB, PAUL, dizziness, or syncope.     Device Interrogation (9/3/2019) reveals an intrinsic SR with CHB with stable lead and device function. No arrhythmias or treated episodes were noted.  He paces 99% in the RV. Estimated battery longevity 9 years.      I have personally reviewed the patient's EKG today, which shows ASVP at 64bpm. RI interval is 184. QRS is 176. QTc is 499.     Recent Cardiac Tests:     2D Echo (8/29/2018):  CONCLUSIONS     1 - Low normal to mildly depressed left ventricular systolic function (EF 50-55%).     2 - Normal right ventricular systolic function .     3 - Trivial aortic regurgitation.     4 - Mild mitral regurgitation.     5 - Trivial tricuspid regurgitation.     6 - Mild left atrial enlargement.           Current Outpatient Medications   Medication Sig    CALCIUM POLYCARBOPHIL (KONSYL FIBER ORAL) Take by mouth.    glucosamine-chondroitin 500-400 mg tablet Take 1 tablet by mouth 3 (three) times daily.    multivitamin capsule Take 1 capsule by mouth once daily.    simvastatin (ZOCOR) 40 MG tablet Take 1 tablet (40 mg total) by mouth once daily.      No current facility-administered medications for this visit.          Review of Systems   Constitution: Negative for malaise/fatigue.   Cardiovascular: Negative for chest pain, dyspnea on exertion, irregular heartbeat, leg swelling and palpitations.   Respiratory: Negative  "for shortness of breath.    Hematologic/Lymphatic: Negative for bleeding problem.   Skin: Negative for rash.   Musculoskeletal: Negative for myalgias.   Gastrointestinal: Negative for hematemesis, hematochezia and nausea.   Genitourinary: Negative for hematuria.   Neurological: Negative for light-headedness.   Psychiatric/Behavioral: Negative for altered mental status.   Allergic/Immunologic: Negative for persistent infections.            Objective:         BP (!) 170/82   Pulse 61   Ht 6' 2" (1.88 m)   Wt 80.1 kg (176 lb 9.4 oz)   BMI 22.67 kg/m²      Physical Exam   Constitutional: He is oriented to person, place, and time. He appears well-developed and well-nourished.   HENT:   Head: Normocephalic.   Nose: Nose normal.   Eyes: Pupils are equal, round, and reactive to light.   Cardiovascular: Normal rate, regular rhythm, S1 normal and S2 normal.   No murmur heard.  Pulses:       Radial pulses are 2+ on the right side, and 2+ on the left side.   Pulmonary/Chest: Breath sounds normal. No respiratory distress.   Device to LUCW.   Abdominal: Normal appearance.   Musculoskeletal: Normal range of motion. He exhibits no edema.   Neurological: He is alert and oriented to person, place, and time.   Skin: Skin is warm and dry. No erythema.   Psychiatric: He has a normal mood and affect. His speech is normal and behavior is normal.   Nursing note and vitals reviewed.           Lab Results   Component Value Date      04/01/2019     K 4.7 04/01/2019     MG 2.5 03/05/2013     BUN 10 04/01/2019     CREATININE 0.9 04/01/2019     ALT 23 04/01/2019     AST 23 04/01/2019     HGB 15.7 04/01/2019     HCT 47.7 04/01/2019     TSH 1.188 04/01/2019     LDLCALC 99.0 04/01/2019            Assessment:      1. Cardiac pacemaker    2. CHB (complete heart block)          Plan:      In summary, Mr. Elaine is a 63 y.o. male with AVB, PPM (2018) here for annual follow up.   Mr. Elaine is doing well from a device perspective with stable " lead and device function. No arrhythmia noted. CHB with 99% RV pacing. No CHF symptoms. Will obtain updated echo. Hypertensive in clinic today which is unusual for him. He will collect 2 weeks home BP log.     Echo. (UPDATE: Echo shows EF 45%; Plan will be to upgrade to CRT-P).  BP log.  Let clinic know if top number is above 130 on average.   Continue current medication regimen and device settings.   Follow up in device clinic as scheduled.   Follow up in EP clinic in 1 year, sooner as needed.

## 2019-10-14 NOTE — Clinical Note
Existing generator: ANDREW Tilley DR, RF [id number]  SN:[id number]  Implanted:3-6-13 explanted due to upgrade and returned to

## 2019-10-14 NOTE — NURSING TRANSFER
Nursing Transfer Note      10/14/2019     Transfer To: ep pacu 3 to sscu 316    Transfer via stretcher    Transfer with cardiac monitoring, tele box on sscu 316    Transported by saleem durant pacu rn    Medicines sent: none    Chart send with patient: Yes    Notified: spouse    Patient reassessed at: 10/14/19 1445    Upon arrival to floor: cardiac monitor applied, patient oriented to room, call bell in reach and bed in lowest position

## 2019-10-14 NOTE — TRANSFER OF CARE
"Anesthesia Transfer of Care Note    Patient: Marcos Elaine    Procedure(s) Performed: Procedure(s) (LRB):  Biventricular pacemaker upgrade (N/A)    Patient location: PACU    Anesthesia Type: MAC    Transport from OR: Transported from OR on 6-10 L/min O2 by face mask with adequate spontaneous ventilation    Post pain: adequate analgesia    Post assessment: no apparent anesthetic complications and tolerated procedure well    Post vital signs: stable    Level of consciousness: awake and alert    Complications: none    Transfer of care protocol was followed      Last vitals:   Visit Vitals  /78 (BP Location: Right arm, Patient Position: Lying)   Pulse 66   Temp 36 °C (96.8 °F) (Oral)   Resp 18   Ht 6' 2" (1.88 m)   Wt 78.5 kg (173 lb)   SpO2 97%   BMI 22.21 kg/m²     "

## 2019-10-14 NOTE — PLAN OF CARE
"Vss.  Pt on cm.  Paced rhythm noted.  A paced.  At times, av paced rhythm noted.  Left chest wall incision with dermabond, well approx no drainage.  Once dermabond dried, foam drsg placed. Ice pack and sling on.  Chest xray and 12 lead ekg done per md order.  Pt denies any complaints of "hiccups or heart beat racing."  Hob 30 degrees.  Pt with "discomfort to chest incision."  Prn po meds and iv pain meds given per md order.  At this time, pt states "tolerable on pain scale 1-10."  "3 on pain scale."  Pt tolerating sips of water in ep pacu 3.  Pt on sscu 316 tele box. Awaiting sscu rn to call for report.  Pt's wife updated by ep pacu rn. Verbalizes understanding. Remains in 3rd floor waiting room.  See flowsheet for full assessment. Bedrest x3hrs.    "

## 2019-10-14 NOTE — Clinical Note
Existing generator: ANDREW Tilley DR RF [id number]    SN:[id number]                            Implanted:3-6-13  Existing RA Lead: SJM Tendril ST Optim 188TC/52       SN:[id number]       Implanted: 3-6-13  Existing RV Lead: SJM Tendril ST Optim 188TC/58       SN:[id number]       Implanted: 3-6-13

## 2019-10-15 VITALS
OXYGEN SATURATION: 97 % | RESPIRATION RATE: 18 BRPM | HEART RATE: 73 BPM | SYSTOLIC BLOOD PRESSURE: 158 MMHG | HEIGHT: 74 IN | BODY MASS INDEX: 22.2 KG/M2 | DIASTOLIC BLOOD PRESSURE: 84 MMHG | WEIGHT: 173 LBS | TEMPERATURE: 98 F

## 2019-10-15 PROCEDURE — 25000003 PHARM REV CODE 250: Performed by: STUDENT IN AN ORGANIZED HEALTH CARE EDUCATION/TRAINING PROGRAM

## 2019-10-15 PROCEDURE — 63600175 PHARM REV CODE 636 W HCPCS: Performed by: STUDENT IN AN ORGANIZED HEALTH CARE EDUCATION/TRAINING PROGRAM

## 2019-10-15 PROCEDURE — 93010 EKG 12-LEAD: ICD-10-PCS | Mod: ,,, | Performed by: INTERNAL MEDICINE

## 2019-10-15 PROCEDURE — 93005 ELECTROCARDIOGRAM TRACING: CPT

## 2019-10-15 PROCEDURE — 93010 ELECTROCARDIOGRAM REPORT: CPT | Mod: ,,, | Performed by: INTERNAL MEDICINE

## 2019-10-15 RX ADMIN — CEFAZOLIN 2 G: 1 INJECTION, POWDER, FOR SOLUTION INTRAMUSCULAR; INTRAVENOUS at 04:10

## 2019-10-15 RX ADMIN — CEPHALEXIN 500 MG: 500 CAPSULE ORAL at 08:10

## 2019-10-15 NOTE — NURSING
Discharge instructions and medication list given and reviewed with patient.  Pt states understanding.  L chest dressing remains CDI, no bleeding or hematoma noted.  Sling and swathe on.  Tele and IV d/aida x 3.  Instructed patient to call with any questions or concerns.

## 2019-10-15 NOTE — PLAN OF CARE
Pt AAO x3, NAD, s/p Biventricular pacemaker upgrade and battery change on 10/14/19 per Dr Hanson, Lt CW incision, dsg CDI, no active bleeding and no hematoma noted. Sling and swathe in place to Lt arm. Plan of care reviewed with pt. Pt voids and ambulates without any difficulty. Call light within pt reach, pt instructed to call staff for any needed assistance, safety maintained. Pt denies needs/concerns att his time, will continue to monitor.

## 2019-10-15 NOTE — NURSING
"Pt c/o "hiccup" at pacemaker site which pt states happened earlier. Notified Dr Guy and EKG ordered and performed. Notified Dr Lucio of results and Dr Lucio is at pts bedside assessing pt.   "

## 2019-10-15 NOTE — PLAN OF CARE
Pt complained of hiccups and also muscle twitching when supine.  Pacer interrogated and EKG post done.  Pt states much better now.  Will cont on po antibiotics.

## 2019-10-15 NOTE — ANESTHESIA POSTPROCEDURE EVALUATION
Anesthesia Post Evaluation    Patient: Marcos Elaine    Procedure(s) Performed: Procedure(s) (LRB):  Biventricular pacemaker upgrade (N/A)    Final Anesthesia Type: general  Patient location during evaluation: PACU  Patient participation: Yes- Able to Participate  Level of consciousness: awake and alert  Post-procedure vital signs: reviewed and stable  Pain management: adequate  Airway patency: patent  PONV status at discharge: No PONV  Anesthetic complications: no      Cardiovascular status: blood pressure returned to baseline  Respiratory status: unassisted  Hydration status: euvolemic  Follow-up not needed.          Vitals Value Taken Time   /86 10/14/2019  8:14 PM   Temp 36.3 °C (97.4 °F) 10/14/2019  8:14 PM   Pulse 69 10/14/2019  8:14 PM   Resp 18 10/14/2019  8:14 PM   SpO2 97 % 10/14/2019  8:14 PM         No case tracking events are documented in the log.      Pain/Amaya Score: Pain Rating Prior to Med Admin: 5 (10/14/2019  2:29 PM)  Pain Rating Post Med Admin: 3 (10/14/2019  2:39 PM)  Amaya Score: 9 (10/14/2019  2:15 PM)

## 2019-10-21 ENCOUNTER — CLINICAL SUPPORT (OUTPATIENT)
Dept: CARDIOLOGY | Facility: HOSPITAL | Age: 64
End: 2019-10-21
Attending: INTERNAL MEDICINE
Payer: COMMERCIAL

## 2019-10-21 DIAGNOSIS — I44.2 CHB (COMPLETE HEART BLOCK): ICD-10-CM

## 2019-10-21 DIAGNOSIS — I42.0 DILATED CARDIOMYOPATHY: ICD-10-CM

## 2019-10-21 PROCEDURE — 93281 PM DEVICE PROGR EVAL MULTI: CPT

## 2019-10-30 ENCOUNTER — CLINICAL SUPPORT (OUTPATIENT)
Dept: CARDIOLOGY | Facility: HOSPITAL | Age: 64
End: 2019-10-30
Attending: INTERNAL MEDICINE
Payer: COMMERCIAL

## 2019-10-30 ENCOUNTER — DOCUMENTATION ONLY (OUTPATIENT)
Dept: ELECTROPHYSIOLOGY | Facility: CLINIC | Age: 64
End: 2019-10-30

## 2019-10-30 ENCOUNTER — TELEPHONE (OUTPATIENT)
Dept: CARDIOLOGY | Facility: HOSPITAL | Age: 64
End: 2019-10-30

## 2019-10-30 ENCOUNTER — PATIENT MESSAGE (OUTPATIENT)
Dept: ELECTROPHYSIOLOGY | Facility: CLINIC | Age: 64
End: 2019-10-30

## 2019-10-30 DIAGNOSIS — I44.1 SECOND DEGREE AV BLOCK: ICD-10-CM

## 2019-10-30 DIAGNOSIS — Z95.0 CARDIAC PACEMAKER IN SITU: ICD-10-CM

## 2019-10-30 NOTE — TELEPHONE ENCOUNTER
Patient called asking if Dr. Hanson received his message this morning. He stated he recently had his pacemaker changed out. He is now having a cloudy, white and clear liquid oozing from his site when he presses down on it. He stated having no fever, no chills, no achy feeling, no hot or warm feeling to the site. He does state that the site has a small red spot. Patient will come in to the device clinic today to see our Device nurse.

## 2019-10-30 NOTE — PROGRESS NOTES
Pt had some serosang drainage on shirt coming from medial aspect of incision. Seen in clinic. Incision well approximated, no drainage, swelling, fever, or pain.    Advised pt that his incision looks healthy. Continue to monitor and report s/s of infection, fever or poss retained suture. Pt verbalizes understanding. Wound care discussed.

## 2019-11-14 ENCOUNTER — DOCUMENTATION ONLY (OUTPATIENT)
Dept: CARDIOLOGY | Facility: HOSPITAL | Age: 64
End: 2019-11-14
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
## 2019-11-18 ENCOUNTER — TELEPHONE (OUTPATIENT)
Dept: ELECTROPHYSIOLOGY | Facility: CLINIC | Age: 64
End: 2019-11-18

## 2019-11-18 NOTE — TELEPHONE ENCOUNTER
Please forward call to ext [phone] this is regarding scheduling  ----- Message from Lilian Diaz sent at 11/18/2019 10:01 AM CST -----  Pt needs 3 mos post op M.D. appointment and coordinated device check, upgraded to CRT-P on 10/14/19.    Thanks,  Device Team

## 2019-11-20 ENCOUNTER — TELEPHONE (OUTPATIENT)
Dept: CARDIOLOGY | Facility: HOSPITAL | Age: 64
End: 2019-11-20

## 2019-11-20 NOTE — TELEPHONE ENCOUNTER
Contacted the patient on this afternoon and scheduled in clinic pacemaker check per Dr. Hanson.  Appt scheduled with the pt on 11/21/19 @ 10:20.  Patient was agreeable to this date and time.

## 2019-11-20 NOTE — TELEPHONE ENCOUNTER
----- Message from Jarod Hanson MD sent at 11/18/2019 12:24 PM CST -----  Can we bring him to test the lead manually and verify it is working MB  ----- Message -----  From: Lilian Diaz  Sent: 11/18/2019  10:03 AM CST  To: Jefe Clay, Jarod Hanson MD    Pt s/p upgrade to CRT-P 10/14/19, chronic RA and RV leads WNL.    LV lead demonstrating higher impedance via remote alert, now 1200 Ohms, was 690 at implant.    Anything to do?  Recall for Jan/Feb 2020    Thanks,  Lilian

## 2019-11-21 ENCOUNTER — CLINICAL SUPPORT (OUTPATIENT)
Dept: CARDIOLOGY | Facility: HOSPITAL | Age: 64
End: 2019-11-21
Attending: INTERNAL MEDICINE
Payer: COMMERCIAL

## 2019-11-21 DIAGNOSIS — I42.0 DILATED CARDIOMYOPATHY: ICD-10-CM

## 2019-11-21 DIAGNOSIS — I44.2 CHB (COMPLETE HEART BLOCK): ICD-10-CM

## 2019-11-21 PROCEDURE — 93281 PM DEVICE PROGR EVAL MULTI: CPT

## 2019-11-22 ENCOUNTER — CLINICAL SUPPORT (OUTPATIENT)
Dept: CARDIOLOGY | Facility: HOSPITAL | Age: 64
End: 2019-11-22
Payer: COMMERCIAL

## 2019-11-22 DIAGNOSIS — Z95.0 CARDIAC PACEMAKER IN SITU: ICD-10-CM

## 2019-11-22 PROCEDURE — 93294 REM INTERROG EVL PM/LDLS PM: CPT | Mod: ,,, | Performed by: INTERNAL MEDICINE

## 2019-11-22 PROCEDURE — 93296 REM INTERROG EVL PM/IDS: CPT | Performed by: INTERNAL MEDICINE

## 2019-11-22 PROCEDURE — 93294 CARDIAC DEVICE CHECK - REMOTE: ICD-10-PCS | Mod: ,,, | Performed by: INTERNAL MEDICINE

## 2020-01-12 ENCOUNTER — CLINICAL SUPPORT (OUTPATIENT)
Dept: CARDIOLOGY | Facility: HOSPITAL | Age: 65
End: 2020-01-12
Payer: MEDICARE

## 2020-01-12 DIAGNOSIS — I44.2 ATRIOVENTRICULAR BLOCK, COMPLETE: ICD-10-CM

## 2020-01-12 DIAGNOSIS — Z95.0 PRESENCE OF CARDIAC PACEMAKER: ICD-10-CM

## 2020-01-12 PROCEDURE — 93294 REM INTERROG EVL PM/LDLS PM: CPT | Mod: ,,, | Performed by: INTERNAL MEDICINE

## 2020-01-12 PROCEDURE — 93294 CARDIAC DEVICE CHECK - REMOTE: ICD-10-PCS | Mod: ,,, | Performed by: INTERNAL MEDICINE

## 2020-01-16 ENCOUNTER — PATIENT OUTREACH (OUTPATIENT)
Dept: ADMINISTRATIVE | Facility: OTHER | Age: 65
End: 2020-01-16

## 2020-01-21 ENCOUNTER — HOSPITAL ENCOUNTER (OUTPATIENT)
Dept: CARDIOLOGY | Facility: CLINIC | Age: 65
Discharge: HOME | End: 2020-01-21
Payer: COMMERCIAL

## 2020-01-21 ENCOUNTER — OFFICE VISIT (OUTPATIENT)
Dept: ELECTROPHYSIOLOGY | Facility: CLINIC | Age: 65
End: 2020-01-21
Payer: COMMERCIAL

## 2020-01-21 ENCOUNTER — CLINICAL SUPPORT (OUTPATIENT)
Dept: CARDIOLOGY | Facility: HOSPITAL | Age: 65
End: 2020-01-21
Attending: INTERNAL MEDICINE
Payer: COMMERCIAL

## 2020-01-21 VITALS
BODY MASS INDEX: 21.82 KG/M2 | WEIGHT: 170 LBS | HEART RATE: 66 BPM | SYSTOLIC BLOOD PRESSURE: 140 MMHG | HEIGHT: 74 IN | DIASTOLIC BLOOD PRESSURE: 82 MMHG

## 2020-01-21 DIAGNOSIS — I42.0 DILATED CARDIOMYOPATHY: ICD-10-CM

## 2020-01-21 DIAGNOSIS — Z95.0 CARDIAC PACEMAKER IN SITU: ICD-10-CM

## 2020-01-21 DIAGNOSIS — I44.2 CHB (COMPLETE HEART BLOCK): Primary | ICD-10-CM

## 2020-01-21 DIAGNOSIS — Z95.0 PRESENCE OF CARDIAC RESYNCHRONIZATION THERAPY PACEMAKER (CRT-P): ICD-10-CM

## 2020-01-21 DIAGNOSIS — I44.2 CHB (COMPLETE HEART BLOCK): ICD-10-CM

## 2020-01-21 PROCEDURE — 1159F PR MEDICATION LIST DOCUMENTED IN MEDICAL RECORD: ICD-10-PCS | Mod: S$GLB,,, | Performed by: INTERNAL MEDICINE

## 2020-01-21 PROCEDURE — 99999 PR PBB SHADOW E&M-EST. PATIENT-LVL III: CPT | Mod: PBBFAC,,, | Performed by: INTERNAL MEDICINE

## 2020-01-21 PROCEDURE — 93281 PM DEVICE PROGR EVAL MULTI: CPT

## 2020-01-21 PROCEDURE — 99999 PR PBB SHADOW E&M-EST. PATIENT-LVL III: ICD-10-PCS | Mod: PBBFAC,,, | Performed by: INTERNAL MEDICINE

## 2020-01-21 PROCEDURE — 99214 PR OFFICE/OUTPT VISIT, EST, LEVL IV, 30-39 MIN: ICD-10-PCS | Mod: S$GLB,,, | Performed by: INTERNAL MEDICINE

## 2020-01-21 PROCEDURE — 1159F MED LIST DOCD IN RCRD: CPT | Mod: S$GLB,,, | Performed by: INTERNAL MEDICINE

## 2020-01-21 PROCEDURE — 1126F PR PAIN SEVERITY QUANTIFIED, NO PAIN PRESENT: ICD-10-PCS | Mod: S$GLB,,, | Performed by: INTERNAL MEDICINE

## 2020-01-21 PROCEDURE — 93005 ELECTROCARDIOGRAM TRACING: CPT | Mod: S$GLB,,, | Performed by: INTERNAL MEDICINE

## 2020-01-21 PROCEDURE — 93010 ELECTROCARDIOGRAM REPORT: CPT | Mod: S$GLB,,, | Performed by: INTERNAL MEDICINE

## 2020-01-21 PROCEDURE — 93005 RHYTHM STRIP: ICD-10-PCS | Mod: S$GLB,,, | Performed by: INTERNAL MEDICINE

## 2020-01-21 PROCEDURE — 1126F AMNT PAIN NOTED NONE PRSNT: CPT | Mod: S$GLB,,, | Performed by: INTERNAL MEDICINE

## 2020-01-21 PROCEDURE — 93010 RHYTHM STRIP: ICD-10-PCS | Mod: S$GLB,,, | Performed by: INTERNAL MEDICINE

## 2020-01-21 PROCEDURE — 99214 OFFICE O/P EST MOD 30 MIN: CPT | Mod: S$GLB,,, | Performed by: INTERNAL MEDICINE

## 2020-01-21 NOTE — PROGRESS NOTES
Subjective:    Patient ID:  Marcos Elaine is a 64 y.o. male who presents for follow-up of CRT-P      64 yoM CHB s/p DC PM here for follow up after upgrade to CRT-P 10/14/19. He had 2:1 AVB s/p DC PM 3/6/97544. He progressed from intermittent RV pacing to 100% RV pacing over the past 2 years. He had change in EF from 55% to 45% with development of HF symptoms. PET stress was negative for ischemia. He went for CRT-P upgrade 10/14/19.     Since upgrade he has had improvement in HF symptoms. Normal CRT-P parameters today. No sustained arrhythmias.      Recent Cardiac Tests:     PET Stress 9/19:    The relative PET images are normal showing no clinically significant regional resting or stress induced perfusion defects.    Whole heart absolute myocardial perfusion (cc/min/g) averaged 0.97  at rest (which is elevated), 2.58 cc/min/g at stress (which is normal), and 2.70 cc/min/g CFR (which is low normal).    Gated perfusion images showed an ejection fraction of 51.0 % at rest and 66 % during stress. Normal is greater than 51%.    Wall motion was normal at rest and during stress.    LV cavity size is normal at rest and stress.    The EKG portion of this study is negative for ischemia.    There were no arrhythmias during stress.    The patient reported no chest pain during the stress test.    There are no prior studies for comparison.    9/19 echo:  · Mildly decreased left ventricular systolic function. The estimated ejection fraction is 45%  · Global hypokinetic wall motion.  · Mild left atrial enlargement.  · Grade I (mild) left ventricular diastolic dysfunction consistent with impaired relaxation.  · Normal left atrial pressure.  · Normal right ventricular systolic function.  · Normal central venous pressure (3 mm Hg).       2D Echo (8/29/2018):  CONCLUSIONS     1 - Low normal to mildly depressed left ventricular systolic function (EF 50-55%).     2 - Normal right ventricular systolic function .     3 -  Trivial aortic regurgitation.     4 - Mild mitral regurgitation.     5 - Trivial tricuspid regurgitation.     6 - Mild left atrial enlargement.      Past Medical History:  3/5/2013: 2:1 Second degree AV block  3/6/2013: Cardiac pacemaker  3/6/13: Heart block      Comment:  S/p DC PM  No date: Hyperlipidemia  2013: Personal history of colonic polyps      Comment:  Normal  ---Repeat colonoscopy in 10 years for                screening purposes. (Previous polyps were both                hyperplastic)    Past Surgical History:  No date: HERNIA REPAIR  10/14/2019: IMPLANTATION OF BIVENTRICULAR PERMANENT PACEMAKER AS   UPGRADE TO EXISTING PACEMAKER; N/A      Comment:  Procedure: Biventricular pacemaker upgrade;  Surgeon:                Jarod Hanson MD;  Location: [facility] EP LAB;  Service:               Cardiology;  Laterality: N/A;  NICM, CRT-P upgrade, SJM,                MAC, MB, 3 Prep *SJM PPM in situ*  No date: inguinal hernia repair      Comment:  x 2  3/6/13: INSERT / REPLACE / REMOVE PACEMAKER      Comment:  S/p DC PM  No date: SHOULDER SURGERY; Left    Social History    Socioeconomic History      Marital status:       Spouse name: Not on file      Number of children: Not on file      Years of education: Not on file      Highest education level: Not on file    Occupational History      Not on file    Social Needs      Financial resource strain: Not on file      Food insecurity:        Worry: Not on file        Inability: Not on file      Transportation needs:        Medical: Not on file        Non-medical: Not on file    Tobacco Use      Smoking status: Former Smoker        Types: Cigars        Quit date: 1973        Years since quittin.5      Smokeless tobacco: Never Used    Substance and Sexual Activity      Alcohol use: Yes        Alcohol/week: 7.0 standard drinks        Types: 5 Glasses of wine, 2 Cans of beer per week        Comment: Daily      Drug use: No      Sexual  activity: Not on file    Lifestyle      Physical activity:        Days per week: Not on file        Minutes per session: Not on file      Stress: Not on file    Relationships      Social connections:        Talks on phone: Not on file        Gets together: Not on file        Attends Gnosticist service: Not on file        Active member of club or organization: Not on file        Attends meetings of clubs or organizations: Not on file        Relationship status: Not on file    Other Topics      Concerns:        Not on file    Social History Narrative      Not on file      Review of patient's family history indicates:  Problem: Diabetes      Relation: Mother          Age of Onset: (Not Specified)  Problem: Cancer      Relation: Mother          Age of Onset: (Not Specified)  Problem: Cancer      Relation: Father          Age of Onset: (Not Specified)        Review of Systems   Constitution: Negative for malaise/fatigue.   Cardiovascular: Negative for chest pain, dyspnea on exertion, irregular heartbeat, leg swelling and palpitations.   Respiratory: Negative for shortness of breath.    Hematologic/Lymphatic: Negative for bleeding problem.   Skin: Negative for rash.   Musculoskeletal: Negative for myalgias.   Gastrointestinal: Negative for hematemesis, hematochezia and nausea.   Genitourinary: Negative for hematuria.   Neurological: Negative for light-headedness.   Psychiatric/Behavioral: Negative for altered mental status.   Allergic/Immunologic: Negative for persistent infections.        Objective:    Physical Exam   Constitutional: He is oriented to person, place, and time. He appears well-developed and well-nourished.   HENT:   Head: Normocephalic.   Nose: Nose normal.   Eyes: Pupils are equal, round, and reactive to light.   Cardiovascular: Normal rate, regular rhythm, S1 normal and S2 normal.   No murmur heard.  Pulses:       Radial pulses are 2+ on the right side, and 2+ on the left side.   Pulmonary/Chest: Breath  sounds normal. No respiratory distress.   Device to LUCW.   Abdominal: Normal appearance.   Musculoskeletal: Normal range of motion. He exhibits no edema.   Neurological: He is alert and oriented to person, place, and time.   Skin: Skin is warm and dry. No erythema.   Psychiatric: He has a normal mood and affect. His speech is normal and behavior is normal.   Nursing note and vitals reviewed.        Assessment:       1. CHB (complete heart block)    2. Presence of cardiac resynchronization therapy pacemaker (CRT-P)         Plan:       64 yoM CHB s/p upgrade to CRT-P here for post implant visit. Normal CRT-P function with no sustained arrhythmias and improved HF symptoms. I discussed routine device follow up including quarterly to bi-annual device checks for device function as well as yearly follow up in the EP clinic. The patient  was advised to call with any concerns regarding their device. Device clinic follow up as scheduled. RTC 1y

## 2020-04-11 ENCOUNTER — CLINICAL SUPPORT (OUTPATIENT)
Dept: CARDIOLOGY | Facility: HOSPITAL | Age: 65
End: 2020-04-11
Attending: INTERNAL MEDICINE
Payer: COMMERCIAL

## 2020-04-11 DIAGNOSIS — Z95.0 CARDIAC PACEMAKER IN SITU: ICD-10-CM

## 2020-04-11 PROCEDURE — 93294 REM INTERROG EVL PM/LDLS PM: CPT | Mod: ,,, | Performed by: INTERNAL MEDICINE

## 2020-04-11 PROCEDURE — 93294 CARDIAC DEVICE CHECK - REMOTE: ICD-10-PCS | Mod: ,,, | Performed by: INTERNAL MEDICINE

## 2020-04-11 PROCEDURE — 93296 REM INTERROG EVL PM/IDS: CPT | Performed by: INTERNAL MEDICINE

## 2020-06-08 ENCOUNTER — TELEPHONE (OUTPATIENT)
Dept: FAMILY MEDICINE | Facility: CLINIC | Age: 65
End: 2020-06-08

## 2020-06-18 ENCOUNTER — PATIENT MESSAGE (OUTPATIENT)
Dept: FAMILY MEDICINE | Facility: CLINIC | Age: 65
End: 2020-06-18

## 2020-06-18 DIAGNOSIS — R73.9 HYPERGLYCEMIA: ICD-10-CM

## 2020-06-18 DIAGNOSIS — N40.0 BENIGN PROSTATIC HYPERPLASIA, UNSPECIFIED WHETHER LOWER URINARY TRACT SYMPTOMS PRESENT: ICD-10-CM

## 2020-06-18 DIAGNOSIS — Z00.00 ROUTINE MEDICAL EXAM: Primary | ICD-10-CM

## 2020-06-18 DIAGNOSIS — E78.5 HYPERLIPIDEMIA, UNSPECIFIED HYPERLIPIDEMIA TYPE: ICD-10-CM

## 2020-06-19 ENCOUNTER — PATIENT MESSAGE (OUTPATIENT)
Dept: FAMILY MEDICINE | Facility: CLINIC | Age: 65
End: 2020-06-19

## 2020-07-10 ENCOUNTER — CLINICAL SUPPORT (OUTPATIENT)
Dept: CARDIOLOGY | Facility: HOSPITAL | Age: 65
End: 2020-07-10
Attending: INTERNAL MEDICINE
Payer: COMMERCIAL

## 2020-07-10 DIAGNOSIS — Z95.0 CARDIAC PACEMAKER IN SITU: ICD-10-CM

## 2020-07-10 PROCEDURE — 93296 REM INTERROG EVL PM/IDS: CPT | Performed by: INTERNAL MEDICINE

## 2020-07-10 PROCEDURE — 93294 CARDIAC DEVICE CHECK - REMOTE: ICD-10-PCS | Mod: ,,, | Performed by: INTERNAL MEDICINE

## 2020-07-10 PROCEDURE — 93294 REM INTERROG EVL PM/LDLS PM: CPT | Mod: ,,, | Performed by: INTERNAL MEDICINE

## 2020-07-16 ENCOUNTER — LAB VISIT (OUTPATIENT)
Dept: LAB | Facility: HOSPITAL | Age: 65
End: 2020-07-16
Attending: INTERNAL MEDICINE
Payer: COMMERCIAL

## 2020-07-16 DIAGNOSIS — N40.0 BENIGN PROSTATIC HYPERPLASIA, UNSPECIFIED WHETHER LOWER URINARY TRACT SYMPTOMS PRESENT: ICD-10-CM

## 2020-07-16 DIAGNOSIS — Z00.00 ROUTINE MEDICAL EXAM: ICD-10-CM

## 2020-07-16 DIAGNOSIS — E78.5 HYPERLIPIDEMIA, UNSPECIFIED HYPERLIPIDEMIA TYPE: ICD-10-CM

## 2020-07-16 DIAGNOSIS — R73.9 HYPERGLYCEMIA: ICD-10-CM

## 2020-07-16 LAB
ALBUMIN SERPL BCP-MCNC: 4 G/DL (ref 3.5–5.2)
ALP SERPL-CCNC: 79 U/L (ref 55–135)
ALT SERPL W/O P-5'-P-CCNC: 19 U/L (ref 10–44)
ANION GAP SERPL CALC-SCNC: 6 MMOL/L (ref 8–16)
AST SERPL-CCNC: 21 U/L (ref 10–40)
BASOPHILS # BLD AUTO: 0.04 K/UL (ref 0–0.2)
BASOPHILS NFR BLD: 0.7 % (ref 0–1.9)
BILIRUB SERPL-MCNC: 0.8 MG/DL (ref 0.1–1)
BUN SERPL-MCNC: 10 MG/DL (ref 8–23)
CALCIUM SERPL-MCNC: 9.5 MG/DL (ref 8.7–10.5)
CHLORIDE SERPL-SCNC: 104 MMOL/L (ref 95–110)
CHOLEST SERPL-MCNC: 183 MG/DL (ref 120–199)
CHOLEST/HDLC SERPL: 2.6 {RATIO} (ref 2–5)
CO2 SERPL-SCNC: 27 MMOL/L (ref 23–29)
COMPLEXED PSA SERPL-MCNC: 1.4 NG/ML (ref 0–4)
CREAT SERPL-MCNC: 1.1 MG/DL (ref 0.5–1.4)
DIFFERENTIAL METHOD: NORMAL
EOSINOPHIL # BLD AUTO: 0.3 K/UL (ref 0–0.5)
EOSINOPHIL NFR BLD: 5.4 % (ref 0–8)
ERYTHROCYTE [DISTWIDTH] IN BLOOD BY AUTOMATED COUNT: 13.5 % (ref 11.5–14.5)
EST. GFR  (AFRICAN AMERICAN): >60 ML/MIN/1.73 M^2
EST. GFR  (NON AFRICAN AMERICAN): >60 ML/MIN/1.73 M^2
ESTIMATED AVG GLUCOSE: 114 MG/DL (ref 68–131)
GLUCOSE SERPL-MCNC: 102 MG/DL (ref 70–110)
HBA1C MFR BLD HPLC: 5.6 % (ref 4–5.6)
HCT VFR BLD AUTO: 49.1 % (ref 40–54)
HDLC SERPL-MCNC: 70 MG/DL (ref 40–75)
HDLC SERPL: 38.3 % (ref 20–50)
HGB BLD-MCNC: 15.8 G/DL (ref 14–18)
IMM GRANULOCYTES # BLD AUTO: 0.01 K/UL (ref 0–0.04)
IMM GRANULOCYTES NFR BLD AUTO: 0.2 % (ref 0–0.5)
LDLC SERPL CALC-MCNC: 91.2 MG/DL (ref 63–159)
LYMPHOCYTES # BLD AUTO: 1.3 K/UL (ref 1–4.8)
LYMPHOCYTES NFR BLD: 24.4 % (ref 18–48)
MCH RBC QN AUTO: 29.9 PG (ref 27–31)
MCHC RBC AUTO-ENTMCNC: 32.2 G/DL (ref 32–36)
MCV RBC AUTO: 93 FL (ref 82–98)
MONOCYTES # BLD AUTO: 0.4 K/UL (ref 0.3–1)
MONOCYTES NFR BLD: 7.6 % (ref 4–15)
NEUTROPHILS # BLD AUTO: 3.3 K/UL (ref 1.8–7.7)
NEUTROPHILS NFR BLD: 61.7 % (ref 38–73)
NONHDLC SERPL-MCNC: 113 MG/DL
NRBC BLD-RTO: 0 /100 WBC
PLATELET # BLD AUTO: 226 K/UL (ref 150–350)
PMV BLD AUTO: 12.2 FL (ref 9.2–12.9)
POTASSIUM SERPL-SCNC: 4.5 MMOL/L (ref 3.5–5.1)
PROT SERPL-MCNC: 7.4 G/DL (ref 6–8.4)
RBC # BLD AUTO: 5.29 M/UL (ref 4.6–6.2)
SODIUM SERPL-SCNC: 137 MMOL/L (ref 136–145)
TRIGL SERPL-MCNC: 109 MG/DL (ref 30–150)
TSH SERPL DL<=0.005 MIU/L-ACNC: 1.18 UIU/ML (ref 0.4–4)
WBC # BLD AUTO: 5.36 K/UL (ref 3.9–12.7)

## 2020-07-16 PROCEDURE — 80053 COMPREHEN METABOLIC PANEL: CPT

## 2020-07-16 PROCEDURE — 85025 COMPLETE CBC W/AUTO DIFF WBC: CPT

## 2020-07-16 PROCEDURE — 36415 COLL VENOUS BLD VENIPUNCTURE: CPT | Mod: PO

## 2020-07-16 PROCEDURE — 80061 LIPID PANEL: CPT

## 2020-07-16 PROCEDURE — 84443 ASSAY THYROID STIM HORMONE: CPT

## 2020-07-16 PROCEDURE — 83036 HEMOGLOBIN GLYCOSYLATED A1C: CPT

## 2020-07-16 PROCEDURE — 84153 ASSAY OF PSA TOTAL: CPT

## 2020-07-23 ENCOUNTER — OFFICE VISIT (OUTPATIENT)
Dept: FAMILY MEDICINE | Facility: CLINIC | Age: 65
End: 2020-07-23
Payer: COMMERCIAL

## 2020-07-23 VITALS
SYSTOLIC BLOOD PRESSURE: 126 MMHG | TEMPERATURE: 98 F | HEART RATE: 70 BPM | WEIGHT: 168.63 LBS | HEIGHT: 74 IN | DIASTOLIC BLOOD PRESSURE: 72 MMHG | BODY MASS INDEX: 21.64 KG/M2 | OXYGEN SATURATION: 96 %

## 2020-07-23 DIAGNOSIS — Z86.010 HISTORY OF COLONIC POLYPS: ICD-10-CM

## 2020-07-23 DIAGNOSIS — E78.5 HYPERLIPIDEMIA, UNSPECIFIED HYPERLIPIDEMIA TYPE: ICD-10-CM

## 2020-07-23 DIAGNOSIS — Z00.00 ROUTINE MEDICAL EXAM: Primary | ICD-10-CM

## 2020-07-23 DIAGNOSIS — R73.9 HYPERGLYCEMIA: ICD-10-CM

## 2020-07-23 DIAGNOSIS — K21.9 GASTROESOPHAGEAL REFLUX DISEASE, ESOPHAGITIS PRESENCE NOT SPECIFIED: ICD-10-CM

## 2020-07-23 DIAGNOSIS — I44.1 SECOND DEGREE AV BLOCK: ICD-10-CM

## 2020-07-23 DIAGNOSIS — Z12.5 SCREENING FOR PROSTATE CANCER: ICD-10-CM

## 2020-07-23 DIAGNOSIS — Z95.0 CARDIAC PACEMAKER: ICD-10-CM

## 2020-07-23 DIAGNOSIS — Z87.19 HISTORY OF DIVERTICULITIS: ICD-10-CM

## 2020-07-23 PROCEDURE — 99999 PR PBB SHADOW E&M-EST. PATIENT-LVL III: CPT | Mod: PBBFAC,,, | Performed by: INTERNAL MEDICINE

## 2020-07-23 PROCEDURE — 90471 PNEUMOCOCCAL POLYSACCHARIDE VACCINE 23-VALENT =>2YO SQ IM: ICD-10-PCS | Mod: S$GLB,,, | Performed by: INTERNAL MEDICINE

## 2020-07-23 PROCEDURE — 90471 IMMUNIZATION ADMIN: CPT | Mod: S$GLB,,, | Performed by: INTERNAL MEDICINE

## 2020-07-23 PROCEDURE — 99999 PR PBB SHADOW E&M-EST. PATIENT-LVL III: ICD-10-PCS | Mod: PBBFAC,,, | Performed by: INTERNAL MEDICINE

## 2020-07-23 PROCEDURE — 90732 PPSV23 VACC 2 YRS+ SUBQ/IM: CPT | Mod: S$GLB,,, | Performed by: INTERNAL MEDICINE

## 2020-07-23 PROCEDURE — 99397 PER PM REEVAL EST PAT 65+ YR: CPT | Mod: 25,S$GLB,, | Performed by: INTERNAL MEDICINE

## 2020-07-23 PROCEDURE — 99397 PR PREVENTIVE VISIT,EST,65 & OVER: ICD-10-PCS | Mod: 25,S$GLB,, | Performed by: INTERNAL MEDICINE

## 2020-07-23 PROCEDURE — 90732 PNEUMOCOCCAL POLYSACCHARIDE VACCINE 23-VALENT =>2YO SQ IM: ICD-10-PCS | Mod: S$GLB,,, | Performed by: INTERNAL MEDICINE

## 2020-07-23 NOTE — PROGRESS NOTES
Chief complaint: Physical exam,     64-year-old white male.  From and health maintenance standpoint we reviewed his  labs.  His colonoscopy was normal in 2013 with a 10 year follow-up since previous polyps were hyperplastic.  He has had episodes of diverticulitis with central lower abdominal pain and we discussed probably best for him to come to the clinic when he gets another episode so we can confirm that it is not other conditions.  He gets occasional reflux we had a conversation regarding meds as well as the have the physiology of reflux and reflux precautions.  His labs are unremarkable and his lipids are well controlled.  He has no angina symptoms.    ROS:   CONST: weight stable. EYES: no vision change. ENT: no sore throat. CV: no chest pain w/ exertion. RESP: no shortness of breath. GI: no nausea, vomiting, diarrhea. No dysphagia. : no urinary issues. MUSCULOSKELETAL: no new myalgias or arthralgias. SKIN: no new changes. NEURO: no focal deficits. PSYCH: no new issues. ENDOCRINE: no polyuria. HEME: no lymph nodes. ALLERGY: no general pruritis.          Past Medical History:   Diagnosis Date    2:1 Second degree AV block 3/5/2013    Cardiac pacemaker 3/6/2013    Heart block 3/6/13    S/p DC PM    Hyperlipidemia     Personal history of colonic polyps 12/30/2013    Normal 12/13 ---Repeat colonoscopy in 10 years for screening purposes. (Previous polyps were both hyperplastic)   Diverticulitis  Occasional GERD          Past Surgical History:   Procedure Laterality Date    HERNIA REPAIR      IMPLANTATION OF BIVENTRICULAR PERMANENT PACEMAKER AS UPGRADE TO EXISTING PACEMAKER N/A 10/14/2019    Procedure: Biventricular pacemaker upgrade;  Surgeon: Jarod Hanson MD;  Location: [facility] EP LAB;  Service: Cardiology;  Laterality: N/A;  NICM, CRT-P upgrade, ANDREW, MAC, MB, 3 Prep *SJM PPM in situ*    inguinal hernia repair      x 2    INSERT / REPLACE / REMOVE PACEMAKER  3/6/13    S/p DC PM    SHOULDER SURGERY  "Left        Social History     Social History    Marital status:      Spouse name: N/A    Number of children: 3 boys in college    Years of education: N/A     Occupational History    realator     Social History Main Topics    Smoking status: Former Smoker     Types: Cigars     Quit date: 7/23/1973    Smokeless tobacco: Never Used    Alcohol use 6.0 oz/week     5 Glasses of wine, 5 Cans of beer per week      Comment: Daily    Drug use: No    Sexual activity: Not on file     Other Topics Concern    Not on file     Social History Narrative       family history includes Cancer in his father and mother; Diabetes in his mother.      Gen: no distress  EYES: conjunctiva clear, non-icteric, PERRL  ENT: nose clear, nasal mucosa normal, oropharynx clear and moist, teeth good  NECK:supple, thyroid non-palpable  RESP: effort is good, lungs clear  CV: heart RRR w/o murmur, gallops or rubs; no carotid bruits, no edema  GI: abdomen soft, non-distended, non-tender, no hepatosplenomegaly  MS: gait normal, no clubbing or cyanosis of the digits  SKIN: no rashes, warm to touch    Marcos was seen today for annual exam.    Diagnoses and all orders for this visit:    Routine medical exam    Screening for prostate cancer    History of colonic polyps    Hyperlipidemia, unspecified hyperlipidemia type    Hyperglycemia    Gastroesophageal reflux disease, esophagitis presence not specified    History of diverticulitis    2:1 Second degree AV block    Cardiac pacemaker    Other orders  -     Pneumococcal Polysaccharide Vaccine (23 Valent) (SQ/IM)         Clinical no be sensitive so as to not cause any issues regarding evaluation and management issues"lllll"This note will not be shared with the patient."  "

## 2020-07-23 NOTE — PROGRESS NOTES
Injection given in left deltoid. Tolerated well. Instructed to stay for 15 mins to rule out reactions. Verbalized understanding.

## 2020-09-12 ENCOUNTER — PATIENT MESSAGE (OUTPATIENT)
Dept: FAMILY MEDICINE | Facility: CLINIC | Age: 65
End: 2020-09-12

## 2020-09-12 DIAGNOSIS — E78.5 HYPERLIPIDEMIA, UNSPECIFIED HYPERLIPIDEMIA TYPE: ICD-10-CM

## 2020-09-14 RX ORDER — SIMVASTATIN 40 MG/1
40 TABLET, FILM COATED ORAL DAILY
Qty: 90 TABLET | Refills: 3 | Status: SHIPPED | OUTPATIENT
Start: 2020-09-14 | End: 2021-09-27

## 2020-09-17 ENCOUNTER — PATIENT MESSAGE (OUTPATIENT)
Dept: FAMILY MEDICINE | Facility: CLINIC | Age: 65
End: 2020-09-17

## 2020-10-08 ENCOUNTER — CLINICAL SUPPORT (OUTPATIENT)
Dept: CARDIOLOGY | Facility: HOSPITAL | Age: 65
End: 2020-10-08
Payer: MEDICARE

## 2020-10-08 DIAGNOSIS — Z95.0 PRESENCE OF CARDIAC PACEMAKER: ICD-10-CM

## 2020-10-08 PROCEDURE — 93294 CARDIAC DEVICE CHECK - REMOTE: ICD-10-PCS | Mod: ,,, | Performed by: INTERNAL MEDICINE

## 2020-10-08 PROCEDURE — 93294 REM INTERROG EVL PM/LDLS PM: CPT | Mod: ,,, | Performed by: INTERNAL MEDICINE

## 2020-10-08 PROCEDURE — 93296 REM INTERROG EVL PM/IDS: CPT | Performed by: INTERNAL MEDICINE

## 2020-10-25 ENCOUNTER — PATIENT MESSAGE (OUTPATIENT)
Dept: ELECTROPHYSIOLOGY | Facility: CLINIC | Age: 65
End: 2020-10-25

## 2020-12-04 ENCOUNTER — TELEPHONE (OUTPATIENT)
Dept: ELECTROPHYSIOLOGY | Facility: CLINIC | Age: 65
End: 2020-12-04

## 2020-12-04 DIAGNOSIS — I44.2 CHB (COMPLETE HEART BLOCK): Primary | ICD-10-CM

## 2021-01-06 ENCOUNTER — CLINICAL SUPPORT (OUTPATIENT)
Dept: CARDIOLOGY | Facility: HOSPITAL | Age: 66
End: 2021-01-06
Payer: MEDICARE

## 2021-01-06 DIAGNOSIS — I44.2 ATRIOVENTRICULAR BLOCK, COMPLETE: ICD-10-CM

## 2021-01-06 DIAGNOSIS — Z95.0 PRESENCE OF CARDIAC PACEMAKER: ICD-10-CM

## 2021-01-06 PROCEDURE — 93294 REM INTERROG EVL PM/LDLS PM: CPT | Mod: ,,, | Performed by: INTERNAL MEDICINE

## 2021-01-06 PROCEDURE — 93294 CARDIAC DEVICE CHECK - REMOTE: ICD-10-PCS | Mod: ,,, | Performed by: INTERNAL MEDICINE

## 2021-01-06 PROCEDURE — 93296 REM INTERROG EVL PM/IDS: CPT | Performed by: INTERNAL MEDICINE

## 2021-02-17 ENCOUNTER — PATIENT OUTREACH (OUTPATIENT)
Dept: ADMINISTRATIVE | Facility: OTHER | Age: 66
End: 2021-02-17

## 2021-02-18 ENCOUNTER — HOSPITAL ENCOUNTER (OUTPATIENT)
Dept: CARDIOLOGY | Facility: CLINIC | Age: 66
Discharge: HOME | End: 2021-02-18
Attending: INTERNAL MEDICINE
Payer: MEDICARE

## 2021-02-18 ENCOUNTER — OFFICE VISIT (OUTPATIENT)
Dept: ELECTROPHYSIOLOGY | Facility: CLINIC | Age: 66
End: 2021-02-18
Attending: INTERNAL MEDICINE
Payer: MEDICARE

## 2021-02-18 ENCOUNTER — CLINICAL SUPPORT (OUTPATIENT)
Dept: CARDIOLOGY | Facility: HOSPITAL | Age: 66
End: 2021-02-18
Attending: INTERNAL MEDICINE
Payer: MEDICARE

## 2021-02-18 VITALS
DIASTOLIC BLOOD PRESSURE: 78 MMHG | BODY MASS INDEX: 22.3 KG/M2 | HEART RATE: 70 BPM | WEIGHT: 173.75 LBS | HEIGHT: 74 IN | SYSTOLIC BLOOD PRESSURE: 130 MMHG

## 2021-02-18 DIAGNOSIS — I44.2 CHB (COMPLETE HEART BLOCK): ICD-10-CM

## 2021-02-18 DIAGNOSIS — Z95.0 PRESENCE OF CARDIAC RESYNCHRONIZATION THERAPY PACEMAKER (CRT-P): Primary | ICD-10-CM

## 2021-02-18 DIAGNOSIS — I42.0 DILATED CARDIOMYOPATHY: ICD-10-CM

## 2021-02-18 PROCEDURE — 99999 PR PBB SHADOW E&M-EST. PATIENT-LVL III: ICD-10-PCS | Mod: PBBFAC,,, | Performed by: INTERNAL MEDICINE

## 2021-02-18 PROCEDURE — 99213 OFFICE O/P EST LOW 20 MIN: CPT | Mod: PBBFAC,25 | Performed by: INTERNAL MEDICINE

## 2021-02-18 PROCEDURE — 93281 PM DEVICE PROGR EVAL MULTI: CPT | Mod: 26,,, | Performed by: INTERNAL MEDICINE

## 2021-02-18 PROCEDURE — 99999 PR PBB SHADOW E&M-EST. PATIENT-LVL III: CPT | Mod: PBBFAC,,, | Performed by: INTERNAL MEDICINE

## 2021-02-18 PROCEDURE — 93281 PM DEVICE PROGR EVAL MULTI: CPT

## 2021-02-18 PROCEDURE — 93010 ELECTROCARDIOGRAM REPORT: CPT | Mod: S$PBB,,, | Performed by: INTERNAL MEDICINE

## 2021-02-18 PROCEDURE — 99214 OFFICE O/P EST MOD 30 MIN: CPT | Mod: S$PBB,,, | Performed by: INTERNAL MEDICINE

## 2021-02-18 PROCEDURE — 93281 CARDIAC DEVICE CHECK - IN CLINIC & HOSPITAL: ICD-10-PCS | Mod: 26,,, | Performed by: INTERNAL MEDICINE

## 2021-02-18 PROCEDURE — 93010 RHYTHM STRIP: ICD-10-PCS | Mod: S$PBB,,, | Performed by: INTERNAL MEDICINE

## 2021-02-18 PROCEDURE — 93005 ELECTROCARDIOGRAM TRACING: CPT | Mod: PBBFAC,59 | Performed by: INTERNAL MEDICINE

## 2021-02-18 PROCEDURE — 99214 PR OFFICE/OUTPT VISIT, EST, LEVL IV, 30-39 MIN: ICD-10-PCS | Mod: S$PBB,,, | Performed by: INTERNAL MEDICINE

## 2021-02-19 ENCOUNTER — HOSPITAL ENCOUNTER (OUTPATIENT)
Dept: CARDIOLOGY | Facility: HOSPITAL | Age: 66
Discharge: HOME | End: 2021-02-19
Attending: INTERNAL MEDICINE
Payer: MEDICARE

## 2021-02-19 ENCOUNTER — PATIENT MESSAGE (OUTPATIENT)
Dept: ELECTROPHYSIOLOGY | Facility: CLINIC | Age: 66
End: 2021-02-19

## 2021-02-19 VITALS
HEIGHT: 74 IN | DIASTOLIC BLOOD PRESSURE: 75 MMHG | WEIGHT: 173 LBS | BODY MASS INDEX: 22.2 KG/M2 | SYSTOLIC BLOOD PRESSURE: 130 MMHG | HEART RATE: 70 BPM

## 2021-02-19 LAB
AV INDEX (PROSTH): 0.8
AV MEAN GRADIENT: 3 MMHG
AV PEAK GRADIENT: 6 MMHG
AV VALVE AREA: 3.09 CM2
AV VELOCITY RATIO: 0.82
BSA FOR ECHO PROCEDURE: 2.02 M2
CV ECHO LV RWT: 0.44 CM
DOP CALC AO PEAK VEL: 1.23 M/S
DOP CALC AO VTI: 25.39 CM
DOP CALC LVOT AREA: 3.9 CM2
DOP CALC LVOT DIAMETER: 2.22 CM
DOP CALC LVOT PEAK VEL: 1.01 M/S
DOP CALC LVOT STROKE VOLUME: 78.5 CM3
DOP CALCLVOT PEAK VEL VTI: 20.29 CM
E WAVE DECELERATION TIME: 189.88 MSEC
E/A RATIO: 0.9
E/E' RATIO: 7.86 M/S
ECHO LV POSTERIOR WALL: 1.01 CM (ref 0.6–1.1)
FRACTIONAL SHORTENING: 25 % (ref 28–44)
INTERVENTRICULAR SEPTUM: 1.02 CM (ref 0.6–1.1)
IVRT: 91.34 MSEC
LA MAJOR: 3.71 CM
LA MINOR: 4.08 CM
LA WIDTH: 3.93 CM
LEFT ATRIUM SIZE: 3.7 CM
LEFT ATRIUM VOLUME INDEX MOD: 20.3 ML/M2
LEFT ATRIUM VOLUME INDEX: 23.5 ML/M2
LEFT ATRIUM VOLUME MOD: 41.32 CM3
LEFT ATRIUM VOLUME: 48.03 CM3
LEFT INTERNAL DIMENSION IN SYSTOLE: 3.45 CM (ref 2.1–4)
LEFT VENTRICLE DIASTOLIC VOLUME INDEX: 47.65 ML/M2
LEFT VENTRICLE DIASTOLIC VOLUME: 97.2 ML
LEFT VENTRICLE MASS INDEX: 79 G/M2
LEFT VENTRICLE SYSTOLIC VOLUME INDEX: 24 ML/M2
LEFT VENTRICLE SYSTOLIC VOLUME: 48.97 ML
LEFT VENTRICULAR INTERNAL DIMENSION IN DIASTOLE: 4.6 CM (ref 3.5–6)
LEFT VENTRICULAR MASS: 162.09 G
LV LATERAL E/E' RATIO: 6.88 M/S
LV SEPTAL E/E' RATIO: 9.17 M/S
MV A" WAVE DURATION": 18.27 MSEC
MV PEAK A VEL: 0.61 M/S
MV PEAK E VEL: 0.55 M/S
MV STENOSIS PRESSURE HALF TIME: 55.06 MS
MV VALVE AREA P 1/2 METHOD: 4 CM2
PISA TR MAX VEL: 2.34 M/S
PULM VEIN S/D RATIO: 1.05
PV PEAK D VEL: 0.4 M/S
PV PEAK S VEL: 0.42 M/S
RA MAJOR: 3.59 CM
RA PRESSURE: 8 MMHG
RA WIDTH: 2.93 CM
RIGHT VENTRICULAR END-DIASTOLIC DIMENSION: 3.22 CM
RV TISSUE DOPPLER FREE WALL SYSTOLIC VELOCITY 1 (APICAL 4 CHAMBER VIEW): 15.91 CM/S
SINUS: 3.8 CM
STJ: 3.44 CM
TDI LATERAL: 0.08 M/S
TDI SEPTAL: 0.06 M/S
TDI: 0.07 M/S
TR MAX PG: 22 MMHG
TRICUSPID ANNULAR PLANE SYSTOLIC EXCURSION: 2.77 CM
TV REST PULMONARY ARTERY PRESSURE: 30 MMHG

## 2021-02-19 PROCEDURE — 93306 ECHO (CUPID ONLY): ICD-10-PCS | Mod: 26,,, | Performed by: INTERNAL MEDICINE

## 2021-02-19 PROCEDURE — 93306 TTE W/DOPPLER COMPLETE: CPT

## 2021-02-19 PROCEDURE — 93306 TTE W/DOPPLER COMPLETE: CPT | Mod: 26,,, | Performed by: INTERNAL MEDICINE

## 2021-04-06 ENCOUNTER — CLINICAL SUPPORT (OUTPATIENT)
Dept: CARDIOLOGY | Facility: HOSPITAL | Age: 66
End: 2021-04-06
Payer: MEDICARE

## 2021-04-06 DIAGNOSIS — Z95.0 PRESENCE OF CARDIAC PACEMAKER: ICD-10-CM

## 2021-04-06 PROCEDURE — 93294 REM INTERROG EVL PM/LDLS PM: CPT | Mod: ,,, | Performed by: INTERNAL MEDICINE

## 2021-04-06 PROCEDURE — 93296 REM INTERROG EVL PM/IDS: CPT | Performed by: INTERNAL MEDICINE

## 2021-04-06 PROCEDURE — 93294 CARDIAC DEVICE CHECK - REMOTE: ICD-10-PCS | Mod: ,,, | Performed by: INTERNAL MEDICINE

## 2021-04-12 ENCOUNTER — PATIENT OUTREACH (OUTPATIENT)
Dept: ADMINISTRATIVE | Facility: OTHER | Age: 66
End: 2021-04-12

## 2021-04-13 ENCOUNTER — OFFICE VISIT (OUTPATIENT)
Dept: OPTOMETRY | Facility: CLINIC | Age: 66
End: 2021-04-13
Payer: MEDICARE

## 2021-04-13 DIAGNOSIS — Z13.5 GLAUCOMA SCREENING: ICD-10-CM

## 2021-04-13 DIAGNOSIS — H52.223 MYOPIA OF BOTH EYES WITH REGULAR ASTIGMATISM AND PRESBYOPIA: ICD-10-CM

## 2021-04-13 DIAGNOSIS — H25.13 NUCLEAR SCLEROSIS, BILATERAL: Primary | ICD-10-CM

## 2021-04-13 DIAGNOSIS — H52.13 MYOPIA OF BOTH EYES WITH REGULAR ASTIGMATISM AND PRESBYOPIA: ICD-10-CM

## 2021-04-13 DIAGNOSIS — H52.4 MYOPIA OF BOTH EYES WITH REGULAR ASTIGMATISM AND PRESBYOPIA: ICD-10-CM

## 2021-04-13 PROCEDURE — 99999 PR PBB SHADOW E&M-EST. PATIENT-LVL III: ICD-10-PCS | Mod: PBBFAC,,, | Performed by: OPTOMETRIST

## 2021-04-13 PROCEDURE — 92015 PR REFRACTION: ICD-10-PCS | Mod: ,,, | Performed by: OPTOMETRIST

## 2021-04-13 PROCEDURE — 92014 COMPRE OPH EXAM EST PT 1/>: CPT | Mod: S$PBB,,, | Performed by: OPTOMETRIST

## 2021-04-13 PROCEDURE — 92015 DETERMINE REFRACTIVE STATE: CPT | Mod: ,,, | Performed by: OPTOMETRIST

## 2021-04-13 PROCEDURE — 99213 OFFICE O/P EST LOW 20 MIN: CPT | Mod: PBBFAC,PO | Performed by: OPTOMETRIST

## 2021-04-13 PROCEDURE — 99999 PR PBB SHADOW E&M-EST. PATIENT-LVL III: CPT | Mod: PBBFAC,,, | Performed by: OPTOMETRIST

## 2021-04-13 PROCEDURE — 92014 PR EYE EXAM, EST PATIENT,COMPREHESV: ICD-10-PCS | Mod: S$PBB,,, | Performed by: OPTOMETRIST

## 2021-07-05 ENCOUNTER — CLINICAL SUPPORT (OUTPATIENT)
Dept: CARDIOLOGY | Facility: HOSPITAL | Age: 66
End: 2021-07-05
Payer: COMMERCIAL

## 2021-07-05 DIAGNOSIS — Z95.0 PRESENCE OF CARDIAC PACEMAKER: ICD-10-CM

## 2021-07-05 DIAGNOSIS — I44.2 ATRIOVENTRICULAR BLOCK, COMPLETE: ICD-10-CM

## 2021-07-05 PROCEDURE — 93294 CARDIAC DEVICE CHECK - REMOTE: ICD-10-PCS | Mod: ,,, | Performed by: INTERNAL MEDICINE

## 2021-07-05 PROCEDURE — 93296 REM INTERROG EVL PM/IDS: CPT | Performed by: INTERNAL MEDICINE

## 2021-07-05 PROCEDURE — 93294 REM INTERROG EVL PM/LDLS PM: CPT | Mod: ,,, | Performed by: INTERNAL MEDICINE

## 2021-10-03 ENCOUNTER — CLINICAL SUPPORT (OUTPATIENT)
Dept: CARDIOLOGY | Facility: HOSPITAL | Age: 66
End: 2021-10-03
Attending: INTERNAL MEDICINE
Payer: MEDICARE

## 2021-10-03 DIAGNOSIS — Z95.0 PRESENCE OF CARDIAC PACEMAKER: ICD-10-CM

## 2021-10-03 PROCEDURE — 93296 REM INTERROG EVL PM/IDS: CPT | Performed by: INTERNAL MEDICINE

## 2021-10-03 PROCEDURE — 93294 REM INTERROG EVL PM/LDLS PM: CPT | Mod: ,,, | Performed by: INTERNAL MEDICINE

## 2021-10-03 PROCEDURE — 93294 CARDIAC DEVICE CHECK - REMOTE: ICD-10-PCS | Mod: ,,, | Performed by: INTERNAL MEDICINE

## 2021-10-18 ENCOUNTER — TELEPHONE (OUTPATIENT)
Dept: FAMILY MEDICINE | Facility: CLINIC | Age: 66
End: 2021-10-18

## 2021-10-18 DIAGNOSIS — N40.0 BENIGN PROSTATIC HYPERPLASIA, UNSPECIFIED WHETHER LOWER URINARY TRACT SYMPTOMS PRESENT: ICD-10-CM

## 2021-10-18 DIAGNOSIS — E78.5 HYPERLIPIDEMIA, UNSPECIFIED HYPERLIPIDEMIA TYPE: Primary | ICD-10-CM

## 2021-10-18 DIAGNOSIS — R73.9 HYPERGLYCEMIA: ICD-10-CM

## 2021-10-22 ENCOUNTER — LAB VISIT (OUTPATIENT)
Dept: LAB | Facility: HOSPITAL | Age: 66
End: 2021-10-22
Attending: INTERNAL MEDICINE
Payer: MEDICARE

## 2021-10-22 DIAGNOSIS — R73.9 HYPERGLYCEMIA: ICD-10-CM

## 2021-10-22 DIAGNOSIS — N40.0 BENIGN PROSTATIC HYPERPLASIA, UNSPECIFIED WHETHER LOWER URINARY TRACT SYMPTOMS PRESENT: ICD-10-CM

## 2021-10-22 DIAGNOSIS — E78.5 HYPERLIPIDEMIA, UNSPECIFIED HYPERLIPIDEMIA TYPE: ICD-10-CM

## 2021-10-22 LAB
ALBUMIN SERPL BCP-MCNC: 4 G/DL (ref 3.5–5.2)
ALP SERPL-CCNC: 73 U/L (ref 55–135)
ALT SERPL W/O P-5'-P-CCNC: 22 U/L (ref 10–44)
ANION GAP SERPL CALC-SCNC: 8 MMOL/L (ref 8–16)
AST SERPL-CCNC: 23 U/L (ref 10–40)
BASOPHILS # BLD AUTO: 0.02 K/UL (ref 0–0.2)
BASOPHILS NFR BLD: 0.4 % (ref 0–1.9)
BILIRUB SERPL-MCNC: 0.7 MG/DL (ref 0.1–1)
BUN SERPL-MCNC: 11 MG/DL (ref 8–23)
CALCIUM SERPL-MCNC: 9.6 MG/DL (ref 8.7–10.5)
CHLORIDE SERPL-SCNC: 106 MMOL/L (ref 95–110)
CHOLEST SERPL-MCNC: 171 MG/DL (ref 120–199)
CHOLEST/HDLC SERPL: 2.6 {RATIO} (ref 2–5)
CO2 SERPL-SCNC: 24 MMOL/L (ref 23–29)
COMPLEXED PSA SERPL-MCNC: 1.9 NG/ML (ref 0–4)
CREAT SERPL-MCNC: 0.9 MG/DL (ref 0.5–1.4)
DIFFERENTIAL METHOD: NORMAL
EOSINOPHIL # BLD AUTO: 0.2 K/UL (ref 0–0.5)
EOSINOPHIL NFR BLD: 3.6 % (ref 0–8)
ERYTHROCYTE [DISTWIDTH] IN BLOOD BY AUTOMATED COUNT: 13.3 % (ref 11.5–14.5)
EST. GFR  (AFRICAN AMERICAN): >60 ML/MIN/1.73 M^2
EST. GFR  (NON AFRICAN AMERICAN): >60 ML/MIN/1.73 M^2
GLUCOSE SERPL-MCNC: 102 MG/DL (ref 70–110)
HCT VFR BLD AUTO: 46.7 % (ref 40–54)
HDLC SERPL-MCNC: 67 MG/DL (ref 40–75)
HDLC SERPL: 39.2 % (ref 20–50)
HGB BLD-MCNC: 15.2 G/DL (ref 14–18)
IMM GRANULOCYTES # BLD AUTO: 0.01 K/UL (ref 0–0.04)
IMM GRANULOCYTES NFR BLD AUTO: 0.2 % (ref 0–0.5)
LDLC SERPL CALC-MCNC: 86.8 MG/DL (ref 63–159)
LYMPHOCYTES # BLD AUTO: 1.3 K/UL (ref 1–4.8)
LYMPHOCYTES NFR BLD: 26.9 % (ref 18–48)
MCH RBC QN AUTO: 30 PG (ref 27–31)
MCHC RBC AUTO-ENTMCNC: 32.5 G/DL (ref 32–36)
MCV RBC AUTO: 92 FL (ref 82–98)
MONOCYTES # BLD AUTO: 0.4 K/UL (ref 0.3–1)
MONOCYTES NFR BLD: 8.3 % (ref 4–15)
NEUTROPHILS # BLD AUTO: 2.8 K/UL (ref 1.8–7.7)
NEUTROPHILS NFR BLD: 60.6 % (ref 38–73)
NONHDLC SERPL-MCNC: 104 MG/DL
NRBC BLD-RTO: 0 /100 WBC
PLATELET # BLD AUTO: 238 K/UL (ref 150–450)
PMV BLD AUTO: 12 FL (ref 9.2–12.9)
POTASSIUM SERPL-SCNC: 4 MMOL/L (ref 3.5–5.1)
PROT SERPL-MCNC: 7.2 G/DL (ref 6–8.4)
RBC # BLD AUTO: 5.07 M/UL (ref 4.6–6.2)
SODIUM SERPL-SCNC: 138 MMOL/L (ref 136–145)
TRIGL SERPL-MCNC: 86 MG/DL (ref 30–150)
TSH SERPL DL<=0.005 MIU/L-ACNC: 1.31 UIU/ML (ref 0.4–4)
WBC # BLD AUTO: 4.69 K/UL (ref 3.9–12.7)

## 2021-10-22 PROCEDURE — 85025 COMPLETE CBC W/AUTO DIFF WBC: CPT | Performed by: INTERNAL MEDICINE

## 2021-10-22 PROCEDURE — 36415 COLL VENOUS BLD VENIPUNCTURE: CPT | Mod: PO | Performed by: INTERNAL MEDICINE

## 2021-10-22 PROCEDURE — 80061 LIPID PANEL: CPT | Performed by: INTERNAL MEDICINE

## 2021-10-22 PROCEDURE — 84153 ASSAY OF PSA TOTAL: CPT | Performed by: INTERNAL MEDICINE

## 2021-10-22 PROCEDURE — 80053 COMPREHEN METABOLIC PANEL: CPT | Performed by: INTERNAL MEDICINE

## 2021-10-22 PROCEDURE — 84443 ASSAY THYROID STIM HORMONE: CPT | Performed by: INTERNAL MEDICINE

## 2021-11-04 ENCOUNTER — OFFICE VISIT (OUTPATIENT)
Dept: FAMILY MEDICINE | Facility: CLINIC | Age: 66
End: 2021-11-04
Payer: MEDICARE

## 2021-11-04 VITALS
DIASTOLIC BLOOD PRESSURE: 70 MMHG | OXYGEN SATURATION: 97 % | HEART RATE: 70 BPM | SYSTOLIC BLOOD PRESSURE: 136 MMHG | BODY MASS INDEX: 21.95 KG/M2 | TEMPERATURE: 98 F | WEIGHT: 171.06 LBS | HEIGHT: 74 IN

## 2021-11-04 DIAGNOSIS — Z12.11 SCREENING FOR COLORECTAL CANCER: ICD-10-CM

## 2021-11-04 DIAGNOSIS — Z12.12 SCREENING FOR COLORECTAL CANCER: ICD-10-CM

## 2021-11-04 DIAGNOSIS — Z00.00 ROUTINE MEDICAL EXAM: ICD-10-CM

## 2021-11-04 DIAGNOSIS — I44.1 SECOND DEGREE AV BLOCK: ICD-10-CM

## 2021-11-04 DIAGNOSIS — Z86.010 HISTORY OF COLONIC POLYPS: ICD-10-CM

## 2021-11-04 DIAGNOSIS — R73.9 HYPERGLYCEMIA: ICD-10-CM

## 2021-11-04 DIAGNOSIS — Z12.5 SCREENING FOR PROSTATE CANCER: ICD-10-CM

## 2021-11-04 DIAGNOSIS — Z87.19 HISTORY OF DIVERTICULITIS: ICD-10-CM

## 2021-11-04 DIAGNOSIS — I44.2 CHB (COMPLETE HEART BLOCK): ICD-10-CM

## 2021-11-04 DIAGNOSIS — Z95.0 CARDIAC PACEMAKER: ICD-10-CM

## 2021-11-04 DIAGNOSIS — N40.0 BENIGN PROSTATIC HYPERPLASIA, UNSPECIFIED WHETHER LOWER URINARY TRACT SYMPTOMS PRESENT: ICD-10-CM

## 2021-11-04 DIAGNOSIS — E78.5 HYPERLIPIDEMIA, UNSPECIFIED HYPERLIPIDEMIA TYPE: Primary | ICD-10-CM

## 2021-11-04 PROCEDURE — 99999 PR PBB SHADOW E&M-EST. PATIENT-LVL III: ICD-10-PCS | Mod: PBBFAC,,, | Performed by: INTERNAL MEDICINE

## 2021-11-04 PROCEDURE — 99215 PR OFFICE/OUTPT VISIT, EST, LEVL V, 40-54 MIN: ICD-10-PCS | Mod: S$PBB,,, | Performed by: INTERNAL MEDICINE

## 2021-11-04 PROCEDURE — 99999 PR PBB SHADOW E&M-EST. PATIENT-LVL III: CPT | Mod: PBBFAC,,, | Performed by: INTERNAL MEDICINE

## 2021-11-04 PROCEDURE — 99215 OFFICE O/P EST HI 40 MIN: CPT | Mod: S$PBB,,, | Performed by: INTERNAL MEDICINE

## 2021-11-04 PROCEDURE — 99213 OFFICE O/P EST LOW 20 MIN: CPT | Mod: PBBFAC,PO | Performed by: INTERNAL MEDICINE

## 2021-11-04 RX ORDER — SIMVASTATIN 40 MG/1
40 TABLET, FILM COATED ORAL DAILY
Qty: 90 TABLET | Refills: 4 | Status: SHIPPED | OUTPATIENT
Start: 2021-11-04 | End: 2022-11-30

## 2022-01-01 ENCOUNTER — CLINICAL SUPPORT (OUTPATIENT)
Dept: CARDIOLOGY | Facility: HOSPITAL | Age: 67
End: 2022-01-01
Payer: MEDICARE

## 2022-01-01 DIAGNOSIS — I44.2 ATRIOVENTRICULAR BLOCK, COMPLETE: ICD-10-CM

## 2022-01-01 DIAGNOSIS — Z95.0 PRESENCE OF CARDIAC PACEMAKER: ICD-10-CM

## 2022-01-01 PROCEDURE — 93294 CARDIAC DEVICE CHECK - REMOTE: ICD-10-PCS | Mod: ,,, | Performed by: INTERNAL MEDICINE

## 2022-01-01 PROCEDURE — 93294 REM INTERROG EVL PM/LDLS PM: CPT | Mod: ,,, | Performed by: INTERNAL MEDICINE

## 2022-01-07 DIAGNOSIS — Z95.0 CARDIAC PACEMAKER IN SITU: ICD-10-CM

## 2022-01-07 DIAGNOSIS — I44.2 CHB (COMPLETE HEART BLOCK): Primary | ICD-10-CM

## 2022-04-01 ENCOUNTER — CLINICAL SUPPORT (OUTPATIENT)
Dept: CARDIOLOGY | Facility: HOSPITAL | Age: 67
End: 2022-04-01
Payer: COMMERCIAL

## 2022-04-01 DIAGNOSIS — I44.2 ATRIOVENTRICULAR BLOCK, COMPLETE: ICD-10-CM

## 2022-04-01 DIAGNOSIS — Z95.0 PRESENCE OF CARDIAC PACEMAKER: ICD-10-CM

## 2022-05-04 ENCOUNTER — TELEPHONE (OUTPATIENT)
Dept: SURGERY | Facility: CLINIC | Age: 67
End: 2022-05-04
Payer: COMMERCIAL

## 2022-05-05 ENCOUNTER — OFFICE VISIT (OUTPATIENT)
Dept: SURGERY | Facility: CLINIC | Age: 67
End: 2022-05-05
Payer: MEDICARE

## 2022-05-05 VITALS
HEART RATE: 75 BPM | SYSTOLIC BLOOD PRESSURE: 161 MMHG | WEIGHT: 170.63 LBS | DIASTOLIC BLOOD PRESSURE: 90 MMHG | BODY MASS INDEX: 21.9 KG/M2 | HEIGHT: 74 IN

## 2022-05-05 DIAGNOSIS — K64.8 INTERNAL HEMORRHOIDS WITH COMPLICATION: Primary | ICD-10-CM

## 2022-05-05 PROCEDURE — 99203 OFFICE O/P NEW LOW 30 MIN: CPT | Mod: 25,S$PBB,, | Performed by: COLON & RECTAL SURGERY

## 2022-05-05 PROCEDURE — 46600 PR DIAG2STIC A2SCOPY: ICD-10-PCS | Mod: S$PBB,,, | Performed by: COLON & RECTAL SURGERY

## 2022-05-05 PROCEDURE — 99214 OFFICE O/P EST MOD 30 MIN: CPT | Mod: PBBFAC | Performed by: COLON & RECTAL SURGERY

## 2022-05-05 PROCEDURE — 46600 DIAGNOSTIC ANOSCOPY SPX: CPT | Mod: PBBFAC | Performed by: COLON & RECTAL SURGERY

## 2022-05-05 PROCEDURE — 46600 DIAGNOSTIC ANOSCOPY SPX: CPT | Mod: S$PBB,,, | Performed by: COLON & RECTAL SURGERY

## 2022-05-05 PROCEDURE — 99999 PR PBB SHADOW E&M-EST. PATIENT-LVL IV: ICD-10-PCS | Mod: PBBFAC,,, | Performed by: COLON & RECTAL SURGERY

## 2022-05-05 PROCEDURE — 99203 PR OFFICE/OUTPT VISIT, NEW, LEVL III, 30-44 MIN: ICD-10-PCS | Mod: 25,S$PBB,, | Performed by: COLON & RECTAL SURGERY

## 2022-05-05 PROCEDURE — 99999 PR PBB SHADOW E&M-EST. PATIENT-LVL IV: CPT | Mod: PBBFAC,,, | Performed by: COLON & RECTAL SURGERY

## 2022-05-05 RX ORDER — LIDOCAINE HYDROCHLORIDE 10 MG/ML
1 INJECTION, SOLUTION EPIDURAL; INFILTRATION; INTRACAUDAL; PERINEURAL ONCE
Status: CANCELLED | OUTPATIENT
Start: 2022-05-05 | End: 2022-05-05

## 2022-05-05 NOTE — PROGRESS NOTES
June 7  Subjective:       Patient ID: Marcos Elaine is a 66 y.o. male.    Chief Complaint: Rectal Pain and Rectal Bleeding    HPI  .  New patient.  Had previous rubber-band ligation and now has recurrent symptoms with tissue protruding.  No manual reduction.  Some bleeding with wiping.  No pain with bowel movements.  Denies constipation.  Last colonoscopy 2013.       Review of Systems   Constitutional: Negative for chills and fever.   Respiratory: Negative for cough and shortness of breath.    Cardiovascular: Negative for chest pain and palpitations.   Gastrointestinal: Negative for nausea and vomiting.   Genitourinary: Negative for dysuria and urgency.   Neurological: Negative for seizures and numbness.         Objective:      Physical Exam  Constitutional:       Appearance: He is well-developed.   Eyes:      Conjunctiva/sclera: Conjunctivae normal.   Pulmonary:      Effort: Pulmonary effort is normal. No respiratory distress.   Genitourinary:     Comments: Anorectal Exam:     Perianal skin: normal.  Visible prolapsing internal hemorrhoid posterolateral.    FANNY: Normal resting and squeeze tone.  No masses. Nontender.    Anoscopy:  Normal distal rectal mucosa. Internal hemorrhoid left posterolateral with nodular protrusion.  This extends down into the external portion of that column.    Musculoskeletal:         General: Normal range of motion.   Skin:     General: Skin is warm and dry.   Neurological:      Mental Status: He is alert and oriented to person, place, and time.         Assessment:       Problem List Items Addressed This Visit    None     Visit Diagnoses     Internal hemorrhoids with complication    -  Primary          Plan:       Excisional hemorrhoidectomy.  June 7

## 2022-05-09 ENCOUNTER — TELEPHONE (OUTPATIENT)
Dept: ELECTROPHYSIOLOGY | Facility: CLINIC | Age: 67
End: 2022-05-09
Payer: COMMERCIAL

## 2022-05-10 ENCOUNTER — CLINICAL SUPPORT (OUTPATIENT)
Dept: CARDIOLOGY | Facility: HOSPITAL | Age: 67
End: 2022-05-10
Attending: INTERNAL MEDICINE
Payer: MEDICARE

## 2022-05-10 ENCOUNTER — OFFICE VISIT (OUTPATIENT)
Dept: ELECTROPHYSIOLOGY | Facility: CLINIC | Age: 67
End: 2022-05-10
Payer: MEDICARE

## 2022-05-10 ENCOUNTER — HOSPITAL ENCOUNTER (OUTPATIENT)
Dept: CARDIOLOGY | Facility: CLINIC | Age: 67
Discharge: HOME | End: 2022-05-10
Attending: INTERNAL MEDICINE
Payer: MEDICARE

## 2022-05-10 VITALS
DIASTOLIC BLOOD PRESSURE: 82 MMHG | WEIGHT: 170.19 LBS | SYSTOLIC BLOOD PRESSURE: 118 MMHG | BODY MASS INDEX: 21.84 KG/M2 | HEART RATE: 60 BPM | HEIGHT: 74 IN

## 2022-05-10 DIAGNOSIS — Z95.0 CARDIAC PACEMAKER IN SITU: ICD-10-CM

## 2022-05-10 DIAGNOSIS — Z95.0 PRESENCE OF CARDIAC RESYNCHRONIZATION THERAPY PACEMAKER (CRT-P): ICD-10-CM

## 2022-05-10 DIAGNOSIS — I44.2 CHB (COMPLETE HEART BLOCK): ICD-10-CM

## 2022-05-10 DIAGNOSIS — I44.2 CHB (COMPLETE HEART BLOCK): Primary | ICD-10-CM

## 2022-05-10 PROCEDURE — 99214 PR OFFICE/OUTPT VISIT, EST, LEVL IV, 30-39 MIN: ICD-10-PCS | Mod: S$PBB,,, | Performed by: INTERNAL MEDICINE

## 2022-05-10 PROCEDURE — 93005 ELECTROCARDIOGRAM TRACING: CPT | Mod: PBBFAC,59 | Performed by: INTERNAL MEDICINE

## 2022-05-10 PROCEDURE — 93281 PM DEVICE PROGR EVAL MULTI: CPT | Mod: 26,,, | Performed by: INTERNAL MEDICINE

## 2022-05-10 PROCEDURE — 99999 PR PBB SHADOW E&M-EST. PATIENT-LVL III: ICD-10-PCS | Mod: PBBFAC,,, | Performed by: INTERNAL MEDICINE

## 2022-05-10 PROCEDURE — 99999 PR PBB SHADOW E&M-EST. PATIENT-LVL III: CPT | Mod: PBBFAC,,, | Performed by: INTERNAL MEDICINE

## 2022-05-10 PROCEDURE — 93281 PM DEVICE PROGR EVAL MULTI: CPT

## 2022-05-10 PROCEDURE — 93010 RHYTHM STRIP: ICD-10-PCS | Mod: S$PBB,,, | Performed by: INTERNAL MEDICINE

## 2022-05-10 PROCEDURE — 99213 OFFICE O/P EST LOW 20 MIN: CPT | Mod: PBBFAC | Performed by: INTERNAL MEDICINE

## 2022-05-10 PROCEDURE — 93281 CARDIAC DEVICE CHECK - IN CLINIC & HOSPITAL: ICD-10-PCS | Mod: 26,,, | Performed by: INTERNAL MEDICINE

## 2022-05-10 PROCEDURE — 93010 ELECTROCARDIOGRAM REPORT: CPT | Mod: S$PBB,,, | Performed by: INTERNAL MEDICINE

## 2022-05-10 PROCEDURE — 99214 OFFICE O/P EST MOD 30 MIN: CPT | Mod: S$PBB,,, | Performed by: INTERNAL MEDICINE

## 2022-05-10 NOTE — PROGRESS NOTES
Subjective:    Patient ID:  Marcos Elaine is a 66 y.o. male who presents for follow-up of CHB      66 yoM CHB s/p DC PM here for follow up after upgrade to CRT-P 10/14/19. He had 2:1 AVB s/p DC PM 3/6/51034. He progressed from intermittent RV pacing to 100% RV pacing over the past 2 years. He had change in EF from 55% to 45% with development of HF symptoms. PET stress was negative for ischemia. He went for CRT-P upgrade 10/14/19.     1/21/2020: Since upgrade he has had improvement in HF symptoms. Normal CRT-P parameters today. No sustained arrhythmias.     2/21: Stable symptoms. Normal CRT-P function    Interval history: Normal CRT-P function with no underlying. No sustained arrhythmias.      Recent Cardiac Tests:     PET Stress 9/19:    The relative PET images are normal showing no clinically significant regional resting or stress induced perfusion defects.    Whole heart absolute myocardial perfusion (cc/min/g) averaged 0.97  at rest (which is elevated), 2.58 cc/min/g at stress (which is normal), and 2.70 cc/min/g CFR (which is low normal).    Gated perfusion images showed an ejection fraction of 51.0 % at rest and 66 % during stress. Normal is greater than 51%.    Wall motion was normal at rest and during stress.    LV cavity size is normal at rest and stress.    The EKG portion of this study is negative for ischemia.    There were no arrhythmias during stress.    The patient reported no chest pain during the stress test.    There are no prior studies for comparison.    9/19 echo:  · Mildly decreased left ventricular systolic function. The estimated ejection fraction is 45%  · Global hypokinetic wall motion.  · Mild left atrial enlargement.  · Grade I (mild) left ventricular diastolic dysfunction consistent with impaired relaxation.  · Normal left atrial pressure.  · Normal right ventricular systolic function.  · Normal central venous pressure (3 mm Hg).    2D Echo (8/29/2018):  CONCLUSIONS     1 -  Low normal to mildly depressed left ventricular systolic function (EF 50-55%).     2 - Normal right ventricular systolic function .     3 - Trivial aortic regurgitation.     4 - Mild mitral regurgitation.     5 - Trivial tricuspid regurgitation.     6 - Mild left atrial enlargement.      Past Medical History:  3/5/2013: 2:1 Second degree AV block  3/6/2013: Cardiac pacemaker  3/6/13: Heart block      Comment:  S/p DC PM  No date: Hyperlipidemia  2013: Personal history of colonic polyps      Comment:  Normal  ---Repeat colonoscopy in 10 years for                screening purposes. (Previous polyps were both                hyperplastic)    Past Surgical History:  No date: HERNIA REPAIR  10/14/2019: IMPLANTATION OF BIVENTRICULAR PERMANENT PACEMAKER AS   UPGRADE TO EXISTING PACEMAKER; N/A      Comment:  Procedure: Biventricular pacemaker upgrade;  Surgeon:                Jarod Hanson MD;  Location: [facility] EP LAB;  Service:               Cardiology;  Laterality: N/A;  NICM, CRT-P upgrade, SJM,                MAC, MB, 3 Prep *SJM PPM in situ*  No date: inguinal hernia repair      Comment:  x 2  3/6/13: INSERT / REPLACE / REMOVE PACEMAKER      Comment:  S/p DC PM  No date: SHOULDER SURGERY; Left    Social History    Socioeconomic History      Marital status:     Tobacco Use      Smoking status: Former Smoker        Types: Cigars        Quit date: 1973        Years since quittin.8      Smokeless tobacco: Never Used    Substance and Sexual Activity      Alcohol use: Yes        Alcohol/week: 7.0 standard drinks        Types: 5 Glasses of wine, 2 Cans of beer per week        Comment: Daily      Drug use: No    Social Determinants of Health  Financial Resource Strain: Low Risk       Difficulty of Paying Living Expenses: Not hard at all  Food Insecurity: No Food Insecurity      Worried About Running Out of Food in the Last Year: Never true      Ran Out of Food in the Last Year: Never  true  Transportation Needs: No Transportation Needs      Lack of Transportation (Medical): No      Lack of Transportation (Non-Medical): No  Physical Activity: Inactive      Days of Exercise per Week: 0 days      Minutes of Exercise per Session: 0 min  Stress: No Stress Concern Present      Feeling of Stress : Not at all  Social Connections: Unknown      Frequency of Communication with Friends and Family: More than three times a week      Frequency of Social Gatherings with Friends and Family: More than three times a week      Active Member of Clubs or Organizations: Yes      Attends Club or Organization Meetings: More than 4 times per year      Marital Status:   Housing Stability: Low Risk       Unable to Pay for Housing in the Last Year: No      Number of Places Lived in the Last Year: 1      Unstable Housing in the Last Year: No    Review of patient's family history indicates:  Problem: Diabetes      Relation: Mother          Age of Onset: (Not Specified)  Problem: Cancer      Relation: Mother          Age of Onset: (Not Specified)  Problem: Cancer      Relation: Father          Age of Onset: (Not Specified)    Current Outpatient Medications:  AFLURIA QUAD 2019-20,6MO UP, 60 mcg (15 mcg x 4)/0.5 mL Susp, INTO MUSCLE, Disp: , Rfl: 0  CALCIUM POLYCARBOPHIL (KONSYL FIBER ORAL), Take by mouth., Disp: , Rfl:   diazePAM (VALIUM) 5 MG tablet, , Disp: , Rfl:   fluticasone propionate (FLONASE) 50 mcg/actuation nasal spray, 1 spray (50 mcg total) by Each Nostril route once daily., Disp: 16 g, Rfl: 2  glucosamine-chondroitin 500-400 mg tablet, Take 1 tablet by mouth 3 (three) times daily., Disp: , Rfl:   multivitamin capsule, Take 1 capsule by mouth once daily., Disp: , Rfl:   simvastatin (ZOCOR) 40 MG tablet, Take 1 tablet (40 mg total) by mouth once daily., Disp: 90 tablet, Rfl: 4    No current facility-administered medications for this visit.  Facility-Administered Medications Ordered in Other Visits:  0.9%  NaCl  infusion, , Intravenous, Continuous, Elsa Young, NP, 1,000 mL at 10/14/19 1000        Review of Systems   Constitutional: Negative for malaise/fatigue.   Cardiovascular: Negative for chest pain, dyspnea on exertion, irregular heartbeat, leg swelling and palpitations.   Respiratory: Negative for shortness of breath.    Hematologic/Lymphatic: Negative for bleeding problem.   Skin: Negative for rash.   Musculoskeletal: Negative for myalgias.   Gastrointestinal: Negative for hematemesis, hematochezia and nausea.   Genitourinary: Negative for hematuria.   Neurological: Negative for light-headedness.   Psychiatric/Behavioral: Negative for altered mental status.   Allergic/Immunologic: Negative for persistent infections.        Objective:    Physical Exam  Vitals and nursing note reviewed.   Constitutional:       Appearance: Normal appearance. He is well-developed.   HENT:      Head: Normocephalic.      Nose: Nose normal.   Eyes:      Pupils: Pupils are equal, round, and reactive to light.   Cardiovascular:      Rate and Rhythm: Normal rate and regular rhythm.      Pulses:           Radial pulses are 2+ on the right side and 2+ on the left side.      Heart sounds: S1 normal and S2 normal. No murmur heard.  Pulmonary:      Effort: No respiratory distress.      Breath sounds: Normal breath sounds.   Musculoskeletal:         General: Normal range of motion.   Skin:     General: Skin is warm and dry.      Findings: No erythema.   Neurological:      Mental Status: He is alert and oriented to person, place, and time.   Psychiatric:         Speech: Speech normal.         Behavior: Behavior normal.           Assessment:       1. CHB (complete heart block)    2. Presence of cardiac resynchronization therapy pacemaker (CRT-P)         Plan:       66 yoM CHB s/p CRT-P here for routine follow up. Normal CRT-P function with no sustained arrhythmias. I discussed routine device follow up including quarterly to bi-annual device  checks for device function as well as yearly follow up in the EP clinic. The patient  was advised to call with any concerns regarding their device. Device clinic follow up as scheduled. RTC 1y

## 2022-06-06 ENCOUNTER — DOCUMENTATION ONLY (OUTPATIENT)
Dept: ELECTROPHYSIOLOGY | Facility: CLINIC | Age: 67
End: 2022-06-06
Payer: COMMERCIAL

## 2022-06-06 ENCOUNTER — TELEPHONE (OUTPATIENT)
Dept: SURGERY | Facility: CLINIC | Age: 67
End: 2022-06-06
Payer: COMMERCIAL

## 2022-06-06 NOTE — PROGRESS NOTES
Patient has been identified as having an implanted cardiac rhythm device (CRD); the implanted device is a [employer] pacemaker.      Planned procedure:  Hemorrhoidectomy.    Pacer dependency has been noted.       PACEMAKERS/DEPENDENT BELOW WAIST    The reported surgical procedure is BELOW the umbilicus; per protocol, magnet application is not needed and device reprogramming is not required.    For additional questions, please contact the Arrhythmia Department at Ext [phone].

## 2022-06-06 NOTE — PRE-PROCEDURE INSTRUCTIONS
Preoperative NPO and Bowel Prep instructions given per CRS Dept. Patient's questions answered and patient V/C/U.    PREOP INSTRUCTIONS:    Shower instructions as well as directions to the Surgery Center were given.Encouraged to wear loose fitting,comfortable clothing.Medication instructions for pm prior to and am of procedure reviewed.Instructed to avoid taking vitamins,supplements,aspirin and ibuprofen the morning of surgery.     Patient advised of the updated visitor policy   Patient denies any side effects or issues with anesthesia or sedation.      Patient given arrival to [place] [id number].

## 2022-06-07 ENCOUNTER — HOSPITAL ENCOUNTER (OUTPATIENT)
Facility: HOSPITAL | Age: 67
Discharge: HOME | End: 2022-06-07
Attending: COLON & RECTAL SURGERY | Admitting: COLON & RECTAL SURGERY
Payer: MEDICARE

## 2022-06-07 ENCOUNTER — ANESTHESIA (OUTPATIENT)
Dept: SURGERY | Facility: HOSPITAL | Age: 67
End: 2022-06-07
Payer: MEDICARE

## 2022-06-07 ENCOUNTER — ANESTHESIA EVENT (OUTPATIENT)
Dept: SURGERY | Facility: HOSPITAL | Age: 67
End: 2022-06-07
Payer: MEDICARE

## 2022-06-07 VITALS
HEIGHT: 74 IN | TEMPERATURE: 98 F | WEIGHT: 170 LBS | OXYGEN SATURATION: 95 % | DIASTOLIC BLOOD PRESSURE: 85 MMHG | BODY MASS INDEX: 21.82 KG/M2 | RESPIRATION RATE: 18 BRPM | SYSTOLIC BLOOD PRESSURE: 167 MMHG | HEART RATE: 60 BPM

## 2022-06-07 DIAGNOSIS — K64.9 HEMORRHOIDS: ICD-10-CM

## 2022-06-07 PROCEDURE — 37000009 HC ANESTHESIA EA ADD 15 MINS: Performed by: COLON & RECTAL SURGERY

## 2022-06-07 PROCEDURE — 25000003 PHARM REV CODE 250: Performed by: STUDENT IN AN ORGANIZED HEALTH CARE EDUCATION/TRAINING PROGRAM

## 2022-06-07 PROCEDURE — 25000003 PHARM REV CODE 250: Performed by: NURSE PRACTITIONER

## 2022-06-07 PROCEDURE — 88304 PR  SURG PATH,LEVEL III: ICD-10-PCS | Mod: 26,,, | Performed by: PATHOLOGY

## 2022-06-07 PROCEDURE — D9220A PRA ANESTHESIA: ICD-10-PCS | Mod: ,,, | Performed by: STUDENT IN AN ORGANIZED HEALTH CARE EDUCATION/TRAINING PROGRAM

## 2022-06-07 PROCEDURE — 25000003 PHARM REV CODE 250: Performed by: COLON & RECTAL SURGERY

## 2022-06-07 PROCEDURE — 88304 TISSUE EXAM BY PATHOLOGIST: CPT | Mod: 26,,, | Performed by: PATHOLOGY

## 2022-06-07 PROCEDURE — 63600175 PHARM REV CODE 636 W HCPCS: Performed by: STUDENT IN AN ORGANIZED HEALTH CARE EDUCATION/TRAINING PROGRAM

## 2022-06-07 PROCEDURE — 36000707: Performed by: COLON & RECTAL SURGERY

## 2022-06-07 PROCEDURE — 46255 REMOVE INT/EXT HEM 1 GROUP: CPT | Mod: GC,,, | Performed by: COLON & RECTAL SURGERY

## 2022-06-07 PROCEDURE — D9220A PRA ANESTHESIA: Mod: ,,, | Performed by: STUDENT IN AN ORGANIZED HEALTH CARE EDUCATION/TRAINING PROGRAM

## 2022-06-07 PROCEDURE — 71000015 HC POSTOP RECOV 1ST HR: Performed by: COLON & RECTAL SURGERY

## 2022-06-07 PROCEDURE — 88304 TISSUE EXAM BY PATHOLOGIST: CPT | Performed by: PATHOLOGY

## 2022-06-07 PROCEDURE — 71000044 HC DOSC ROUTINE RECOVERY FIRST HOUR: Performed by: COLON & RECTAL SURGERY

## 2022-06-07 PROCEDURE — 37000008 HC ANESTHESIA 1ST 15 MINUTES: Performed by: COLON & RECTAL SURGERY

## 2022-06-07 PROCEDURE — 36000706: Performed by: COLON & RECTAL SURGERY

## 2022-06-07 PROCEDURE — 46255 PR HEMORRHOIDECTOMY,INT/EXT,1 COLUMN/GROUP: ICD-10-PCS | Mod: GC,,, | Performed by: COLON & RECTAL SURGERY

## 2022-06-07 RX ORDER — POLYETHYLENE GLYCOL 3350 17 G/17G
17 POWDER, FOR SOLUTION ORAL NIGHTLY PRN
Qty: 510 G | Refills: 0 | Status: SHIPPED | OUTPATIENT
Start: 2022-06-07 | End: 2022-07-12

## 2022-06-07 RX ORDER — OXYCODONE HYDROCHLORIDE 5 MG/1
5 TABLET ORAL
Status: CANCELLED | OUTPATIENT
Start: 2022-06-07

## 2022-06-07 RX ORDER — OXYCODONE HYDROCHLORIDE 5 MG/1
5 TABLET ORAL
Qty: 30 TABLET | Refills: 0 | Status: SHIPPED | OUTPATIENT
Start: 2022-06-07 | End: 2022-07-12

## 2022-06-07 RX ORDER — OXYCODONE HYDROCHLORIDE 5 MG/1
5 TABLET ORAL EVERY 4 HOURS PRN
Status: DISCONTINUED | OUTPATIENT
Start: 2022-06-07 | End: 2022-06-07 | Stop reason: HOSPADM

## 2022-06-07 RX ORDER — MUPIROCIN 20 MG/G
OINTMENT TOPICAL
Status: DISCONTINUED
Start: 2022-06-07 | End: 2022-06-07 | Stop reason: HOSPADM

## 2022-06-07 RX ORDER — SODIUM CHLORIDE 9 MG/ML
INJECTION, SOLUTION INTRAVENOUS CONTINUOUS
Status: DISCONTINUED | OUTPATIENT
Start: 2022-06-07 | End: 2022-06-07 | Stop reason: HOSPADM

## 2022-06-07 RX ORDER — LIDOCAINE HYDROCHLORIDE 20 MG/ML
INJECTION INTRAVENOUS
Status: DISCONTINUED | OUTPATIENT
Start: 2022-06-07 | End: 2022-06-07

## 2022-06-07 RX ORDER — ONDANSETRON 2 MG/ML
4 INJECTION INTRAMUSCULAR; INTRAVENOUS DAILY PRN
Status: CANCELLED | OUTPATIENT
Start: 2022-06-07

## 2022-06-07 RX ORDER — MUPIROCIN 20 MG/G
OINTMENT TOPICAL
Status: ACTIVE | OUTPATIENT
Start: 2022-06-07

## 2022-06-07 RX ORDER — IBUPROFEN 800 MG/1
800 TABLET ORAL EVERY 8 HOURS
Qty: 60 TABLET | Refills: 1 | Status: SHIPPED | OUTPATIENT
Start: 2022-06-07 | End: 2023-05-16

## 2022-06-07 RX ORDER — NEOSTIGMINE METHYLSULFATE 0.5 MG/ML
INJECTION, SOLUTION INTRAVENOUS
Status: DISCONTINUED | OUTPATIENT
Start: 2022-06-07 | End: 2022-06-07

## 2022-06-07 RX ORDER — HALOPERIDOL 5 MG/ML
0.5 INJECTION INTRAMUSCULAR EVERY 10 MIN PRN
Status: CANCELLED | OUTPATIENT
Start: 2022-06-07

## 2022-06-07 RX ORDER — MIDAZOLAM HYDROCHLORIDE 1 MG/ML
INJECTION, SOLUTION INTRAMUSCULAR; INTRAVENOUS
Status: DISCONTINUED | OUTPATIENT
Start: 2022-06-07 | End: 2022-06-07

## 2022-06-07 RX ORDER — ACETAMINOPHEN 500 MG
1000 TABLET ORAL EVERY 8 HOURS
Qty: 60 TABLET | Refills: 1 | Status: SHIPPED | OUTPATIENT
Start: 2022-06-07 | End: 2022-07-12

## 2022-06-07 RX ORDER — PSYLLIUM HUSK 3.4 G/5.8G
1 POWDER ORAL 2 TIMES DAILY
Qty: 60 PACKET | Refills: 0 | Status: SHIPPED | OUTPATIENT
Start: 2022-06-07 | End: 2022-07-12

## 2022-06-07 RX ORDER — GABAPENTIN 100 MG/1
200 CAPSULE ORAL EVERY 8 HOURS
Qty: 42 CAPSULE | Refills: 0 | Status: SHIPPED | OUTPATIENT
Start: 2022-06-07 | End: 2022-07-12

## 2022-06-07 RX ORDER — BUPIVACAINE HYDROCHLORIDE 2.5 MG/ML
INJECTION, SOLUTION EPIDURAL; INFILTRATION; INTRACAUDAL
Status: DISCONTINUED | OUTPATIENT
Start: 2022-06-07 | End: 2022-06-07 | Stop reason: HOSPADM

## 2022-06-07 RX ORDER — SODIUM CHLORIDE 0.9 % (FLUSH) 0.9 %
10 SYRINGE (ML) INJECTION
Status: DISCONTINUED | OUTPATIENT
Start: 2022-06-07 | End: 2022-06-07 | Stop reason: HOSPADM

## 2022-06-07 RX ORDER — SODIUM CHLORIDE 9 MG/ML
INJECTION, SOLUTION INTRAVENOUS CONTINUOUS
Status: ACTIVE | OUTPATIENT
Start: 2022-06-07

## 2022-06-07 RX ORDER — ROCURONIUM BROMIDE 10 MG/ML
INJECTION, SOLUTION INTRAVENOUS
Status: DISCONTINUED | OUTPATIENT
Start: 2022-06-07 | End: 2022-06-07

## 2022-06-07 RX ORDER — PROPOFOL 10 MG/ML
VIAL (ML) INTRAVENOUS
Status: DISCONTINUED | OUTPATIENT
Start: 2022-06-07 | End: 2022-06-07

## 2022-06-07 RX ORDER — FENTANYL CITRATE 50 UG/ML
INJECTION, SOLUTION INTRAMUSCULAR; INTRAVENOUS
Status: DISCONTINUED | OUTPATIENT
Start: 2022-06-07 | End: 2022-06-07

## 2022-06-07 RX ORDER — HYDROMORPHONE HYDROCHLORIDE 1 MG/ML
0.2 INJECTION, SOLUTION INTRAMUSCULAR; INTRAVENOUS; SUBCUTANEOUS EVERY 5 MIN PRN
Status: CANCELLED | OUTPATIENT
Start: 2022-06-07

## 2022-06-07 RX ORDER — FENTANYL CITRATE 50 UG/ML
25 INJECTION, SOLUTION INTRAMUSCULAR; INTRAVENOUS EVERY 5 MIN PRN
Status: CANCELLED | OUTPATIENT
Start: 2022-06-07

## 2022-06-07 RX ORDER — OXYCODONE HYDROCHLORIDE 5 MG/1
10 TABLET ORAL EVERY 4 HOURS PRN
Status: DISCONTINUED | OUTPATIENT
Start: 2022-06-07 | End: 2022-06-07 | Stop reason: HOSPADM

## 2022-06-07 RX ORDER — DEXAMETHASONE SODIUM PHOSPHATE 4 MG/ML
INJECTION, SOLUTION INTRA-ARTICULAR; INTRALESIONAL; INTRAMUSCULAR; INTRAVENOUS; SOFT TISSUE
Status: DISCONTINUED | OUTPATIENT
Start: 2022-06-07 | End: 2022-06-07

## 2022-06-07 RX ORDER — ONDANSETRON 2 MG/ML
INJECTION INTRAMUSCULAR; INTRAVENOUS
Status: DISCONTINUED | OUTPATIENT
Start: 2022-06-07 | End: 2022-06-07

## 2022-06-07 RX ORDER — PHENYLEPHRINE HYDROCHLORIDE 10 MG/ML
INJECTION INTRAVENOUS
Status: DISCONTINUED | OUTPATIENT
Start: 2022-06-07 | End: 2022-06-07

## 2022-06-07 RX ADMIN — ROCURONIUM BROMIDE 40 MG: 10 INJECTION, SOLUTION INTRAVENOUS at 12:06

## 2022-06-07 RX ADMIN — ONDANSETRON HYDROCHLORIDE 4 MG: 2 INJECTION INTRAMUSCULAR; INTRAVENOUS at 01:06

## 2022-06-07 RX ADMIN — GLYCOPYRROLATE 0.4 MG: 0.2 INJECTION, SOLUTION INTRAMUSCULAR; INTRAVENOUS at 01:06

## 2022-06-07 RX ADMIN — SODIUM CHLORIDE: 0.9 INJECTION, SOLUTION INTRAVENOUS at 12:06

## 2022-06-07 RX ADMIN — LIDOCAINE HYDROCHLORIDE 80 MG: 20 INJECTION, SOLUTION INTRAVENOUS at 12:06

## 2022-06-07 RX ADMIN — MUPIROCIN: 20 OINTMENT TOPICAL at 10:06

## 2022-06-07 RX ADMIN — NEOSTIGMINE METHYLSULFATE 4 MG: 0.5 INJECTION INTRAVENOUS at 01:06

## 2022-06-07 RX ADMIN — PROPOFOL 140 MG: 10 INJECTION, EMULSION INTRAVENOUS at 12:06

## 2022-06-07 RX ADMIN — FENTANYL CITRATE 100 MCG: 50 INJECTION, SOLUTION INTRAMUSCULAR; INTRAVENOUS at 12:06

## 2022-06-07 RX ADMIN — PHENYLEPHRINE HYDROCHLORIDE 100 MCG: 10 INJECTION INTRAVENOUS at 01:06

## 2022-06-07 RX ADMIN — DEXAMETHASONE SODIUM PHOSPHATE 4 MG: 4 INJECTION, SOLUTION INTRAMUSCULAR; INTRAVENOUS at 12:06

## 2022-06-07 RX ADMIN — MIDAZOLAM HYDROCHLORIDE 2 MG: 1 INJECTION, SOLUTION INTRAMUSCULAR; INTRAVENOUS at 12:06

## 2022-06-07 NOTE — ANESTHESIA RELEASE NOTE
"Anesthesia Release from PACU Note    Patient: Marcos Elaine    Procedure(s) Performed: Procedure(s) (LRB):  HEMORRHOIDECTOMY (N/A)    Anesthesia type: general    Post pain: Adequate analgesia    Post assessment: no apparent anesthetic complications    Last Vitals:   Visit Vitals  BP (!) 165/89   Pulse 71   Temp 36.6 °C (97.9 °F)   Resp 16   Ht 6' 2" (1.88 m)   Wt 77.1 kg (170 lb)   SpO2 97%   BMI 21.83 kg/m²       Post vital signs: stable    Level of consciousness: awake    Nausea/Vomiting: no nausea/no vomiting    Complications: none    Airway Patency: patent    Respiratory: unassisted    Cardiovascular: stable and blood pressure at baseline    Hydration: euvolemic  "

## 2022-06-07 NOTE — PATIENT INSTRUCTIONS
Anal Surgery Post Op Instructions:    You had a hemorrhoidectomy performed today.     Wound care:  Expect some drainage over the next few weeks as the wound heals.  Please wear a pad to help with drainage.     You have no activity restrictions.   No dietary restrictions.    Medications:  A narcotic pain medication has been prescribed.  Please start to wean the pain medication over the following few days.  You can take tylenol instead of the pain medication.      Please take miralax 1 capful at night to prevent constipation associated with narcotic pain medications.      Follow up:  Return to clinic in 4 weeks for follow up and wound check.

## 2022-06-07 NOTE — BRIEF OP NOTE
Vinod Noble - Surgery ([facility])  Brief Operative Note    Surgery Date: 6/7/2022     Surgeon(s) and Role:     * Israel Hartley MD - Primary     * Syd Nickerson MD - Resident - Assisting        Pre-op Diagnosis:  Internal hemorrhoids with complication [K64.8]    Post-op Diagnosis:  Post-Op Diagnosis Codes:     * Internal hemorrhoids with complication [K64.8]    Procedure(s) (LRB):  HEMORRHOIDECTOMY (N/A)    Anesthesia: Choice    Operative Findings: Hemorrhoidectomy of Left Lateral Column    Estimated Blood Loss: Minimal         Specimens:   Specimen (24h ago, onward)             Start     Ordered    06/07/22 1330  Specimen to Pathology, Surgery Other  Once        Comments: Pre-op Diagnosis: Internal hemorrhoids with complication [K64.8]Procedure(s):HEMORRHOIDECTOMY Number of specimens: 1Name of specimens: 1. Left lateral hemorrhoid- permanent     References:    Click here for ordering Quick Tip   Question Answer Comment   Procedure Type: Other    Specimen Class: Routine/Screening    Which provider would you like to cc? ISRAEL HARTLEY    Release to patient Immediate        06/07/22 1329                  Discharge Note    OUTCOME: Patient tolerated treatment/procedure well without complication and is now ready for discharge.    DISPOSITION: Home or Self Care    FINAL DIAGNOSIS:  Internal hemorrhoids with complication [K64.8]      FOLLOWUP: In clinic    DISCHARGE INSTRUCTIONS:    Discharge Procedure Orders   Diet general   Order Comments: You have no diet restrictions from surgery. Recommend drinking 8-10 glasses of fluids a day and consuming a high fiber diet with fruits, vegetables, beans, and whole grains.     Wound care routine (specify)   Order Comments: A small amount of drainage is normal. This may be blood or blood tinged, it will improve with time. Recommend using a pad or dry guaze for any drainage. This may be changed as needed for bathing or saturation.     You may have packing within the anus; this  will pass with your first bowel movement. Keep wound clean after bowel movements. May shower or use sitz baths (sit in a tub with 2-3 inches of warm water for 10-15 minutes) as needed.     Do not apply creams or ointments unless instructed to. Do not put anything in your rectum, such as suppositories or enemas.     Call MD for:  severe uncontrolled pain   Order Comments: Anal surgery can be very painful. It is normal and very common. It is important to take medication as prescribed to prevent your pain from being uncontrolled.      For Pain:   Non-narcotic pain medication has been prescribed and should be taken for baseline pain control. This includes medication like Acetaminophen (Tylenol), Ibuprofen (Motrin), and Gabapentin (Neurontin). These medications are effective and without the side effects of constipation or addiction. All three medications can be taken together, but we recommend taking Acetaminophen (Tylenol) and Ibuprofen (Motrin) at different times (for example 4 hours apart).     Narcotic pain medication has also been prescribed for breakthrough pain control. This includes medication like Oxycodone (OxyContin) or Hydromorphone (Dilaudid), which can be taken every 4-6 hours. Narcotic medication is safe for short periods of time but can cause drowsiness - do not drive or operate heavy machinery that may injury you or others while using.     Ibuprofen, Oxycodone, and Hydromorphone should be taken with food.     You have also been prescribed a medication (such as Psyllium (Metamucil) and/or Polyethylene Glycol (Miralax) to prevent constipation, which is common after anal surgery. This can also be worsened by narcotic pain medication. Please take as prescribed. Do not strain or sit on the toilet for long periods of time. Pain with and after bowel movements is normal.     Activity as tolerated   Order Comments: You have no restrictions. Walking and normal activity is strongly recommended. This can also help  to prevent constipation.

## 2022-06-07 NOTE — ANESTHESIA PREPROCEDURE EVALUATION
06/07/2022  Pre-operative evaluation for Procedure(s) (LRB):  HEMORRHOIDECTOMY (N/A)    Marcos Elaine is a 66 y.o. male w/ PMhx of CHB s/p PPM, GERD, HLD    Patient Active Problem List   Diagnosis    CHB (complete heart block)    Hyperlipidemia    Presence of cardiac resynchronization therapy pacemaker (CRT-P)    Special screening for malignant neoplasms, colon    Personal history of colonic polyps    History of diverticulitis    Gastroesophageal reflux disease       Review of patient's allergies indicates:  No Known Allergies    Current Facility-Administered Medications on File Prior to Encounter   Medication Dose Route Frequency Provider Last Rate Last Admin    0.9%  NaCl infusion   Intravenous Continuous Elsa Young, NP   1,000 mL at 10/14/19 1000     Current Outpatient Medications on File Prior to Encounter   Medication Sig Dispense Refill    glucosamine-chondroitin 500-400 mg tablet Take 1 tablet by mouth 3 (three) times daily.      multivitamin capsule Take 1 capsule by mouth once daily.      simvastatin (ZOCOR) 40 MG tablet Take 1 tablet (40 mg total) by mouth once daily. (Patient taking differently: Take 40 mg by mouth every evening.) 90 tablet 4    AFLURIA QUAD 2019-20,6MO UP, 60 mcg (15 mcg x 4)/0.5 mL Susp INTO MUSCLE  0    CALCIUM POLYCARBOPHIL (KONSYL FIBER ORAL) Take by mouth.      diazePAM (VALIUM) 5 MG tablet Take 5 mg by mouth every 12 (twelve) hours as needed.      fluticasone propionate (FLONASE) 50 mcg/actuation nasal spray 1 spray (50 mcg total) by Each Nostril route once daily. 16 g 2       Past Surgical History:   Procedure Laterality Date    HERNIA REPAIR      IMPLANTATION OF BIVENTRICULAR PERMANENT PACEMAKER AS UPGRADE TO EXISTING PACEMAKER N/A 10/14/2019    Procedure: Biventricular pacemaker upgrade;  Surgeon: Jarod Hanson MD;  Location: [facility]  LAB;  Service: Cardiology;  Laterality: N/A;  NICM, CRT-P upgrade, QINGM, MAC, MB, 3 Prep *SJM PPM in situ*    inguinal hernia repair      x 2    INSERT / REPLACE / REMOVE PACEMAKER  2013    S/p DC PM/ replace w/Biventricular pacemaker upgrade 10/14/19    SHOULDER SURGERY Left        Social History     Socioeconomic History    Marital status:    Tobacco Use    Smoking status: Former Smoker     Types: Cigars     Quit date: 1973     Years since quittin.9    Smokeless tobacco: Never Used   Substance and Sexual Activity    Alcohol use: Yes     Alcohol/week: 7.0 standard drinks     Types: 5 Glasses of wine, 2 Cans of beer per week     Comment: Daily    Drug use: No     Social Determinants of Health     Financial Resource Strain: Low Risk     Difficulty of Paying Living Expenses: Not hard at all   Food Insecurity: No Food Insecurity    Worried About Running Out of Food in the Last Year: Never true    Ran Out of Food in the Last Year: Never true   Transportation Needs: No Transportation Needs    Lack of Transportation (Medical): No    Lack of Transportation (Non-Medical): No   Physical Activity: Inactive    Days of Exercise per Week: 0 days    Minutes of Exercise per Session: 0 min   Stress: No Stress Concern Present    Feeling of Stress : Not at all   Social Connections: Unknown    Frequency of Communication with Friends and Family: More than three times a week    Frequency of Social Gatherings with Friends and Family: More than three times a week    Active Member of Clubs or Organizations: Yes    Attends Club or Organization Meetings: More than 4 times per year    Marital Status:    Housing Stability: Low Risk     Unable to Pay for Housing in the Last Year: No    Number of Places Lived in the Last Year: 1    Unstable Housing in the Last Year: No         CBC: No results for input(s): WBC, RBC, HGB, HCT, PLT, MCV, MCH, MCHC in the last 72 hours.    CMP: No results for  input(s): NA, K, CL, CO2, BUN, CREATININE, GLU, MG, PHOS, CALCIUM, ALBUMIN, PROT, ALKPHOS, ALT, AST, BILITOT in the last 72 hours.    INR  No results for input(s): PT, INR, PROTIME, APTT in the last 72 hours.        Diagnostic Studies:      EKD Echo:  Results for orders placed or performed during the hospital encounter of 18   2D echo with color flow doppler   Result Value Ref Range    EF + QEF 50 55 - 65    Mitral Valve Regurgitation MILD     Aortic Valve Regurgitation TRIVIAL     Tricuspid Valve Regurgitation TRIVIAL      TTE 2021  · The left ventricle is normal in size with concentric remodeling and normal systolic function. The estimated ejection fraction is 58%.  · Normal left ventricular diastolic function.  · Normal right ventricular size with normal right ventricular systolic function.  · Intermediate central venous pressure (8 mmHg).  · The estimated PA systolic pressure is 30 mmHg.           Pre-op Assessment    I have reviewed the Patient Summary Reports.     I have reviewed the Nursing Notes. I have reviewed the NPO Status.   I have reviewed the Medications.     Review of Systems  Cardiovascular:   Dysrhythmias    Hepatic/GI:   GERD        Physical Exam  General: Well nourished and Cooperative    Airway:  Mallampati: II   Mouth Opening: Normal  TM Distance: Normal  Tongue: Normal  Neck ROM: Normal ROM    Chest/Lungs:  Clear to auscultation, Normal Respiratory Rate    Heart:  Rate: Normal  Rhythm: Regular Rhythm  Sounds: Normal        Anesthesia Plan  Type of Anesthesia, risks & benefits discussed:    Anesthesia Type: Gen ETT  Intra-op Monitoring Plan: Standard ASA Monitors  Post Op Pain Control Plan: multimodal analgesia and IV/PO Opioids PRN  Induction:  IV  Airway Plan: Direct and Video, Post-Induction  Informed Consent: Informed consent signed with the Patient and all parties understand the risks and agree with anesthesia plan.  All questions answered.   ASA Score: 2    Ready For  Surgery From Anesthesia Perspective.     .

## 2022-06-07 NOTE — PLAN OF CARE
Patient states ready for discharge. Pain assessed and acceptable at this time. No complaints of nausea. Discharge instructions reviewed.

## 2022-06-07 NOTE — H&P
June 7  Subjective:       Patient ID: Marcos Elaine is a 66 y.o. male.     Chief Complaint: Rectal Pain and Rectal Bleeding     HPI  .  New patient.  Had previous rubber-band ligation and now has recurrent symptoms with tissue protruding.  No manual reduction.  Some bleeding with wiping.  No pain with bowel movements.  Denies constipation.  Last colonoscopy 2013.        Review of Systems   Constitutional: Negative for chills and fever.   Respiratory: Negative for cough and shortness of breath.    Cardiovascular: Negative for chest pain and palpitations.   Gastrointestinal: Negative for nausea and vomiting.   Genitourinary: Negative for dysuria and urgency.   Neurological: Negative for seizures and numbness.          Objective:   Physical Exam  Constitutional:       Appearance: He is well-developed.   Eyes:      Conjunctiva/sclera: Conjunctivae normal.   Pulmonary:      Effort: Pulmonary effort is normal. No respiratory distress.   Genitourinary:     Comments: Anorectal Exam:     Perianal skin: normal.  Visible prolapsing internal hemorrhoid posterolateral.    AFNNY: Normal resting and squeeze tone.  No masses. Nontender.    Anoscopy:  Normal distal rectal mucosa. Internal hemorrhoid left posterolateral with nodular protrusion.  This extends down into the external portion of that column.    Musculoskeletal:         General: Normal range of motion.   Skin:     General: Skin is warm and dry.   Neurological:      Mental Status: He is alert and oriented to person, place, and time.        Assessment:       Problem List Items Addressed This Visit    None              Visit Diagnoses      Internal hemorrhoids with complication    -  Primary           Plan:       Excisional hemorrhoidectomy.  June 7

## 2022-06-07 NOTE — OP NOTE
MARCOS LOGAN  [id number]  [id number]    OPERATIVE NOTE:    DATE OF OPERATION: 06/07/2022    PREOPERATIVE DIAGNOSIS:  Internal hemorrhoids with bleeding    POSTOPERATIVE DIAGNOSIS:  Internal hemorrhoids with bleeding    PROCEDURE PERFORMED:  Hemorrhoidectomy, single column, internal and external.    ATTENDING SURGEON: Israel Hartley MD    RESIDENT/FELLOW SURGEON:  Syd Nickerson MD    ANESTHESIA:  General endotracheal    ESTIMATED BLOOD LOSS: Juan than 20 mL    FINDINGS:  None    SPECIMENS:   1. Left lateral hemorrhoid    COMPLICATIONS:  None    DISPOSITION: PACU    CONDITION: good    INDICATION FOR PROCEDURE:  Marcos Logan is a 66 y.o. male with bleeding hemorrhoids which have failed treatment with rubber band ligation.  On inspection he has a polypoid component to the left lateral hemorrhoid that bleeds fairly easily but he is part of an internal external column not amenable to office based treatment.    DESCRIPTION OF PROCEDURE:   After obtaining informed consent the patient was taken the operating room and placed in the supine position.  He underwent general endotracheal anesthesia and then was placed prone luis carlos-knife. He was then prepped and draped sterile manner.  A preoperative time-out was performed.     Inspection and anoscopy revealedleft lateral combined internal-external hemorrhoid - there was a friable polypoid elevation on the internal component.  The remaining internal hemorrhoids were minimal and without stigmata of bleeding or prolapse.   A standard elliptical excisional hemorrhoidectomy was performed.  A 3 0 chromic was placed at the apex of the hemorrhoid and secured with a figure-of-eight.  Using electrocautery the hemorrhoid was excised in elliptical fashion taking care to dissect the hemorrhoid tissue off of the underlying internal sphincter. Once the hemorrhoid was completely excised mucocutaneous closure was performed using a running 3 0 chromic.  Anal block with 0.25% Marcaine was  performed.  Hemostasis was ensured and the anal canal was packed with Gelfoam.     The patient tolerated the procedure and was taken to the recovery room in stable condition.  he is discharged home with instructions for wound care, diet, and activity.  Follow-up in 1 month      Israel Hartley MD  , Colon & Rectal Surgery

## 2022-06-07 NOTE — ANESTHESIA POSTPROCEDURE EVALUATION
Anesthesia Post Evaluation    Patient: Marcos Elaine    Procedure(s) Performed: Procedure(s) (LRB):  HEMORRHOIDECTOMY (N/A)    Final Anesthesia Type: general      Patient location during evaluation: PACU  Patient participation: Yes- Able to Participate  Level of consciousness: awake and alert  Post-procedure vital signs: reviewed and stable  Pain management: adequate  Airway patency: patent  RUPA mitigation strategies: Multimodal analgesia  PONV status at discharge: No PONV  Anesthetic complications: no      Cardiovascular status: blood pressure returned to baseline and hemodynamically stable  Respiratory status: unassisted  Hydration status: euvolemic  Follow-up not needed.          Vitals Value Taken Time   /84 06/07/22 1401   Temp 36.4 °C (97.5 °F) 06/07/22 1336   Pulse 61 06/07/22 1402   Resp 22 06/07/22 1402   SpO2 97 % 06/07/22 1402   Vitals shown include unvalidated device data.      No case tracking events are documented in the log.      Pain/Amaya Score: Amaya Score: 8 (6/7/2022  1:36 PM)

## 2022-06-07 NOTE — ANESTHESIA PROCEDURE NOTES
Intubation    Date/Time: 6/7/2022 12:47 PM  Performed by: Arcelia Drummond MD  Authorized by: Arcelia Drummond MD     Intubation:     Induction:  Intravenous    Intubated:  Postinduction    Mask Ventilation:  Easy mask    Attempts:  1    Attempted By:  Staff anesthesiologist    Method of Intubation:  Direct    Blade:  Yoel 4    Laryngeal View Grade: Grade I - full view of cords      Difficult Airway Encountered?: No      Complications:  None    Airway Device:  Oral endotracheal tube    Airway Device Size:  7.0    Style/Cuff Inflation:  Cuffed    Inflation Amount (mL):  6    Tube secured:  22    Placement Verified By:  Capnometry    Complicating Factors:  None    Findings Post-Intubation:  Atraumatic/condition of teeth unchanged and BS equal bilateral

## 2022-06-13 LAB
FINAL PATHOLOGIC DIAGNOSIS: NORMAL
Lab: NORMAL

## 2022-06-28 ENCOUNTER — OFFICE VISIT (OUTPATIENT)
Dept: OPTOMETRY | Facility: CLINIC | Age: 67
End: 2022-06-28
Payer: MEDICARE

## 2022-06-28 DIAGNOSIS — H25.13 NUCLEAR SCLEROSIS, BILATERAL: Primary | ICD-10-CM

## 2022-06-28 DIAGNOSIS — H40.013 OAG (OPEN ANGLE GLAUCOMA) SUSPECT, LOW RISK, BILATERAL: ICD-10-CM

## 2022-06-28 PROCEDURE — 92014 COMPRE OPH EXAM EST PT 1/>: CPT | Mod: S$PBB,,, | Performed by: OPTOMETRIST

## 2022-06-28 PROCEDURE — 92133 OCT, OPTIC NERVE - OU - BOTH EYES: ICD-10-PCS | Mod: 26,S$PBB,, | Performed by: OPTOMETRIST

## 2022-06-28 PROCEDURE — 99213 OFFICE O/P EST LOW 20 MIN: CPT | Mod: PBBFAC,PO | Performed by: OPTOMETRIST

## 2022-06-28 PROCEDURE — 92014 PR EYE EXAM, EST PATIENT,COMPREHESV: ICD-10-PCS | Mod: S$PBB,,, | Performed by: OPTOMETRIST

## 2022-06-28 PROCEDURE — 99999 PR PBB SHADOW E&M-EST. PATIENT-LVL III: ICD-10-PCS | Mod: PBBFAC,,, | Performed by: OPTOMETRIST

## 2022-06-28 PROCEDURE — 99999 PR PBB SHADOW E&M-EST. PATIENT-LVL III: CPT | Mod: PBBFAC,,, | Performed by: OPTOMETRIST

## 2022-06-28 PROCEDURE — 92133 CPTRZD OPH DX IMG PST SGM ON: CPT | Mod: PBBFAC,PO | Performed by: OPTOMETRIST

## 2022-06-28 NOTE — PROGRESS NOTES
HPI     Pt here for annual eye exam.  Pt states no complaints at this time.    No tearing  No burning  No itching  No ha's  No flashes   No floaters    Eye meds: NA    Last edited by Robel Cannon on 6/28/2022  8:12 AM. (History)            Assessment /Plan     For exam results, see Encounter Report.    Nuclear sclerosis, bilateral  -Educated patient on presence of cataracts at today's exam, monitor at annual dilated fundus exam. 8+ years surgical estimate.    OAG (open angle glaucoma) suspect, low risk, bilateral  -     OCT, Optic Nerve - OU - Both Eyes  -Slight elongation OD, [place] on OCT  -monitor as a low risk  -HVF, OCT at annual    RTC 1 yr

## 2022-06-30 ENCOUNTER — CLINICAL SUPPORT (OUTPATIENT)
Dept: CARDIOLOGY | Facility: HOSPITAL | Age: 67
End: 2022-06-30
Payer: MEDICARE

## 2022-06-30 DIAGNOSIS — I44.2 ATRIOVENTRICULAR BLOCK, COMPLETE: ICD-10-CM

## 2022-06-30 DIAGNOSIS — Z95.0 PRESENCE OF CARDIAC PACEMAKER: ICD-10-CM

## 2022-06-30 PROCEDURE — 93294 CARDIAC DEVICE CHECK - REMOTE: ICD-10-PCS | Mod: ,,, | Performed by: INTERNAL MEDICINE

## 2022-06-30 PROCEDURE — 93294 REM INTERROG EVL PM/LDLS PM: CPT | Mod: ,,, | Performed by: INTERNAL MEDICINE

## 2022-07-08 ENCOUNTER — OFFICE VISIT (OUTPATIENT)
Dept: SURGERY | Facility: CLINIC | Age: 67
End: 2022-07-08
Payer: MEDICARE

## 2022-07-08 VITALS
BODY MASS INDEX: 21.14 KG/M2 | WEIGHT: 164.69 LBS | SYSTOLIC BLOOD PRESSURE: 148 MMHG | HEIGHT: 74 IN | DIASTOLIC BLOOD PRESSURE: 72 MMHG | HEART RATE: 66 BPM

## 2022-07-08 DIAGNOSIS — Z09 POSTOP CHECK: ICD-10-CM

## 2022-07-08 DIAGNOSIS — K64.8 INTERNAL HEMORRHOIDS WITH COMPLICATION: Primary | ICD-10-CM

## 2022-07-08 PROCEDURE — 99024 POSTOP FOLLOW-UP VISIT: CPT | Mod: POP,,, | Performed by: COLON & RECTAL SURGERY

## 2022-07-08 PROCEDURE — 99213 OFFICE O/P EST LOW 20 MIN: CPT | Mod: PBBFAC | Performed by: COLON & RECTAL SURGERY

## 2022-07-08 PROCEDURE — 99999 PR PBB SHADOW E&M-EST. PATIENT-LVL III: CPT | Mod: PBBFAC,,, | Performed by: COLON & RECTAL SURGERY

## 2022-07-08 PROCEDURE — 99999 PR PBB SHADOW E&M-EST. PATIENT-LVL III: ICD-10-PCS | Mod: PBBFAC,,, | Performed by: COLON & RECTAL SURGERY

## 2022-07-08 PROCEDURE — 99024 PR POST-OP FOLLOW-UP VISIT: ICD-10-PCS | Mod: POP,,, | Performed by: COLON & RECTAL SURGERY

## 2022-07-08 NOTE — PROGRESS NOTES
June 7  Subjective:       Patient ID: Marcos Elaine is a 66 y.o. male.    Chief Complaint: No chief complaint on file.    HPI  .  New patient.  Had previous rubber-band ligation and now has recurrent symptoms with tissue protruding.  No manual reduction.  Some bleeding with wiping.  No pain with bowel movements.  Denies constipation.  Last colonoscopy 2013.    Interval History: Underwent hemorrhoidectomy on 6/7/22. Had pain for ~1 week, now feels greatly improved. Denies pain, protrusion of tissue, or bleeding.        Review of Systems   Constitutional: Negative for chills and fever.   Respiratory: Negative for cough and shortness of breath.    Cardiovascular: Negative for chest pain and palpitations.   Gastrointestinal: Negative for nausea and vomiting.   Genitourinary: Negative for dysuria and urgency.   Neurological: Negative for seizures and numbness.         Objective:      Physical Exam  Constitutional:       Appearance: He is well-developed.   Eyes:      Conjunctiva/sclera: Conjunctivae normal.   Pulmonary:      Effort: Pulmonary effort is normal. No respiratory distress.   Genitourinary:     Comments: Anorectal Exam:     Perianal skin: normal.  Well healed incision.    FANNY: Normal resting and squeeze tone.  No masses. Nontender.    Musculoskeletal:         General: Normal range of motion.   Skin:     General: Skin is warm and dry.   Neurological:      Mental Status: He is alert and oriented to person, place, and time.         Assessment:       Problem List Items Addressed This Visit    None         Plan:       66M s/p excisional hemorrhoidectomy, recovered well.  -Educated on continued importance of keeping bowel function regular (high fiber, high fluid, stool softeners as needed)  -Follow up as needed  -Due for colonoscopy in December 2023

## 2022-09-28 ENCOUNTER — CLINICAL SUPPORT (OUTPATIENT)
Dept: CARDIOLOGY | Facility: HOSPITAL | Age: 67
End: 2022-09-28
Payer: MEDICARE

## 2022-09-28 DIAGNOSIS — I44.2 ATRIOVENTRICULAR BLOCK, COMPLETE: ICD-10-CM

## 2022-09-28 DIAGNOSIS — Z95.0 PRESENCE OF CARDIAC PACEMAKER: ICD-10-CM

## 2022-09-28 PROCEDURE — 93296 REM INTERROG EVL PM/IDS: CPT | Performed by: INTERNAL MEDICINE

## 2022-10-10 ENCOUNTER — TELEPHONE (OUTPATIENT)
Dept: CARDIOLOGY | Facility: HOSPITAL | Age: 67
End: 2022-10-10
Payer: COMMERCIAL

## 2022-10-10 DIAGNOSIS — Z95.0 PRESENCE OF CARDIAC RESYNCHRONIZATION THERAPY PACEMAKER (CRT-P): Primary | ICD-10-CM

## 2022-10-10 NOTE — TELEPHONE ENCOUNTER
Remote transmission received for LV impedance > 3000 ohms x 3 readings.  Unknown LV autocapture also.  Will bring in to clinic to assess LV lead.    CHB, EF 45%--> CRT-P upgrade in 2019 --> EF 58% on 2/18/21    Pt scheduled for device check on 10/11/22

## 2022-10-11 ENCOUNTER — CLINICAL SUPPORT (OUTPATIENT)
Dept: CARDIOLOGY | Facility: HOSPITAL | Age: 67
End: 2022-10-11
Attending: INTERNAL MEDICINE
Payer: MEDICARE

## 2022-10-11 ENCOUNTER — HOSPITAL ENCOUNTER (OUTPATIENT)
Dept: RADIOLOGY | Facility: HOSPITAL | Age: 67
Discharge: HOME | End: 2022-10-11
Attending: INTERNAL MEDICINE
Payer: MEDICARE

## 2022-10-11 DIAGNOSIS — I44.2 CHB (COMPLETE HEART BLOCK): ICD-10-CM

## 2022-10-11 DIAGNOSIS — Z95.0 PRESENCE OF CARDIAC RESYNCHRONIZATION THERAPY PACEMAKER (CRT-P): ICD-10-CM

## 2022-10-11 DIAGNOSIS — Z95.0 CARDIAC PACEMAKER IN SITU: ICD-10-CM

## 2022-10-11 PROCEDURE — 71046 XR CHEST PA AND LATERAL: ICD-10-PCS | Mod: 26,,, | Performed by: RADIOLOGY

## 2022-10-11 PROCEDURE — 93281 PM DEVICE PROGR EVAL MULTI: CPT

## 2022-10-11 PROCEDURE — 71046 X-RAY EXAM CHEST 2 VIEWS: CPT | Mod: TC,FY

## 2022-10-11 PROCEDURE — 71046 X-RAY EXAM CHEST 2 VIEWS: CPT | Mod: 26,,, | Performed by: RADIOLOGY

## 2022-12-08 ENCOUNTER — PATIENT MESSAGE (OUTPATIENT)
Dept: FAMILY MEDICINE | Facility: CLINIC | Age: 67
End: 2022-12-08
Payer: COMMERCIAL

## 2022-12-08 DIAGNOSIS — E78.5 HYPERLIPIDEMIA, UNSPECIFIED HYPERLIPIDEMIA TYPE: ICD-10-CM

## 2022-12-08 DIAGNOSIS — R73.9 HYPERGLYCEMIA: ICD-10-CM

## 2022-12-08 DIAGNOSIS — N40.0 BENIGN PROSTATIC HYPERPLASIA, UNSPECIFIED WHETHER LOWER URINARY TRACT SYMPTOMS PRESENT: Primary | ICD-10-CM

## 2022-12-12 ENCOUNTER — TELEPHONE (OUTPATIENT)
Dept: FAMILY MEDICINE | Facility: CLINIC | Age: 67
End: 2022-12-12
Payer: COMMERCIAL

## 2022-12-12 ENCOUNTER — LAB VISIT (OUTPATIENT)
Dept: LAB | Facility: HOSPITAL | Age: 67
End: 2022-12-12
Attending: INTERNAL MEDICINE
Payer: MEDICARE

## 2022-12-12 DIAGNOSIS — N40.0 BENIGN PROSTATIC HYPERPLASIA, UNSPECIFIED WHETHER LOWER URINARY TRACT SYMPTOMS PRESENT: ICD-10-CM

## 2022-12-12 DIAGNOSIS — R73.9 HYPERGLYCEMIA: ICD-10-CM

## 2022-12-12 DIAGNOSIS — E78.5 HYPERLIPIDEMIA, UNSPECIFIED HYPERLIPIDEMIA TYPE: ICD-10-CM

## 2022-12-12 LAB
ALBUMIN SERPL BCP-MCNC: 3.9 G/DL (ref 3.5–5.2)
ALP SERPL-CCNC: 75 U/L (ref 55–135)
ALT SERPL W/O P-5'-P-CCNC: 21 U/L (ref 10–44)
ANION GAP SERPL CALC-SCNC: 8 MMOL/L (ref 8–16)
AST SERPL-CCNC: 21 U/L (ref 10–40)
BASOPHILS # BLD AUTO: 0.02 K/UL (ref 0–0.2)
BASOPHILS NFR BLD: 0.5 % (ref 0–1.9)
BILIRUB SERPL-MCNC: 1 MG/DL (ref 0.1–1)
BUN SERPL-MCNC: 13 MG/DL (ref 8–23)
CALCIUM SERPL-MCNC: 9.4 MG/DL (ref 8.7–10.5)
CHLORIDE SERPL-SCNC: 106 MMOL/L (ref 95–110)
CHOLEST SERPL-MCNC: 162 MG/DL (ref 120–199)
CHOLEST/HDLC SERPL: 2.3 {RATIO} (ref 2–5)
CO2 SERPL-SCNC: 27 MMOL/L (ref 23–29)
COMPLEXED PSA SERPL-MCNC: 2.7 NG/ML (ref 0–4)
CREAT SERPL-MCNC: 0.8 MG/DL (ref 0.5–1.4)
DIFFERENTIAL METHOD: NORMAL
EOSINOPHIL # BLD AUTO: 0.2 K/UL (ref 0–0.5)
EOSINOPHIL NFR BLD: 3.6 % (ref 0–8)
ERYTHROCYTE [DISTWIDTH] IN BLOOD BY AUTOMATED COUNT: 13.3 % (ref 11.5–14.5)
EST. GFR  (NO RACE VARIABLE): >60 ML/MIN/1.73 M^2
GLUCOSE SERPL-MCNC: 95 MG/DL (ref 70–110)
HCT VFR BLD AUTO: 45.4 % (ref 40–54)
HDLC SERPL-MCNC: 69 MG/DL (ref 40–75)
HDLC SERPL: 42.6 % (ref 20–50)
HGB BLD-MCNC: 14.7 G/DL (ref 14–18)
IMM GRANULOCYTES # BLD AUTO: 0.01 K/UL (ref 0–0.04)
IMM GRANULOCYTES NFR BLD AUTO: 0.2 % (ref 0–0.5)
LDLC SERPL CALC-MCNC: 79.8 MG/DL (ref 63–159)
LYMPHOCYTES # BLD AUTO: 1.2 K/UL (ref 1–4.8)
LYMPHOCYTES NFR BLD: 28.1 % (ref 18–48)
MCH RBC QN AUTO: 30.1 PG (ref 27–31)
MCHC RBC AUTO-ENTMCNC: 32.4 G/DL (ref 32–36)
MCV RBC AUTO: 93 FL (ref 82–98)
MONOCYTES # BLD AUTO: 0.4 K/UL (ref 0.3–1)
MONOCYTES NFR BLD: 9.4 % (ref 4–15)
NEUTROPHILS # BLD AUTO: 2.4 K/UL (ref 1.8–7.7)
NEUTROPHILS NFR BLD: 58.2 % (ref 38–73)
NONHDLC SERPL-MCNC: 93 MG/DL
NRBC BLD-RTO: 0 /100 WBC
PLATELET # BLD AUTO: 217 K/UL (ref 150–450)
PMV BLD AUTO: 12 FL (ref 9.2–12.9)
POTASSIUM SERPL-SCNC: 4.3 MMOL/L (ref 3.5–5.1)
PROT SERPL-MCNC: 6.8 G/DL (ref 6–8.4)
RBC # BLD AUTO: 4.89 M/UL (ref 4.6–6.2)
SODIUM SERPL-SCNC: 141 MMOL/L (ref 136–145)
TRIGL SERPL-MCNC: 66 MG/DL (ref 30–150)
TSH SERPL DL<=0.005 MIU/L-ACNC: 0.92 UIU/ML (ref 0.4–4)
WBC # BLD AUTO: 4.13 K/UL (ref 3.9–12.7)

## 2022-12-12 PROCEDURE — 80061 LIPID PANEL: CPT | Performed by: INTERNAL MEDICINE

## 2022-12-12 PROCEDURE — 85025 COMPLETE CBC W/AUTO DIFF WBC: CPT | Performed by: INTERNAL MEDICINE

## 2022-12-12 PROCEDURE — 36415 COLL VENOUS BLD VENIPUNCTURE: CPT | Mod: PO | Performed by: INTERNAL MEDICINE

## 2022-12-12 PROCEDURE — 80053 COMPREHEN METABOLIC PANEL: CPT | Performed by: INTERNAL MEDICINE

## 2022-12-12 PROCEDURE — 84153 ASSAY OF PSA TOTAL: CPT | Performed by: INTERNAL MEDICINE

## 2022-12-12 PROCEDURE — 84443 ASSAY THYROID STIM HORMONE: CPT | Performed by: INTERNAL MEDICINE

## 2022-12-13 ENCOUNTER — PATIENT MESSAGE (OUTPATIENT)
Dept: ADMINISTRATIVE | Facility: HOSPITAL | Age: 67
End: 2022-12-13
Payer: COMMERCIAL

## 2022-12-13 ENCOUNTER — OFFICE VISIT (OUTPATIENT)
Dept: FAMILY MEDICINE | Facility: CLINIC | Age: 67
End: 2022-12-13
Payer: MEDICARE

## 2022-12-13 ENCOUNTER — PATIENT OUTREACH (OUTPATIENT)
Dept: ADMINISTRATIVE | Facility: HOSPITAL | Age: 67
End: 2022-12-13
Payer: COMMERCIAL

## 2022-12-13 VITALS
HEIGHT: 74 IN | BODY MASS INDEX: 20.86 KG/M2 | TEMPERATURE: 98 F | OXYGEN SATURATION: 98 % | SYSTOLIC BLOOD PRESSURE: 128 MMHG | HEART RATE: 65 BPM | DIASTOLIC BLOOD PRESSURE: 74 MMHG | WEIGHT: 162.5 LBS

## 2022-12-13 DIAGNOSIS — Z12.5 SCREENING FOR PROSTATE CANCER: ICD-10-CM

## 2022-12-13 DIAGNOSIS — Z95.0 CARDIAC PACEMAKER: ICD-10-CM

## 2022-12-13 DIAGNOSIS — E78.5 HYPERLIPIDEMIA, UNSPECIFIED HYPERLIPIDEMIA TYPE: Primary | ICD-10-CM

## 2022-12-13 DIAGNOSIS — Z86.010 HISTORY OF COLONIC POLYPS: ICD-10-CM

## 2022-12-13 DIAGNOSIS — Z00.00 ROUTINE MEDICAL EXAM: ICD-10-CM

## 2022-12-13 DIAGNOSIS — Z12.11 SCREENING FOR COLORECTAL CANCER: ICD-10-CM

## 2022-12-13 DIAGNOSIS — I44.1 SECOND DEGREE AV BLOCK: ICD-10-CM

## 2022-12-13 DIAGNOSIS — I44.2 CHB (COMPLETE HEART BLOCK): ICD-10-CM

## 2022-12-13 DIAGNOSIS — R73.9 HYPERGLYCEMIA: ICD-10-CM

## 2022-12-13 DIAGNOSIS — Z12.12 SCREENING FOR COLORECTAL CANCER: ICD-10-CM

## 2022-12-13 DIAGNOSIS — Z87.19 HISTORY OF DIVERTICULITIS: ICD-10-CM

## 2022-12-13 DIAGNOSIS — N40.0 BENIGN PROSTATIC HYPERPLASIA, UNSPECIFIED WHETHER LOWER URINARY TRACT SYMPTOMS PRESENT: ICD-10-CM

## 2022-12-13 PROCEDURE — 99214 OFFICE O/P EST MOD 30 MIN: CPT | Mod: PBBFAC,PO | Performed by: INTERNAL MEDICINE

## 2022-12-13 PROCEDURE — 99999 PR PBB SHADOW E&M-EST. PATIENT-LVL IV: CPT | Mod: PBBFAC,,, | Performed by: INTERNAL MEDICINE

## 2022-12-13 PROCEDURE — 99215 PR OFFICE/OUTPT VISIT, EST, LEVL V, 40-54 MIN: ICD-10-PCS | Mod: S$PBB,,, | Performed by: INTERNAL MEDICINE

## 2022-12-13 PROCEDURE — 99215 OFFICE O/P EST HI 40 MIN: CPT | Mod: S$PBB,,, | Performed by: INTERNAL MEDICINE

## 2022-12-13 PROCEDURE — 99999 PR PBB SHADOW E&M-EST. PATIENT-LVL IV: ICD-10-PCS | Mod: PBBFAC,,, | Performed by: INTERNAL MEDICINE

## 2022-12-13 NOTE — PROGRESS NOTES
Chief complaint: Physical exam,     67-year-old white male.  From and health maintenance standpoint we reviewed his  labs.  His colonoscopy was normal in 2013 with a 10 year follow-up since previous polyps were hyperplastic.  Labs reviewed and unremarkable.  He is feeling good.    ROS:   CONST: weight stable. EYES: no vision change. ENT: no sore throat. CV: no chest pain w/ exertion. RESP: no shortness of breath. GI: no nausea, vomiting, diarrhea. No dysphagia. : no urinary issues. MUSCULOSKELETAL: no new myalgias or arthralgias. SKIN: no new changes. NEURO: no focal deficits. PSYCH: no new issues. ENDOCRINE: no polyuria. HEME: no lymph nodes. ALLERGY: no general pruritis.          Past Medical History:   Diagnosis Date    2:1 Second degree AV block 03/05/2013    Cardiac pacemaker 03/06/2013    [facility]    Heart block 03/06/2013    S/p DC PM    Hyperlipidemia     Personal history of colonic polyps 12/30/2013    Normal 12/13 ---Repeat colonoscopy in 10 years for screening purposes. (Previous polyps were both hyperplastic)   Diverticulitis  Occasional GERD          Past Surgical History:   Procedure Laterality Date    EXCISIONAL HEMORRHOIDECTOMY N/A 6/7/2022    Procedure: HEMORRHOIDECTOMY;  Surgeon: Israel Hartley MD;  Location: [facility] OR [address] [place] [address];  Service: Colon and Rectal;  Laterality: N/A;    HERNIA REPAIR      IMPLANTATION OF BIVENTRICULAR PERMANENT PACEMAKER AS UPGRADE TO EXISTING PACEMAKER N/A 10/14/2019    Procedure: Biventricular pacemaker upgrade;  Surgeon: Jarod Hanson MD;  Location: [facility] EP LAB;  Service: Cardiology;  Laterality: N/A;  NICM, CRT-P upgrade, ANDREW, MAC, MB, 3 Prep *SJM PPM in situ*    inguinal hernia repair      x 2    INSERT / REPLACE / REMOVE PACEMAKER  03/06/2013    S/p DC PM/ replace w/Biventricular pacemaker upgrade 10/14/19    SHOULDER SURGERY Left        Social History     Social History    Marital status:      Spouse name: N/A    Number of children: 3 boys in  college    Years of education: N/A     Occupational History    realator     Social History Main Topics    Smoking status: Former Smoker     Types: Cigars     Quit date: 7/23/1973    Smokeless tobacco: Never Used    Alcohol use 6.0 oz/week     5 Glasses of wine, 5 Cans of beer per week      Comment: Daily    Drug use: No    Sexual activity: Not on file     Other Topics Concern    Not on file     Social History Narrative       family history includes Cancer in his father and mother; Diabetes in his mother.      Over 70 min  minutes of total time spent on the encounter, which includes face to face time and non-face to face time preparing to see the patient (eg, review of tests), Obtaining and/or reviewing separately obtained history, Documenting clinical information in the electronic or other health record, Independently interpreting results (not separately reported) and communicating results to the patient/family/caregiver, or Care coordination (not separately reported).      Gen: no distress  EYES: conjunctiva clear, non-icteric, PERRL  ENT: nose clear, nasal mucosa normal, oropharynx clear and moist, teeth good  NECK:supple, thyroid non-palpable  RESP: effort is good, lungs clear  CV: heart RRR w/o murmur, gallops or rubs; no carotid bruits, no edema  GI: abdomen soft, non-distended, non-tender, no hepatosplenomegaly  MS: gait normal, no clubbing or cyanosis of the digits  SKIN: no rashes, warm to touch    Marcos was seen today for annual exam.    Diagnoses and all orders for this visit:    Hyperlipidemia, unspecified hyperlipidemia type excellent control    Routine medical exam, up-to-date on PSA screening and we will get colonoscopy set up    Screening for prostate cancer    Screening for colorectal cancer  -     Ambulatory referral/consult to Endo Procedure ; Future    History of colonic polyps  -     Ambulatory referral/consult to Endo Procedure ; Future    Benign prostatic hyperplasia,  unspecified whether lower urinary tract symptoms present, chronic and stable    Hyperglycemia, normal A1c    History of diverticulitis    Cardiac pacemaker, chronic and stable    2:1 Second degree AV block    CHB (complete heart block)

## 2022-12-27 ENCOUNTER — CLINICAL SUPPORT (OUTPATIENT)
Dept: CARDIOLOGY | Facility: HOSPITAL | Age: 67
End: 2022-12-27
Payer: MEDICARE

## 2022-12-27 DIAGNOSIS — I44.2 ATRIOVENTRICULAR BLOCK, COMPLETE: ICD-10-CM

## 2022-12-27 DIAGNOSIS — Z95.0 PRESENCE OF CARDIAC PACEMAKER: ICD-10-CM

## 2022-12-27 PROCEDURE — 93296 REM INTERROG EVL PM/IDS: CPT | Performed by: INTERNAL MEDICINE

## 2022-12-27 PROCEDURE — 93294 CARDIAC DEVICE CHECK - REMOTE: ICD-10-PCS | Mod: ,,, | Performed by: INTERNAL MEDICINE

## 2022-12-27 PROCEDURE — 93294 REM INTERROG EVL PM/LDLS PM: CPT | Mod: ,,, | Performed by: INTERNAL MEDICINE

## 2023-01-31 ENCOUNTER — CLINICAL SUPPORT (OUTPATIENT)
Dept: ENDOSCOPY | Facility: HOSPITAL | Age: 68
End: 2023-01-31
Attending: INTERNAL MEDICINE
Payer: MEDICARE

## 2023-01-31 VITALS — HEIGHT: 74 IN | WEIGHT: 170 LBS | BODY MASS INDEX: 21.82 KG/M2

## 2023-01-31 DIAGNOSIS — Z86.010 HISTORY OF COLONIC POLYPS: ICD-10-CM

## 2023-01-31 DIAGNOSIS — Z12.12 SCREENING FOR COLORECTAL CANCER: ICD-10-CM

## 2023-01-31 DIAGNOSIS — Z12.11 SCREENING FOR COLORECTAL CANCER: ICD-10-CM

## 2023-01-31 NOTE — PLAN OF CARE
Colonoscopy due 12/2023. New REFENDO placed and deferred for 6 months. Explained to patient that he will be able to schedule another PAT appointment in 6 months. Patient verbalized understanding.

## 2023-03-05 ENCOUNTER — OFFICE VISIT (OUTPATIENT)
Dept: URGENT CARE | Facility: CLINIC | Age: 68
End: 2023-03-05
Payer: MEDICARE

## 2023-03-05 VITALS
RESPIRATION RATE: 18 BRPM | DIASTOLIC BLOOD PRESSURE: 83 MMHG | TEMPERATURE: 97 F | HEIGHT: 74 IN | WEIGHT: 170 LBS | SYSTOLIC BLOOD PRESSURE: 167 MMHG | OXYGEN SATURATION: 98 % | HEART RATE: 69 BPM | BODY MASS INDEX: 21.82 KG/M2

## 2023-03-05 DIAGNOSIS — J02.9 SORE THROAT: ICD-10-CM

## 2023-03-05 DIAGNOSIS — J01.91 ACUTE RECURRENT SINUSITIS, UNSPECIFIED LOCATION: Primary | ICD-10-CM

## 2023-03-05 LAB
CTP QC/QA: YES
MOLECULAR STREP A: NEGATIVE

## 2023-03-05 PROCEDURE — 99203 OFFICE O/P NEW LOW 30 MIN: CPT | Mod: S$GLB,,,

## 2023-03-05 PROCEDURE — 99203 PR OFFICE/OUTPT VISIT, NEW, LEVL III, 30-44 MIN: ICD-10-PCS | Mod: S$GLB,,,

## 2023-03-05 PROCEDURE — 87651 STREP A DNA AMP PROBE: CPT | Mod: QW,S$GLB,,

## 2023-03-05 PROCEDURE — 87651 POCT STREP A MOLECULAR: ICD-10-PCS | Mod: QW,S$GLB,,

## 2023-03-05 RX ORDER — AMOXICILLIN AND CLAVULANATE POTASSIUM 875; 125 MG/1; MG/1
1 TABLET, FILM COATED ORAL 2 TIMES DAILY
Qty: 14 TABLET | Refills: 0 | Status: SHIPPED | OUTPATIENT
Start: 2023-03-05 | End: 2023-03-12

## 2023-03-05 NOTE — PROGRESS NOTES
"Subjective:       Patient ID: Marcos Elaine is a 67 y.o. male.    Vitals:  height is 6' 2" (1.88 m) and weight is 77.1 kg (170 lb). His tympanic temperature is 97.2 °F (36.2 °C). His blood pressure is 167/83 (abnormal) and his pulse is 69. His respiration is 18 and oxygen saturation is 98%.     Chief Complaint: Sore Throat (My throat has been bothering me for about 3 weeks - Entered by patient)    67-year-old male patient complain of sore throat, productive cough, runny nose, sinus congestion for the past 3 weeks.  Patient denies any shortness of breath, chest pain, GI symptoms, or any other associated symptoms.  Patient reports he is taken ibuprofen and salt water gargles daily for his symptoms.     Sore Throat   This is a new problem. The current episode started 1 to 4 weeks ago. The problem has been unchanged. There has been no fever. The pain is at a severity of 5/10. The pain is mild. Pertinent negatives include no abdominal pain, ear discharge, ear pain or neck pain. He has tried NSAIDs for the symptoms.     Constitution: Negative for activity change, appetite change, chills and sweating.   HENT:  Positive for sore throat. Negative for ear pain, ear discharge and foreign body in ear.    Neck: Negative for neck pain, neck stiffness and painful lymph nodes.   Cardiovascular:  Negative for chest trauma, chest pain and leg swelling.   Eyes:  Negative for eye trauma and foreign body in eye.   Respiratory:  Negative for sleep apnea and chest tightness.    Gastrointestinal:  Negative for abdominal trauma and abdominal pain.   Endocrine: hair loss and cold intolerance.   Genitourinary:  Negative for dysuria, frequency, urgency, urine decreased and flank pain.   Musculoskeletal:  Negative for pain and trauma.   Skin:  Negative for color change, pale and rash.   Allergic/Immunologic: Negative for environmental allergies, seasonal allergies, food allergies and eczema.   Neurological:  Negative for dizziness, " HISTORY OF PRESENT ILLNESS:   Shirin is a 27 year old       who presents today for postpartum examination. She delivered a baby boy by vaginal delivery on 6/10/22.  She reports no issues.  Postpartum bleeding stopped a couple weeks after delivery.  Has not had a period. She has no residual pain.  She is breast feeding her baby and reports no breast concerns.  Mood has been good, and she reports no signs or symptoms of postpartum depression, she is coping well.  She has not resumed sexual activity, and plans to use tubal for contraception.  Kegel exercises were discussed and recommended.      I have reviewed the patient's medications and allergies, past medical, surgical, social and family history, updating these as appropriate.  See Histories section of the electronic medical record for a display of this information.    EXAM:  Visit Vitals  /60   Ht 5' 8\" (1.727 m)   Wt 108 kg (238 lb 1.6 oz)   LMP 09/10/2021 (Exact Date)   Breastfeeding Yes   BMI 36.20 kg/m²     General: Well-developed and well-nourished, appropriately groomed.  HEENT: Normocephalic. Nonicteric sclerae. Pink and moist mucous membranes. Normal dentition..  Heart: Regular rate and rhythm.  Lungs: Clear to auscultation bilaterally.  Breasts: Exam deferred.  Abdomen: Soft, nontender, nondistended. No hepatosplenomegaly or other organomegaly noted. Uterus involuted.  Incision: well healed  Extremities: No edema or pain to palpation.  Skin: No obvious lesions or rashes.  Psychiatric: Alert and oriented to person, place, and time. Appropriate affect and mood.  Pelvic: deferred    Recent PHQ 2/9 Score    PHQ 2:  Date Adult PHQ 2 Score Adult PHQ 2 Interpretation   2022 2 No further screening needed       PHQ 9:  Date Adult PHQ 9 Score Adult PHQ 9 Interpretation   2022 4 Minimal Depression       ASSESSMENT & PLAN:  27 year old       female for postpartum exam following vaginal delivery.  The patient has done well in the postpartum  disorientation and altered mental status.   Hematologic/Lymphatic: Negative for swollen lymph nodes and easy bruising/bleeding. Does not bruise/bleed easily.   Psychiatric/Behavioral:  Negative for altered mental status, disorientation and confusion.      Objective:      Physical Exam   Constitutional: He is oriented to person, place, and time. He appears well-developed. He is cooperative.  Non-toxic appearance. He does not appear ill. No distress.   HENT:   Head: Normocephalic and atraumatic.   Ears:   Right Ear: Hearing, tympanic membrane, external ear and ear canal normal.   Left Ear: Hearing, tympanic membrane, external ear and ear canal normal.   Nose: Rhinorrhea present. No mucosal edema or nasal deformity. No epistaxis. Right sinus exhibits no maxillary sinus tenderness and no frontal sinus tenderness. Left sinus exhibits no maxillary sinus tenderness and no frontal sinus tenderness.   Mouth/Throat: Uvula is midline and mucous membranes are normal. No trismus in the jaw. Normal dentition. No uvula swelling. Posterior oropharyngeal erythema present. No oropharyngeal exudate or posterior oropharyngeal edema.   Eyes: Conjunctivae and lids are normal. No scleral icterus.   Neck: Trachea normal and phonation normal. Neck supple. No edema present. No erythema present. No neck rigidity present.   Cardiovascular: Normal rate, regular rhythm, normal heart sounds and normal pulses.   Pulmonary/Chest: Effort normal and breath sounds normal. No respiratory distress. He has no decreased breath sounds. He has no rhonchi.   Abdominal: Normal appearance.   Musculoskeletal: Normal range of motion.         General: No deformity. Normal range of motion.   Neurological: He is alert and oriented to person, place, and time. He exhibits normal muscle tone. Coordination normal.   Skin: Skin is warm, dry, intact, not diaphoretic and not pale.   Psychiatric: His speech is normal and behavior is normal. Judgment and thought content  period. She will be using tubal for contraception. She will continue to follow up on an as-needed basis.           normal.   Nursing note and vitals reviewed.      Assessment:       1. Acute recurrent sinusitis, unspecified location    2. Sore throat          Plan:       Results for orders placed or performed in visit on 03/05/23   POCT Strep A, Molecular   Result Value Ref Range    Molecular Strep A, POC Negative Negative     Acceptable Yes       Patient Instructions   Take Augmentin antibiotic twice daily for 7 days.  Magic mouthwash swish and spit as needed for sore throat.  Take over-the-counter Mucinex DM for cough, Flonase as needed for nasal congestion, and an antihistamine such as Claritin, Zyrtec, or Allegra daily for your sinus.     What care is needed at home?   Call your regular doctor to let them know you were in the ED. Make a follow-up appointment if you were told to.  Try to thin the mucus.  Drink lots of liquids to stay hydrated.  Use a cool mist humidifier to avoid dry air.  Use saline nose drops or a saline nose rinse to relieve stuffiness.  Wash your hands often. This will help keep others healthy.  Do not smoke or be in smoke-filled places. Avoid things that may cause breathing problems like fumes, pollution, dust, and other common allergens and irritants.  You may want to take medicine like ibuprofen, naproxen, or acetaminophen to help with pain.  When do I need to get emergency help?   Return to the ED if:   You have a stiff neck, especially if you also have fever, chills, vomiting, or severe headache  You have trouble thinking clearly.  You have trouble seeing or have double vision.  You have swelling or redness or pain around one or both eyes.  You have a fever of 102°F (38.9°C) or higher, or have shaking chills or sweats.  - Rest.    - Drink plenty of fluids.    - Acetaminophen (tylenol) or Ibuprofen (advil,motrin) as directed as needed for fever/pain. Avoid tylenol if you have a history of liver disease. Do not take ibuprofen if you have a history of GI bleeding, kidney disease, or if  you take blood thinners.     - Follow up with your PCP or specialty clinic as directed in the next 1-2 weeks if not improved or as needed.  You can call [phone] to schedule an appointment with the appropriate provider.    - Go to the ER or seek medical attention immediately if you develop new or worsening symptoms.     - You must understand that you have received an Urgent Care treatment only and that you may be released before all of your medical problems are known or treated.   - You, the patient, will arrange for follow up care as instructed.   - If your condition worsens or fails to improve we recommend that you receive another evaluation at the ER immediately or contact your PCP to discuss your concerns or return here.      Acute recurrent sinusitis, unspecified location  -     amoxicillin-clavulanate 875-125mg (AUGMENTIN) 875-125 mg per tablet; Take 1 tablet by mouth 2 (two) times daily. for 7 days  Dispense: 14 tablet; Refill: 0    Sore throat  -     POCT Strep A, Molecular  -     diphenhydrAMINE-aluminum-magnesium hydroxide-simethicone-LIDOcaine HCl 2%; Swish and spit 15 mLs every 4 (four) hours as needed (Sore throat). 220ml total  Dispense: 220 each; Refill: 0

## 2023-03-27 ENCOUNTER — CLINICAL SUPPORT (OUTPATIENT)
Dept: CARDIOLOGY | Facility: HOSPITAL | Age: 68
End: 2023-03-27
Payer: MEDICARE

## 2023-03-27 DIAGNOSIS — Z95.0 PRESENCE OF CARDIAC PACEMAKER: ICD-10-CM

## 2023-03-27 DIAGNOSIS — I44.2 ATRIOVENTRICULAR BLOCK, COMPLETE: ICD-10-CM

## 2023-03-27 PROCEDURE — 93296 REM INTERROG EVL PM/IDS: CPT | Performed by: INTERNAL MEDICINE

## 2023-04-03 DIAGNOSIS — I44.2 CHB (COMPLETE HEART BLOCK): Primary | ICD-10-CM

## 2023-04-14 ENCOUNTER — PES CALL (OUTPATIENT)
Dept: ADMINISTRATIVE | Facility: CLINIC | Age: 68
End: 2023-04-14
Payer: COMMERCIAL

## 2023-05-09 ENCOUNTER — CLINICAL SUPPORT (OUTPATIENT)
Dept: ENDOSCOPY | Facility: HOSPITAL | Age: 68
End: 2023-05-09
Attending: INTERNAL MEDICINE
Payer: MEDICARE

## 2023-05-09 DIAGNOSIS — Z12.11 SCREENING FOR COLORECTAL CANCER: ICD-10-CM

## 2023-05-09 DIAGNOSIS — Z12.12 SCREENING FOR COLORECTAL CANCER: ICD-10-CM

## 2023-05-09 DIAGNOSIS — Z86.010 HISTORY OF COLONIC POLYPS: ICD-10-CM

## 2023-05-09 NOTE — PROGRESS NOTES
Mr. Elanie is a patient of Dr. Hanson and was last seen in clinic 5/10/2022.      Subjective:   Patient ID:  Marcos Elaine is a 67 y.o. male who presents for follow up of CRT-P  .     HPI:    Mr. Elaine is a 67 y.o. male with CHD, CRT-P here for annual follow up.     Background:    67 yoM CHB s/p DC PM here for follow up after upgrade to CRT-P 10/14/19. He had 2:1 AVB s/p DC PM 3/6/06325. He progressed from intermittent RV pacing to 100% RV pacing over the past 2 years. He had change in EF from 55% to 45% with development of HF symptoms. PET stress was negative for ischemia. He went for CRT-P upgrade 10/14/19.     1/21/2020: Since upgrade he has had improvement in HF symptoms. Normal CRT-P parameters today. No sustained arrhythmias.     2/21: Stable symptoms. Normal CRT-P function    5/10/2022: Normal CRT-P function with no underlying. No sustained arrhythmias.   66 yoM CHB s/p CRT-P here for routine follow up. Normal CRT-P function with no sustained arrhythmias. I discussed routine device follow up including quarterly to bi-annual device checks for device function as well as yearly follow up in the EP clinic. The patient  was advised to call with any concerns regarding their device. Device clinic follow up as scheduled. RTC 1y    Update (05/18/2023):    10/11/2022: ALERT via [facility] for LV lead impedance > 3000 Ohms  Device check: LV lead impedances >3000 Ohms in any configuration using proximal electrode  Tested LV lead in multiple configurations, opted to use D1-M2: impedance 860 Ohms, capture threshold 1.75V @ 0.40ms.  EKG in this configuration shows QRSd of 164ms  Reprogramming at this visit: Changed LV configuration to D1-M2, adjusted LV Cap Pacing Margin to +0.5V, narrowed impedance monitoring to 1500 Ohms    Today he says he is feeling well with no cardiac complaints. No PAUL, CP, palps, LH, syncope reported.    Device Interrogation (5/18/2023) reveals an intrinsic SR with CHB with stable lead and device  "function. No arrhythmias or treated episodes were noted.  He paces 17% in the RA and 100% in the BiV. Estimated battery longevity 4.9 years.     I have personally reviewed the patient's EKG today, which shows APBP at 60bpm. HI interval is 166. QRS is 160. QT is 490.    Relevant Cardiac Test Results:    2D Echo (2/19/2021):  The left ventricle is normal in size with concentric remodeling and normal systolic function. The estimated ejection fraction is 58%.  Normal left ventricular diastolic function.  Normal right ventricular size with normal right ventricular systolic function.  Intermediate central venous pressure (8 mmHg).  The estimated PA systolic pressure is 30 mmHg.    Current Outpatient Medications   Medication Sig    CALCIUM POLYCARBOPHIL (KONSYL FIBER ORAL) Take by mouth.    glucosamine-chondroitin 500-400 mg tablet Take 1 tablet by mouth 3 (three) times daily.    multivitamin capsule Take 1 capsule by mouth once daily.    simvastatin (ZOCOR) 40 MG tablet Take 1 tablet by mouth once daily     No current facility-administered medications for this visit.     Facility-Administered Medications Ordered in Other Visits   Medication    0.9%  NaCl infusion    0.9%  NaCl infusion    mupirocin 2 % ointment       Review of Systems   Constitutional: Negative for malaise/fatigue.   Cardiovascular:  Negative for chest pain, dyspnea on exertion, irregular heartbeat, leg swelling and palpitations.   Respiratory:  Negative for shortness of breath.    Hematologic/Lymphatic: Negative for bleeding problem.   Skin:  Negative for rash.   Musculoskeletal:  Negative for myalgias.   Gastrointestinal:  Negative for hematemesis, hematochezia and nausea.   Genitourinary:  Negative for hematuria.   Neurological:  Negative for light-headedness.   Psychiatric/Behavioral:  Negative for altered mental status.    Allergic/Immunologic: Negative for persistent infections.     Objective:          /82   Pulse 60   Ht 6' 2" (1.88 m)   " Wt 72.6 kg (160 lb)   BMI 20.54 kg/m²     Physical Exam  Vitals and nursing note reviewed.   Constitutional:       Appearance: Normal appearance. He is well-developed.   HENT:      Head: Normocephalic.      Nose: Nose normal.   Eyes:      Pupils: Pupils are equal, round, and reactive to light.   Cardiovascular:      Rate and Rhythm: Normal rate and regular rhythm.   Pulmonary:      Effort: No respiratory distress.      Breath sounds: Normal breath sounds.   Chest:      Comments: Device to LUCW. Incision and pocket in good repair.    Musculoskeletal:         General: Normal range of motion.   Skin:     General: Skin is warm and dry.      Findings: No erythema.   Neurological:      Mental Status: He is alert and oriented to person, place, and time.   Psychiatric:         Speech: Speech normal.         Behavior: Behavior normal.         Lab Results   Component Value Date     12/12/2022    K 4.3 12/12/2022    MG 2.5 03/05/2013    BUN 13 12/12/2022    CREATININE 0.8 12/12/2022    ALT 21 12/12/2022    AST 21 12/12/2022    HGB 14.7 12/12/2022    HCT 45.4 12/12/2022    TSH 0.921 12/12/2022    LDLCALC 79.8 12/12/2022             Assessment:     1. Presence of cardiac resynchronization therapy pacemaker (CRT-P)    2. CHB (complete heart block)    3. History of cardiomyopathy - recovered after CRT       Plan:     In summary, Mr. Elaine is a 67 y.o. male with CHD, CRT-P here for annual follow up.   Mr. Elaine is doing well from a device perspective with stable lead and device function. LV lead config changed in October 2022 due to increased impedence. Parameters now remain stable. No arrhythmia noted. 100% biventricular pacing. No CHF symptoms. Echo 2/2021 showed recovery of LV function. He is feeling well.    Continue current medication regimen and device settings.   Follow up in device clinic as scheduled.   Follow up in EP clinic in 1 year, sooner as needed.     *A copy of this note has been sent to   Val[facility]*    Follow up in about 1 year (around 5/18/2024).    ------------------------------------------------------------------    SERENA Carbajal, NP-C  Cardiac Electrophysiology

## 2023-05-16 ENCOUNTER — OFFICE VISIT (OUTPATIENT)
Dept: FAMILY MEDICINE | Facility: CLINIC | Age: 68
End: 2023-05-16
Payer: MEDICARE

## 2023-05-16 VITALS
TEMPERATURE: 98 F | OXYGEN SATURATION: 96 % | SYSTOLIC BLOOD PRESSURE: 124 MMHG | DIASTOLIC BLOOD PRESSURE: 68 MMHG | WEIGHT: 160.38 LBS | BODY MASS INDEX: 20.58 KG/M2 | HEART RATE: 78 BPM | HEIGHT: 74 IN

## 2023-05-16 DIAGNOSIS — E78.5 HYPERLIPIDEMIA, UNSPECIFIED HYPERLIPIDEMIA TYPE: ICD-10-CM

## 2023-05-16 DIAGNOSIS — I44.2 CHB (COMPLETE HEART BLOCK): ICD-10-CM

## 2023-05-16 DIAGNOSIS — Z95.0 PRESENCE OF CARDIAC RESYNCHRONIZATION THERAPY PACEMAKER (CRT-P): ICD-10-CM

## 2023-05-16 DIAGNOSIS — Z87.19 HISTORY OF DIVERTICULITIS: ICD-10-CM

## 2023-05-16 DIAGNOSIS — Z00.00 ENCOUNTER FOR PREVENTIVE HEALTH EXAMINATION: Primary | ICD-10-CM

## 2023-05-16 DIAGNOSIS — K21.9 GASTROESOPHAGEAL REFLUX DISEASE, UNSPECIFIED WHETHER ESOPHAGITIS PRESENT: ICD-10-CM

## 2023-05-16 DIAGNOSIS — Z86.010 PERSONAL HISTORY OF COLONIC POLYPS: ICD-10-CM

## 2023-05-16 PROCEDURE — 99999 PR PBB SHADOW E&M-EST. PATIENT-LVL IV: CPT | Mod: PBBFAC,,, | Performed by: NURSE PRACTITIONER

## 2023-05-16 PROCEDURE — G0439 PPPS, SUBSEQ VISIT: HCPCS | Mod: ,,, | Performed by: NURSE PRACTITIONER

## 2023-05-16 PROCEDURE — 99999 PR PBB SHADOW E&M-EST. PATIENT-LVL IV: ICD-10-PCS | Mod: PBBFAC,,, | Performed by: NURSE PRACTITIONER

## 2023-05-16 PROCEDURE — G0439 PR MEDICARE ANNUAL WELLNESS SUBSEQUENT VISIT: ICD-10-PCS | Mod: ,,, | Performed by: NURSE PRACTITIONER

## 2023-05-16 PROCEDURE — 99214 OFFICE O/P EST MOD 30 MIN: CPT | Mod: PBBFAC,PO | Performed by: NURSE PRACTITIONER

## 2023-05-16 NOTE — PATIENT INSTRUCTIONS
Counseling and Referral of Other Preventative  (Italic type indicates deductible and co-insurance are waived)    Patient Name: Marcos Elaine  Today's Date: 5/16/2023    Health Maintenance       Date Due Completion Date    TETANUS VACCINE Never done ---    Abdominal Aortic Aneurysm Screening Never done ---    COVID-19 Vaccine (5 - Booster for Moderna series) 07/05/2022 5/10/2022    Colorectal Cancer Screening 12/30/2023 12/30/2013        No orders of the defined types were placed in this encounter.      The following information is provided to all patients.  This information is to help you find resources for any of the problems found today that may be affecting your health:                Living healthy guide: www.[facility].[place].gov      Understanding Diabetes: www.diabetes.org      Eating healthy: www.cdc.gov/healthyweight      CDC home safety checklist: www.cdc.gov/steadi/patient.html      Agency on Aging: www.goea.[place].[facility]      Alcoholics anonymous (AA): www.aa.org      Physical Activity: www.nola.nih.gov/ef6cdzb      Tobacco use: www.quitwithusla.org

## 2023-05-16 NOTE — PROGRESS NOTES
"  Marcos Elaine presented for a  Medicare AWV and comprehensive Health Risk Assessment today. The following components were reviewed and updated:    Medical history  Family History  Social history  Allergies and Current Medications  Health Risk Assessment  Health Maintenance  Care Team       ** See Completed Assessments for Annual Wellness Visit within the encounter summary.**       The following assessments were completed:  Living Situation  CAGE  Depression Screening  Timed Get Up and Go  Whisper Test  Cognitive Function Screening  Nutrition Screening  ADL Screening  PAQ Screening          Vitals:    05/16/23 1440   BP: 124/68   BP Location: Right arm   Patient Position: Sitting   BP Method: Large (Manual)   Pulse: 78   Temp: 98.3 °F (36.8 °C)   TempSrc: Oral   SpO2: 96%   Weight: 72.7 kg (160 lb 6.2 oz)   Height: 6' 2" (1.88 m)     Body mass index is 20.59 kg/m².  Physical Exam  Vitals and nursing note reviewed.   Constitutional:       Appearance: Normal appearance.   Cardiovascular:      Rate and Rhythm: Normal rate.      Pulses: Normal pulses.      Heart sounds: Normal heart sounds.   Pulmonary:      Effort: Pulmonary effort is normal.      Breath sounds: Normal breath sounds.   Musculoskeletal:         General: Normal range of motion.   Neurological:      Mental Status: He is alert and oriented to person, place, and time.   Psychiatric:         Mood and Affect: Mood normal.         Behavior: Behavior normal.           Diagnoses and health risks identified today and associated recommendations/orders:    1. Encounter for preventive health examination  Pt was seen today for an Annual Wellness visit. Healthcare maintenance and screening recommendations were discussed and updated as indicated. Return in one year for AWV.    Review current opioid prescriptions:n/a  Screen for potential Substance Use Disorders:n/a    2. CHB (complete heart block)  The current medical regimen is effective;  continue present plan and " medications.    3. Presence of cardiac resynchronization therapy pacemaker (CRT-P)  The current medical regimen is effective;  continue present plan and medications.    4. Hyperlipidemia, unspecified hyperlipidemia type  The current medical regimen is effective;  continue present plan and medications.    5. Personal history of colonic polyps  The current medical regimen is effective;  continue present plan and medications.    6. Gastroesophageal reflux disease, unspecified whether esophagitis present  The current medical regimen is effective;  continue present plan and medications.    7. History of diverticulitis  The current medical regimen is effective;  continue present plan and medications.        Provided Marcos with a 5-10 year written screening schedule and personal prevention plan. Recommendations were developed using the USPSTF age appropriate recommendations. Education, counseling, and referrals were provided as needed. After Visit Summary printed and given to patient which includes a list of additional screenings\tests needed.    Follow up in about 1 year (around 5/16/2024).    CHRIS Dial  I offered to discuss advanced care planning, including how to pick a person who would make decisions for you if you were unable to make them for yourself, called a health care power of , and what kind of decisions you might make such as use of life sustaining treatments such as ventilators and tube feeding when faced with a life limiting illness recorded on a living will that they will need to know. (How you want to be cared for as you near the end of your natural life)     X Patient is interested in learning more about how to make advanced directives.  I provided them paperwork and offered to discuss this with them.

## 2023-05-18 ENCOUNTER — CLINICAL SUPPORT (OUTPATIENT)
Dept: CARDIOLOGY | Facility: HOSPITAL | Age: 68
End: 2023-05-18
Attending: INTERNAL MEDICINE
Payer: MEDICARE

## 2023-05-18 ENCOUNTER — OFFICE VISIT (OUTPATIENT)
Dept: ELECTROPHYSIOLOGY | Facility: CLINIC | Age: 68
End: 2023-05-18
Payer: MEDICARE

## 2023-05-18 ENCOUNTER — HOSPITAL ENCOUNTER (OUTPATIENT)
Dept: CARDIOLOGY | Facility: CLINIC | Age: 68
Discharge: HOME | End: 2023-05-18
Payer: MEDICARE

## 2023-05-18 VITALS
WEIGHT: 160 LBS | HEART RATE: 60 BPM | SYSTOLIC BLOOD PRESSURE: 130 MMHG | HEIGHT: 74 IN | BODY MASS INDEX: 20.53 KG/M2 | DIASTOLIC BLOOD PRESSURE: 82 MMHG

## 2023-05-18 DIAGNOSIS — I44.2 CHB (COMPLETE HEART BLOCK): ICD-10-CM

## 2023-05-18 DIAGNOSIS — Z95.0 PRESENCE OF CARDIAC RESYNCHRONIZATION THERAPY PACEMAKER (CRT-P): Primary | ICD-10-CM

## 2023-05-18 DIAGNOSIS — Z86.79 HISTORY OF CARDIOMYOPATHY: ICD-10-CM

## 2023-05-18 PROCEDURE — 93010 RHYTHM STRIP: ICD-10-PCS | Mod: S$PBB,,, | Performed by: INTERNAL MEDICINE

## 2023-05-18 PROCEDURE — 99213 OFFICE O/P EST LOW 20 MIN: CPT | Mod: PBBFAC | Performed by: NURSE PRACTITIONER

## 2023-05-18 PROCEDURE — 93281 CARDIAC DEVICE CHECK - IN CLINIC & HOSPITAL: ICD-10-PCS | Mod: 26,,, | Performed by: INTERNAL MEDICINE

## 2023-05-18 PROCEDURE — 93281 PM DEVICE PROGR EVAL MULTI: CPT

## 2023-05-18 PROCEDURE — 99999 PR PBB SHADOW E&M-EST. PATIENT-LVL III: ICD-10-PCS | Mod: PBBFAC,,, | Performed by: NURSE PRACTITIONER

## 2023-05-18 PROCEDURE — 99214 OFFICE O/P EST MOD 30 MIN: CPT | Mod: S$PBB,,, | Performed by: NURSE PRACTITIONER

## 2023-05-18 PROCEDURE — 93010 ELECTROCARDIOGRAM REPORT: CPT | Mod: S$PBB,,, | Performed by: INTERNAL MEDICINE

## 2023-05-18 PROCEDURE — 99214 PR OFFICE/OUTPT VISIT, EST, LEVL IV, 30-39 MIN: ICD-10-PCS | Mod: S$PBB,,, | Performed by: NURSE PRACTITIONER

## 2023-05-18 PROCEDURE — 99999 PR PBB SHADOW E&M-EST. PATIENT-LVL III: CPT | Mod: PBBFAC,,, | Performed by: NURSE PRACTITIONER

## 2023-05-18 PROCEDURE — 93005 ELECTROCARDIOGRAM TRACING: CPT | Mod: PBBFAC | Performed by: INTERNAL MEDICINE

## 2023-05-18 PROCEDURE — 93281 PM DEVICE PROGR EVAL MULTI: CPT | Mod: 26,,, | Performed by: INTERNAL MEDICINE

## 2023-06-25 ENCOUNTER — CLINICAL SUPPORT (OUTPATIENT)
Dept: CARDIOLOGY | Facility: HOSPITAL | Age: 68
End: 2023-06-25
Payer: MEDICARE

## 2023-06-25 DIAGNOSIS — Z95.0 PRESENCE OF CARDIAC PACEMAKER: ICD-10-CM

## 2023-06-25 PROCEDURE — 93294 REM INTERROG EVL PM/LDLS PM: CPT | Mod: ,,, | Performed by: INTERNAL MEDICINE

## 2023-06-25 PROCEDURE — 93296 REM INTERROG EVL PM/IDS: CPT | Performed by: INTERNAL MEDICINE

## 2023-06-25 PROCEDURE — 93294 CARDIAC DEVICE CHECK - REMOTE: ICD-10-PCS | Mod: ,,, | Performed by: INTERNAL MEDICINE

## 2023-09-23 ENCOUNTER — CLINICAL SUPPORT (OUTPATIENT)
Dept: CARDIOLOGY | Facility: HOSPITAL | Age: 68
End: 2023-09-23
Payer: MEDICARE

## 2023-09-23 DIAGNOSIS — Z95.0 PRESENCE OF CARDIAC PACEMAKER: ICD-10-CM

## 2023-09-23 DIAGNOSIS — I44.2 ATRIOVENTRICULAR BLOCK, COMPLETE: ICD-10-CM

## 2023-09-23 PROCEDURE — 93296 REM INTERROG EVL PM/IDS: CPT | Performed by: INTERNAL MEDICINE

## 2023-09-29 ENCOUNTER — CLINICAL SUPPORT (OUTPATIENT)
Dept: ENDOSCOPY | Facility: HOSPITAL | Age: 68
End: 2023-09-29
Attending: INTERNAL MEDICINE
Payer: MEDICARE

## 2023-09-29 VITALS — BODY MASS INDEX: 20.53 KG/M2 | WEIGHT: 160 LBS | HEIGHT: 74 IN

## 2023-09-29 DIAGNOSIS — Z12.11 SCREENING FOR COLORECTAL CANCER: ICD-10-CM

## 2023-09-29 DIAGNOSIS — Z12.12 SCREENING FOR COLORECTAL CANCER: ICD-10-CM

## 2023-09-29 DIAGNOSIS — Z86.010 HISTORY OF COLONIC POLYPS: ICD-10-CM

## 2023-10-04 ENCOUNTER — TELEPHONE (OUTPATIENT)
Dept: ENDOSCOPY | Facility: HOSPITAL | Age: 68
End: 2023-10-04
Payer: MEDICARE

## 2023-10-04 VITALS — BODY MASS INDEX: 20.53 KG/M2 | WEIGHT: 160 LBS | HEIGHT: 74 IN

## 2023-10-04 DIAGNOSIS — Z12.11 SCREENING FOR COLORECTAL CANCER: Primary | ICD-10-CM

## 2023-10-04 DIAGNOSIS — Z86.010 HISTORY OF COLONIC POLYPS: ICD-10-CM

## 2023-10-04 DIAGNOSIS — Z12.12 SCREENING FOR COLORECTAL CANCER: Primary | ICD-10-CM

## 2023-10-04 RX ORDER — POLYETHYLENE GLYCOL 3350, SODIUM SULFATE ANHYDROUS, SODIUM BICARBONATE, SODIUM CHLORIDE, POTASSIUM CHLORIDE 236; 22.74; 6.74; 5.86; 2.97 G/4L; G/4L; G/4L; G/4L; G/4L
4 POWDER, FOR SOLUTION ORAL ONCE
Qty: 4000 ML | Refills: 0 | Status: SHIPPED | OUTPATIENT
Start: 2023-10-04 | End: 2023-10-04

## 2023-10-04 NOTE — TELEPHONE ENCOUNTER
Spoke to patient to schedule procedure(s) Colonoscopy       Physician to perform procedure(s) Dr. DARWIN Hartley  Date of Procedure (s) 1/22/24  Arrival Time 8:20 AM  Time of Procedure(s) 9:20 AM   Location of Procedure(s) [facility]  Type of Rx Prep sent to patient: PEG  Instructions provided to patient via [facility]    Patient was informed on the following information and verbalized understanding. Screening questionnaire reviewed with patient and complete. If procedure requires anesthesia, a responsible adult needs to be present to accompany the patient home, patient cannot drive after receiving anesthesia. Appointment details are tentative, especially check-in time. Patient will receive a prep-op call 4 days prior to confirm check-in time for procedure. If applicable the patient should contact their pharmacy to verify Rx for procedure prep is ready for pick-up. Patient was advised to call the scheduling department at [phone] if pharmacy states no Rx is available. Patient was advised to call the endoscopy scheduling department if any questions or concerns arise.      [facility] Scheduling Department

## 2023-10-04 NOTE — TELEPHONE ENCOUNTER
Received message from MERRILL Foreman         MRN:    [id number] is calling to schedule procedure for Scooby

## 2023-10-05 ENCOUNTER — TELEPHONE (OUTPATIENT)
Dept: ENDOSCOPY | Facility: HOSPITAL | Age: 68
End: 2023-10-05
Payer: MEDICARE

## 2023-12-07 ENCOUNTER — TELEPHONE (OUTPATIENT)
Dept: FAMILY MEDICINE | Facility: CLINIC | Age: 68
End: 2023-12-07
Payer: MEDICARE

## 2023-12-07 DIAGNOSIS — N40.0 BENIGN PROSTATIC HYPERPLASIA, UNSPECIFIED WHETHER LOWER URINARY TRACT SYMPTOMS PRESENT: ICD-10-CM

## 2023-12-07 DIAGNOSIS — E78.5 HYPERLIPIDEMIA, UNSPECIFIED HYPERLIPIDEMIA TYPE: Primary | ICD-10-CM

## 2023-12-07 NOTE — TELEPHONE ENCOUNTER
Patient would like to complete his lab appointment prior to his appointment on 12/14/2023. Please place order. I will contact the patient to schedule.

## 2023-12-07 NOTE — TELEPHONE ENCOUNTER
----- Message from Juany Floyd sent at 12/7/2023  9:47 AM CST -----  Regarding: Lab order request  .Type: Lab    Caller is requesting to schedule their Lab appointment prior to annual appointment.    Order is not listed in EPIC.  Please enter order and contact patient to schedule.    Name of Caller:elf     Preferred Date and Time of Labs:before appointment date     Date of EPP Appointment:12/14/2023    Where would they like the lab performed?[facility]     Would the patient rather a call back or a response via [facility]? Call   Best Call Back Number:.[phone]      Additional Information:

## 2023-12-07 NOTE — TELEPHONE ENCOUNTER
----- Message from Benson Elizabeth sent at 12/7/2023  9:44 AM CST -----   Name of Who is Calling:     What is the request in detail:  patient request call back in reference to annual lab orders  Please contact to further discuss and advise      Can the clinic reply by [facility]:     [facility]   [phone]

## 2023-12-08 ENCOUNTER — PATIENT MESSAGE (OUTPATIENT)
Dept: FAMILY MEDICINE | Facility: CLINIC | Age: 68
End: 2023-12-08
Payer: MEDICARE

## 2023-12-12 ENCOUNTER — LAB VISIT (OUTPATIENT)
Dept: LAB | Facility: HOSPITAL | Age: 68
End: 2023-12-12
Attending: INTERNAL MEDICINE
Payer: MEDICARE

## 2023-12-12 DIAGNOSIS — N40.0 BENIGN PROSTATIC HYPERPLASIA, UNSPECIFIED WHETHER LOWER URINARY TRACT SYMPTOMS PRESENT: ICD-10-CM

## 2023-12-12 DIAGNOSIS — E78.5 HYPERLIPIDEMIA, UNSPECIFIED HYPERLIPIDEMIA TYPE: ICD-10-CM

## 2023-12-12 LAB
ALBUMIN SERPL BCP-MCNC: 3.7 G/DL (ref 3.5–5.2)
ALP SERPL-CCNC: 83 U/L (ref 55–135)
ALT SERPL W/O P-5'-P-CCNC: 17 U/L (ref 10–44)
ANION GAP SERPL CALC-SCNC: 11 MMOL/L (ref 8–16)
AST SERPL-CCNC: 20 U/L (ref 10–40)
BASOPHILS # BLD AUTO: 0.04 K/UL (ref 0–0.2)
BASOPHILS NFR BLD: 0.8 % (ref 0–1.9)
BILIRUB SERPL-MCNC: 0.6 MG/DL (ref 0.1–1)
BUN SERPL-MCNC: 12 MG/DL (ref 8–23)
CALCIUM SERPL-MCNC: 9.3 MG/DL (ref 8.7–10.5)
CHLORIDE SERPL-SCNC: 104 MMOL/L (ref 95–110)
CHOLEST SERPL-MCNC: 263 MG/DL (ref 120–199)
CHOLEST/HDLC SERPL: 4.1 {RATIO} (ref 2–5)
CO2 SERPL-SCNC: 27 MMOL/L (ref 23–29)
COMPLEXED PSA SERPL-MCNC: 2.6 NG/ML (ref 0–4)
CREAT SERPL-MCNC: 0.9 MG/DL (ref 0.5–1.4)
DIFFERENTIAL METHOD: NORMAL
EOSINOPHIL # BLD AUTO: 0.3 K/UL (ref 0–0.5)
EOSINOPHIL NFR BLD: 6.5 % (ref 0–8)
ERYTHROCYTE [DISTWIDTH] IN BLOOD BY AUTOMATED COUNT: 13.3 % (ref 11.5–14.5)
EST. GFR  (NO RACE VARIABLE): >60 ML/MIN/1.73 M^2
GLUCOSE SERPL-MCNC: 98 MG/DL (ref 70–110)
HCT VFR BLD AUTO: 46.7 % (ref 40–54)
HDLC SERPL-MCNC: 64 MG/DL (ref 40–75)
HDLC SERPL: 24.3 % (ref 20–50)
HGB BLD-MCNC: 15.9 G/DL (ref 14–18)
IMM GRANULOCYTES # BLD AUTO: 0.01 K/UL (ref 0–0.04)
IMM GRANULOCYTES NFR BLD AUTO: 0.2 % (ref 0–0.5)
LDLC SERPL CALC-MCNC: 176 MG/DL (ref 63–159)
LYMPHOCYTES # BLD AUTO: 1.2 K/UL (ref 1–4.8)
LYMPHOCYTES NFR BLD: 24.4 % (ref 18–48)
MCH RBC QN AUTO: 30 PG (ref 27–31)
MCHC RBC AUTO-ENTMCNC: 34 G/DL (ref 32–36)
MCV RBC AUTO: 88 FL (ref 82–98)
MONOCYTES # BLD AUTO: 0.4 K/UL (ref 0.3–1)
MONOCYTES NFR BLD: 8.4 % (ref 4–15)
NEUTROPHILS # BLD AUTO: 3 K/UL (ref 1.8–7.7)
NEUTROPHILS NFR BLD: 59.7 % (ref 38–73)
NONHDLC SERPL-MCNC: 199 MG/DL
NRBC BLD-RTO: 0 /100 WBC
PLATELET # BLD AUTO: 251 K/UL (ref 150–450)
PMV BLD AUTO: 11.3 FL (ref 9.2–12.9)
POTASSIUM SERPL-SCNC: 4.5 MMOL/L (ref 3.5–5.1)
PROT SERPL-MCNC: 7.2 G/DL (ref 6–8.4)
RBC # BLD AUTO: 5.3 M/UL (ref 4.6–6.2)
SODIUM SERPL-SCNC: 142 MMOL/L (ref 136–145)
TRIGL SERPL-MCNC: 115 MG/DL (ref 30–150)
TSH SERPL DL<=0.005 MIU/L-ACNC: 1.77 UIU/ML (ref 0.4–4)
WBC # BLD AUTO: 5.09 K/UL (ref 3.9–12.7)

## 2023-12-12 PROCEDURE — 84153 ASSAY OF PSA TOTAL: CPT | Performed by: INTERNAL MEDICINE

## 2023-12-12 PROCEDURE — 85025 COMPLETE CBC W/AUTO DIFF WBC: CPT | Performed by: INTERNAL MEDICINE

## 2023-12-12 PROCEDURE — 36415 COLL VENOUS BLD VENIPUNCTURE: CPT | Mod: PO | Performed by: INTERNAL MEDICINE

## 2023-12-12 PROCEDURE — 84443 ASSAY THYROID STIM HORMONE: CPT | Performed by: INTERNAL MEDICINE

## 2023-12-12 PROCEDURE — 80053 COMPREHEN METABOLIC PANEL: CPT | Performed by: INTERNAL MEDICINE

## 2023-12-12 PROCEDURE — 80061 LIPID PANEL: CPT | Performed by: INTERNAL MEDICINE

## 2023-12-14 ENCOUNTER — HOSPITAL ENCOUNTER (OUTPATIENT)
Dept: RADIOLOGY | Facility: HOSPITAL | Age: 68
Discharge: HOME | End: 2023-12-14
Attending: INTERNAL MEDICINE
Payer: MEDICARE

## 2023-12-14 ENCOUNTER — OFFICE VISIT (OUTPATIENT)
Dept: FAMILY MEDICINE | Facility: CLINIC | Age: 68
End: 2023-12-14
Payer: MEDICARE

## 2023-12-14 VITALS
TEMPERATURE: 98 F | WEIGHT: 161.19 LBS | DIASTOLIC BLOOD PRESSURE: 80 MMHG | BODY MASS INDEX: 20.69 KG/M2 | OXYGEN SATURATION: 97 % | HEART RATE: 71 BPM | HEIGHT: 74 IN | SYSTOLIC BLOOD PRESSURE: 130 MMHG

## 2023-12-14 DIAGNOSIS — Z00.00 ROUTINE MEDICAL EXAM: ICD-10-CM

## 2023-12-14 DIAGNOSIS — Z12.11 SCREENING FOR COLORECTAL CANCER: ICD-10-CM

## 2023-12-14 DIAGNOSIS — E78.5 HYPERLIPIDEMIA, UNSPECIFIED HYPERLIPIDEMIA TYPE: Primary | ICD-10-CM

## 2023-12-14 DIAGNOSIS — I44.2 CHB (COMPLETE HEART BLOCK): ICD-10-CM

## 2023-12-14 DIAGNOSIS — Z95.0 PRESENCE OF CARDIAC RESYNCHRONIZATION THERAPY PACEMAKER (CRT-P): ICD-10-CM

## 2023-12-14 DIAGNOSIS — R93.89 ABNORMAL CHEST X-RAY: ICD-10-CM

## 2023-12-14 DIAGNOSIS — F40.240 CLAUSTROPHOBIA: ICD-10-CM

## 2023-12-14 DIAGNOSIS — Z12.12 SCREENING FOR COLORECTAL CANCER: ICD-10-CM

## 2023-12-14 DIAGNOSIS — R73.9 HYPERGLYCEMIA: ICD-10-CM

## 2023-12-14 DIAGNOSIS — K21.9 GASTROESOPHAGEAL REFLUX DISEASE, UNSPECIFIED WHETHER ESOPHAGITIS PRESENT: ICD-10-CM

## 2023-12-14 DIAGNOSIS — N40.0 BENIGN PROSTATIC HYPERPLASIA, UNSPECIFIED WHETHER LOWER URINARY TRACT SYMPTOMS PRESENT: ICD-10-CM

## 2023-12-14 DIAGNOSIS — Z87.19 HISTORY OF DIVERTICULITIS: ICD-10-CM

## 2023-12-14 DIAGNOSIS — Z12.5 SCREENING FOR PROSTATE CANCER: ICD-10-CM

## 2023-12-14 PROCEDURE — 71046 X-RAY EXAM CHEST 2 VIEWS: CPT | Mod: TC,FY,PO

## 2023-12-14 PROCEDURE — 99999 PR PBB SHADOW E&M-EST. PATIENT-LVL III: ICD-10-PCS | Mod: PBBFAC,,, | Performed by: INTERNAL MEDICINE

## 2023-12-14 PROCEDURE — 71046 X-RAY EXAM CHEST 2 VIEWS: CPT | Mod: 26,,, | Performed by: RADIOLOGY

## 2023-12-14 PROCEDURE — 99215 OFFICE O/P EST HI 40 MIN: CPT | Mod: S$PBB,,, | Performed by: INTERNAL MEDICINE

## 2023-12-14 PROCEDURE — 71046 XR CHEST PA AND LATERAL: ICD-10-PCS | Mod: 26,,, | Performed by: RADIOLOGY

## 2023-12-14 PROCEDURE — 99215 PR OFFICE/OUTPT VISIT, EST, LEVL V, 40-54 MIN: ICD-10-PCS | Mod: S$PBB,,, | Performed by: INTERNAL MEDICINE

## 2023-12-14 PROCEDURE — 99999 PR PBB SHADOW E&M-EST. PATIENT-LVL III: CPT | Mod: PBBFAC,,, | Performed by: INTERNAL MEDICINE

## 2023-12-14 PROCEDURE — 99213 OFFICE O/P EST LOW 20 MIN: CPT | Mod: PBBFAC,PO,25 | Performed by: INTERNAL MEDICINE

## 2023-12-14 RX ORDER — LORAZEPAM 0.5 MG/1
0.5 TABLET ORAL EVERY 6 HOURS PRN
Qty: 30 TABLET | Refills: 3 | Status: SHIPPED | OUTPATIENT
Start: 2023-12-14 | End: 2024-01-13

## 2023-12-14 RX ORDER — POLYETHYLENE GLYCOL-3350 AND ELECTROLYTES 236; 6.74; 5.86; 2.97; 22.74 G/274.31G; G/274.31G; G/274.31G; G/274.31G; G/274.31G
POWDER, FOR SOLUTION ORAL
COMMUNITY
Start: 2023-10-04

## 2023-12-14 NOTE — PROGRESS NOTES
Chief complaint: Physical exam,     68-year-old white male.  From and health maintenance standpoint we reviewed his  labs.  His colonoscopy was normal in 2013 with a 10 year follow-up since previous polyps were hyperplastic.  Scope set for 1/24. Labs reviewed and unremarkable x LDL .  He is feeling good.    LDL is up -? Off med just to see and now back on it. No problem reassured about statin therapy is okay to continue.    On exam he does have some general decreased breath sounds on the right compared to the left.  Last chest x-ray year ago did show some mild fibrotic changes at the right base.  Chest x-ray about 10 years prior did not have such issues mentioned.    Patient does report he thinks he is claustrophobic.  He can not sit in the back seat of a car about a lot of anxiety, elevators are okay if the ride is short and not crowded.  He is concerned about riding on plain or a bus in his wife does want to start traveling.  1 time he had to do some form of cardiac scan was given something which we presume was probably something like Valium it did work well but started to wear off.  We did discuss looking up cognitive behavioral therapies that he can do on his own and if severe we always could refer to psychology.  Will also use some Ativan and had a long discussion regarding dose adjustments based upon with situation, based upon symptoms and response and so forth.  I encouraged him to try it and start putting himself in those particular situations that he no should precipitate claustrophobia and anxiety     Over 70 min  minutes of total time spent on the encounter, which includes face to face time and non-face to face time preparing to see the patient (eg, review of tests), Obtaining and/or reviewing separately obtained history, Documenting clinical information in the electronic or other health record, Independently interpreting results (not separately reported) and communicating results to the  patient/family/caregiver, or Care coordination (not separately reported).    ROS:   CONST: weight stable. EYES: no vision change. ENT: no sore throat. CV: no chest pain w/ exertion. RESP: no shortness of breath. GI: no nausea, vomiting, diarrhea. No dysphagia. : no urinary issues. MUSCULOSKELETAL: no new myalgias or arthralgias. SKIN: no new changes. NEURO: no focal deficits. PSYCH: no new issues. ENDOCRINE: no polyuria. HEME: no lymph nodes. ALLERGY: no general pruritis.          Past Medical History:   Diagnosis Date    2:1 Second degree AV block 03/05/2013    Cardiac pacemaker 03/06/2013    [facility]    Heart block 03/06/2013    S/p DC PM    Hyperlipidemia     Personal history of colonic polyps 12/30/2013    Normal 12/13 ---Repeat colonoscopy in 10 years for screening purposes. (Previous polyps were both hyperplastic)   Diverticulitis  Occasional GERD          Past Surgical History:   Procedure Laterality Date    EXCISIONAL HEMORRHOIDECTOMY N/A 6/7/2022    Procedure: HEMORRHOIDECTOMY;  Surgeon: Israel Hartley MD;  Location: [facility] OR [address] [place] [address];  Service: Colon and Rectal;  Laterality: N/A;    HERNIA REPAIR      IMPLANTATION OF BIVENTRICULAR PERMANENT PACEMAKER AS UPGRADE TO EXISTING PACEMAKER N/A 10/14/2019    Procedure: Biventricular pacemaker upgrade;  Surgeon: Jarod Hanson MD;  Location: [facility] EP LAB;  Service: Cardiology;  Laterality: N/A;  NICM, CRT-P upgrade, SJM, MAC, MB, 3 Prep *SJM PPM in situ*    inguinal hernia repair      x 2    INSERT / REPLACE / REMOVE PACEMAKER  03/06/2013    S/p DC PM/ replace w/Biventricular pacemaker upgrade 10/14/19    SHOULDER SURGERY Left        Social History     Social History    Marital status:      Spouse name: N/A    Number of children: 3 boys in college    Years of education: N/A     Occupational History    realator     Social History Main Topics    Smoking status: Former Smoker     Types: Cigars     Quit date: 7/23/1973    Smokeless tobacco:  Never Used    Alcohol use 6.0 oz/week     5 Glasses of wine, 5 Cans of beer per week      Comment: Daily    Drug use: No    Sexual activity: Not on file     Other Topics Concern    Not on file     Social History Narrative       family history includes Cancer in his father and mother; Diabetes in his mother.      Over 70 min  minutes of total time spent on the encounter, which includes face to face time and non-face to face time preparing to see the patient (eg, review of tests), Obtaining and/or reviewing separately obtained history, Documenting clinical information in the electronic or other health record, Independently interpreting results (not separately reported) and communicating results to the patient/family/caregiver, or Care coordination (not separately reported).      Gen: no distress  EYES: conjunctiva clear, non-icteric, PERRL  ENT: nose clear, nasal mucosa normal, oropharynx clear and moist, teeth good  NECK:supple, thyroid non-palpable  RESP: effort is good, lungs general decreased breath sounds on the right compared to the left.  No crackles or rhonchi  CV: heart RRR w/o murmur, gallops or rubs; no carotid bruits, no edema  GI: abdomen soft, non-distended, non-tender, no hepatosplenomegaly  MS: gait normal, no clubbing or cyanosis of the digits  SKIN: no rashes, warm to touch    JDiagnoses and all orders for this visit:    Hyperlipidemia, unspecified hyperlipidemia type, significant off medications and he has restarted    Routine medical exam, up-to-date on cancer screening and health maintenance    Screening for prostate cancer, normal PSA    Screening for colorectal cancer, up-to-date    Presence of cardiac resynchronization therapy pacemaker (CRT-P)    CHB (complete heart block), stable    Benign prostatic hyperplasia, unspecified whether lower urinary tract symptoms present, chronic and stable    Gastroesophageal reflux disease, unspecified whether esophagitis present, chronic and stable    History  of diverticulitis    Hyperglycemia, normal A1c    Claustrophobia, Ativan prescribed as well as cognitive behavioral therapies discussed    Abnormal chest x-ray and abnormal chest auscultation, update x-ray and discussed possibly needing CT scan of the chest for a better look at the lungs, he is a former smoker  -     X-Ray Chest PA And Lateral; Future    Other orders  -     LORazepam (ATIVAN) 0.5 MG tablet; Take 1 tablet (0.5 mg total) by mouth every 6 (six) hours as needed for Anxiety.

## 2023-12-15 ENCOUNTER — PATIENT MESSAGE (OUTPATIENT)
Dept: FAMILY MEDICINE | Facility: CLINIC | Age: 68
End: 2023-12-15
Payer: MEDICARE

## 2023-12-18 NOTE — TELEPHONE ENCOUNTER
Please advise,    Doc,   Good news about the x ray.  Thanks.     Also, I haven't heard from [facility] about that anxiety pill you prescribed.  Is there anything that I need to do to get it filled?     Thanks,   Esvin

## 2023-12-22 ENCOUNTER — TELEPHONE (OUTPATIENT)
Dept: FAMILY MEDICINE | Facility: CLINIC | Age: 68
End: 2023-12-22
Payer: MEDICARE

## 2023-12-22 NOTE — TELEPHONE ENCOUNTER
Spoke with patient after contacting the pharmacy and speaking with ERLINDA about his anxiety medication. They informed me that the Ativan is on back order and don't know when they will be receiving this medication. Patient said he can wait and will check with pharmacy to see when they will have this medication available.

## 2023-12-23 DIAGNOSIS — E78.5 HYPERLIPIDEMIA, UNSPECIFIED HYPERLIPIDEMIA TYPE: ICD-10-CM

## 2023-12-24 ENCOUNTER — CLINICAL SUPPORT (OUTPATIENT)
Dept: CARDIOLOGY | Facility: HOSPITAL | Age: 68
End: 2023-12-24
Payer: MEDICARE

## 2023-12-24 ENCOUNTER — CLINICAL SUPPORT (OUTPATIENT)
Dept: CARDIOLOGY | Facility: HOSPITAL | Age: 68
End: 2023-12-24
Attending: INTERNAL MEDICINE
Payer: MEDICARE

## 2023-12-24 DIAGNOSIS — I44.2 ATRIOVENTRICULAR BLOCK, COMPLETE: ICD-10-CM

## 2023-12-24 DIAGNOSIS — Z95.0 PRESENCE OF CARDIAC PACEMAKER: ICD-10-CM

## 2023-12-24 PROCEDURE — 93296 REM INTERROG EVL PM/IDS: CPT | Performed by: INTERNAL MEDICINE

## 2023-12-24 PROCEDURE — 93294 REM INTERROG EVL PM/LDLS PM: CPT | Mod: ,,, | Performed by: INTERNAL MEDICINE

## 2023-12-24 NOTE — TELEPHONE ENCOUNTER
No care due was identified.  [facility] Embedded Care Due Messages. Reference number: [id number].   12/23/2023 8:59:18 PM CST

## 2023-12-25 RX ORDER — SIMVASTATIN 40 MG/1
TABLET, FILM COATED ORAL
Qty: 90 TABLET | Refills: 3 | Status: SHIPPED | OUTPATIENT
Start: 2023-12-25

## 2023-12-25 NOTE — TELEPHONE ENCOUNTER
Refill Decision Note   Marcos Elaine  is requesting a refill authorization.  Brief Assessment and Rationale for Refill:  Approve     Medication Therapy Plan:       Medication Reconciliation Completed: No   Comments:     No Care Gaps recommended.     Note composed:2:42 PM 12/25/2023

## 2024-01-05 LAB
OHS CV AF BURDEN PERCENT: < 1
OHS CV BIV PACING PERCENT: 99 %
OHS CV DC REMOTE DEVICE TYPE: NORMAL

## 2024-01-18 ENCOUNTER — PATIENT MESSAGE (OUTPATIENT)
Dept: SURGERY | Facility: CLINIC | Age: 69
End: 2024-01-18
Payer: MEDICARE

## 2024-01-22 ENCOUNTER — ANESTHESIA EVENT (OUTPATIENT)
Dept: ENDOSCOPY | Facility: HOSPITAL | Age: 69
End: 2024-01-22
Payer: MEDICARE

## 2024-01-22 ENCOUNTER — ANESTHESIA (OUTPATIENT)
Dept: ENDOSCOPY | Facility: HOSPITAL | Age: 69
End: 2024-01-22
Payer: MEDICARE

## 2024-01-22 ENCOUNTER — HOSPITAL ENCOUNTER (OUTPATIENT)
Facility: HOSPITAL | Age: 69
Discharge: HOME | End: 2024-01-22
Attending: COLON & RECTAL SURGERY | Admitting: COLON & RECTAL SURGERY
Payer: MEDICARE

## 2024-01-22 VITALS
RESPIRATION RATE: 17 BRPM | OXYGEN SATURATION: 100 % | SYSTOLIC BLOOD PRESSURE: 156 MMHG | DIASTOLIC BLOOD PRESSURE: 77 MMHG | TEMPERATURE: 98 F | HEIGHT: 74 IN | BODY MASS INDEX: 21.82 KG/M2 | WEIGHT: 170 LBS | HEART RATE: 61 BPM

## 2024-01-22 DIAGNOSIS — Z12.11 COLON CANCER SCREENING: ICD-10-CM

## 2024-01-22 PROCEDURE — 63600175 PHARM REV CODE 636 W HCPCS: Performed by: NURSE ANESTHETIST, CERTIFIED REGISTERED

## 2024-01-22 PROCEDURE — 25000003 PHARM REV CODE 250: Performed by: NURSE PRACTITIONER

## 2024-01-22 PROCEDURE — 37000008 HC ANESTHESIA 1ST 15 MINUTES: Performed by: COLON & RECTAL SURGERY

## 2024-01-22 PROCEDURE — 25000003 PHARM REV CODE 250: Performed by: NURSE ANESTHETIST, CERTIFIED REGISTERED

## 2024-01-22 PROCEDURE — 37000009 HC ANESTHESIA EA ADD 15 MINS: Performed by: COLON & RECTAL SURGERY

## 2024-01-22 PROCEDURE — 45385 COLONOSCOPY W/LESION REMOVAL: CPT | Mod: PT,,, | Performed by: COLON & RECTAL SURGERY

## 2024-01-22 PROCEDURE — 45385 COLONOSCOPY W/LESION REMOVAL: CPT | Mod: PT | Performed by: COLON & RECTAL SURGERY

## 2024-01-22 PROCEDURE — 88305 TISSUE EXAM BY PATHOLOGIST: CPT | Mod: 26,,, | Performed by: PATHOLOGY

## 2024-01-22 PROCEDURE — 88305 TISSUE EXAM BY PATHOLOGIST: CPT | Performed by: PATHOLOGY

## 2024-01-22 PROCEDURE — E9220 PRA ENDO ANESTHESIA: HCPCS | Mod: PT,,, | Performed by: NURSE ANESTHETIST, CERTIFIED REGISTERED

## 2024-01-22 RX ORDER — LIDOCAINE HYDROCHLORIDE 20 MG/ML
INJECTION INTRAVENOUS
Status: DISCONTINUED | OUTPATIENT
Start: 2024-01-22 | End: 2024-01-22

## 2024-01-22 RX ORDER — SODIUM CHLORIDE 9 MG/ML
INJECTION, SOLUTION INTRAVENOUS CONTINUOUS
Status: DISCONTINUED | OUTPATIENT
Start: 2024-01-22 | End: 2024-01-22 | Stop reason: HOSPADM

## 2024-01-22 RX ORDER — PROPOFOL 10 MG/ML
VIAL (ML) INTRAVENOUS
Status: DISCONTINUED | OUTPATIENT
Start: 2024-01-22 | End: 2024-01-22

## 2024-01-22 RX ORDER — PROPOFOL 10 MG/ML
VIAL (ML) INTRAVENOUS CONTINUOUS PRN
Status: DISCONTINUED | OUTPATIENT
Start: 2024-01-22 | End: 2024-01-22

## 2024-01-22 RX ADMIN — PROPOFOL 70 MG: 10 INJECTION, EMULSION INTRAVENOUS at 09:01

## 2024-01-22 RX ADMIN — SODIUM CHLORIDE: 0.9 INJECTION, SOLUTION INTRAVENOUS at 09:01

## 2024-01-22 RX ADMIN — LIDOCAINE HYDROCHLORIDE 50 MG: 20 INJECTION INTRAVENOUS at 09:01

## 2024-01-22 RX ADMIN — PROPOFOL 150 MCG/KG/MIN: 10 INJECTION, EMULSION INTRAVENOUS at 09:01

## 2024-01-22 NOTE — TRANSFER OF CARE
"Anesthesia Transfer of Care Note    Patient: Marcos Elaine    Procedure(s) Performed: Procedure(s) (LRB):  COLONOSCOPY (N/A)    Patient location: GI    Anesthesia Type: general    Transport from OR: Transported from OR on room air with adequate spontaneous ventilation    Post pain: adequate analgesia    Post assessment: no apparent anesthetic complications and tolerated procedure well    Post vital signs: stable    Level of consciousness: awake    Nausea/Vomiting: no nausea/vomiting    Complications: none    Transfer of care protocol was followed      Last vitals: Visit Vitals  /62 (BP Location: Left arm, Patient Position: Lying)   Pulse 69   Temp 36.6 °C (97.9 °F) (Temporal)   Resp 17   Ht 6' 2" (1.88 m)   Wt 77.1 kg (170 lb)   SpO2 98%   BMI 21.83 kg/m²     "

## 2024-01-22 NOTE — PROVATION PATIENT INSTRUCTIONS
Discharge Summary/Instructions after an Endoscopic Procedure  Patient Name: Marcos Elaine  Patient MRN: [id number]  Patient YOB: 1955 Monday, January 22, 2024  Israel Hartley MD  Dear patient,  As a result of recent federal legislation (The Federal Cures Act), you may   receive lab or pathology results from your procedure in your [facility]   account before your physician is able to contact you. Your physician or   their representative will relay the results to you with their   recommendations at their soonest availability.  Thank you,  RESTRICTIONS:  During your procedure today, you received medications for sedation.  These   medications may affect your judgment, balance and coordination.  Therefore,   for 24 hours, you have the following restrictions:   - DO NOT drive a car, operate machinery, make legal/financial decisions,   sign important papers or drink alcohol.    ACTIVITY:  Today: no heavy lifting, straining or running due to procedural   sedation/anesthesia.  The following day: return to full activity including work.  DIET:  Eat and drink normally unless instructed otherwise.     TREATMENT FOR COMMON SIDE EFFECTS:  - Mild abdominal pain, nausea, belching, bloating or excessive gas:  rest,   eat lightly and use a heating pad.  - Sore Throat: treat with throat lozenges and/or gargle with warm salt   water.  - Because air was used during the procedure, expelling large amounts of air   from your rectum or belching is normal.  - If a bowel prep was taken, you may not have a bowel movement for 1-3 days.    This is normal.  SYMPTOMS TO WATCH FOR AND REPORT TO YOUR PHYSICIAN:  1. Abdominal pain or bloating, other than gas cramps.  2. Chest pain.  3. Back pain.  4. Signs of infection such as: chills or fever occurring within 24 hours   after the procedure.  5. Rectal bleeding, which would show as bright red, maroon, or black stools.   (A tablespoon of blood from the rectum is not serious, especially  if   hemorrhoids are present.)  6. Vomiting.  7. Weakness or dizziness.  GO DIRECTLY TO THE NEAREST EMERGENCY ROOM IF YOU HAVE ANY OF THE FOLLOWING:      Difficulty breathing              Chills and/or fever over 101 F   Persistent vomiting and/or vomiting blood   Severe abdominal pain   Severe chest pain   Black, tarry stools   Bleeding- more than one tablespoon   Any other symptom or condition that you feel may need urgent attention  Your doctor recommends these additional instructions:  If any biopsies were taken, your doctors clinic will contact you in 1 to 2   weeks with any results.  - Discharge patient to home (ambulatory).   - Patient has a contact number available for emergencies.  The signs and   symptoms of potential delayed complications were discussed with the   patient.  Return to normal activities tomorrow.  Written discharge   instructions were provided to the patient.   - Resume previous diet.   - Continue present medications.   - Return to primary care physician as previously scheduled.   - Repeat colonoscopy date to be determined after pending pathology results   are reviewed for surveillance.  For questions, problems or results please call your physician - Israel Hartley MD at Work:  [phone].  [facility], EMERGENCY ROOM PHONE NUMBER: [phone]  IF A COMPLICATION OR EMERGENCY SITUATION ARISES AND YOU ARE UNABLE TO REACH   YOUR PHYSICIAN - GO DIRECTLY TO THE EMERGENCY ROOM.  Israel Hartley MD  1/22/2024 10:13:55 AM  This report has been verified and signed electronically.  Dear patient,  As a result of recent federal legislation (The Federal Cures Act), you may   receive lab or pathology results from your procedure in your [facility]   account before your physician is able to contact you. Your physician or   their representative will relay the results to you with their   recommendations at their soonest availability.  Thank you,  PROVATION

## 2024-01-22 NOTE — H&P
Colonoscopy History and Physical      Procedure : Colonoscopy    Indications:  asymptomatic screening exam    Family Hx of CRC: None    Last Colonoscopy:      Hx of sedation problems: none  FHX of sedation problems: none    Past Medical History:   Diagnosis Date    2:1 Second degree AV block 2013    Cardiac pacemaker 2013    [facility]    Heart block 2013    S/p DC PM    Hyperlipidemia     Personal history of colonic polyps 2013    Normal  ---Repeat colonoscopy in 10 years for screening purposes. (Previous polyps were both hyperplastic)       Family History   Problem Relation Age of Onset    Diabetes Mother     Cancer Mother     Cancer Father        Social History     Socioeconomic History    Marital status:    Tobacco Use    Smoking status: Former     Types: Cigars     Quit date: 1973     Years since quittin.5     Passive exposure: Past    Smokeless tobacco: Never    Tobacco comments:     Patient Quit Smoking Cigars on 1973   Substance and Sexual Activity    Alcohol use: Yes     Alcohol/week: 7.0 standard drinks of alcohol     Types: 5 Glasses of wine, 2 Cans of beer per week     Comment: Daily 1-2    Drug use: No    Sexual activity: Yes     Partners: Female     Social Determinants of Health     Financial Resource Strain: Low Risk  (2023)    Overall Financial Resource Strain (CARDIA)     Difficulty of Paying Living Expenses: Not hard at all   Food Insecurity: No Food Insecurity (2023)    Hunger Vital Sign     Worried About Running Out of Food in the Last Year: Never true     Ran Out of Food in the Last Year: Never true   Transportation Needs: No Transportation Needs (2023)    PRAPARE - Transportation     Lack of Transportation (Medical): No     Lack of Transportation (Non-Medical): No   Physical Activity: Sufficiently Active (2023)    Exercise Vital Sign     Days of Exercise per Week: 5 days     Minutes of Exercise per Session: 120  min   Stress: No Stress Concern Present (12/7/2023)    [place] citizen [place] [employer] - Occupational Stress Questionnaire     Feeling of Stress : Not at all   Social Connections: Unknown (12/7/2023)    Social Connection and Isolation Panel [NHANES]     Frequency of Communication with Friends and Family: More than three times a week     Frequency of Social Gatherings with Friends and Family: More than three times a week     Active Member of Clubs or Organizations: Yes     Attends Club or Organization Meetings: More than 4 times per year     Marital Status:    Housing Stability: Low Risk  (12/7/2023)    Housing Stability Vital Sign     Unable to Pay for Housing in the Last Year: No     Number of Places Lived in the Last Year: 1     Unstable Housing in the Last Year: No       Review of patient's allergies indicates:  No Known Allergies    Current Facility-Administered Medications on File Prior to Encounter   Medication Dose Route Frequency Provider Last Rate Last Admin    0.9%  NaCl infusion   Intravenous Continuous Elsa Young NP   New Bag at 01/22/24 0924    0.9%  NaCl infusion   Intravenous Continuous Keiko De La Paz NP        mupirocin 2 % ointment   Nasal On Call Procedure Keiko De La Paz NP   Given at 06/07/22 1054     Current Outpatient Medications on File Prior to Encounter   Medication Sig Dispense Refill    CALCIUM POLYCARBOPHIL (KONSYL FIBER ORAL) Take by mouth.      glucosamine-chondroitin 500-400 mg tablet Take 1 tablet by mouth 3 (three) times daily.      multivitamin capsule Take 1 capsule by mouth once daily.         Review of Systems -   Respiratory ROS: negative  Cardiovascular ROS: negative  Gastrointestinal ROS: negative  Musculoskeletal ROS: negative  Neurological ROS: negative        Physical Exam:  General: no distress  Head: normocephalic  Lungs:  normal respiratory effort  Heart: regular rate  Abdomen: soft,  Non-tender  Extremities: warm and well  perfused       Deep Sedation: Mallampati Score per anesthesia    ASA: III        Anesthesia/Surgery risks, benefits, and alternative options discussed and understood by patient/family.

## 2024-01-22 NOTE — ANESTHESIA POSTPROCEDURE EVALUATION
Anesthesia Post Evaluation    Patient: Marcos Elaine    Procedure(s) Performed: Procedure(s) (LRB):  COLONOSCOPY (N/A)    Final Anesthesia Type: general      Patient location during evaluation: GI PACU  Patient participation: Yes- Able to Participate  Level of consciousness: awake and alert and oriented  Post-procedure vital signs: reviewed and stable  Pain management: adequate  Airway patency: patent    PONV status at discharge: No PONV  Anesthetic complications: no      Cardiovascular status: hemodynamically stable  Respiratory status: unassisted, spontaneous ventilation and room air  Hydration status: euvolemic  Follow-up not needed.              Vitals Value Taken Time   /77 01/22/24 1045   Temp 36.6 °C (97.9 °F) 01/22/24 1015   Pulse 61 01/22/24 1045   Resp 17 01/22/24 1045   SpO2 100 % 01/22/24 1045         Event Time   Out of Recovery 10:50:52         Pain/Amaya Score: Amaya Score: 10 (1/22/2024 10:16 AM)

## 2024-01-22 NOTE — ANESTHESIA PREPROCEDURE EVALUATION
01/22/2024  Marcos Elaine is a 68 y.o., male.      Pre-op Assessment    I have reviewed the Patient Summary Reports.     I have reviewed the Nursing Notes. I have reviewed the NPO Status.   I have reviewed the Medications.     Review of Systems  Anesthesia Hx:  No problems with previous Anesthesia   History of prior surgery of interest to airway management or planning:  Previous anesthesia: General          Denies Personal Hx of Anesthesia complications.                    Cardiovascular:    Pacemaker     Dysrhythmias          ECG has been reviewed. Complete heart-block with pacemaker                         Pulmonary:  Pulmonary Normal                       Renal/:  Renal/ Normal                 Hepatic/GI:     GERD             Neurological:  Neurology Normal                                      Endocrine:  Endocrine Normal              Patient Active Problem List   Diagnosis    CHB (complete heart block)    Hyperlipidemia    Presence of cardiac resynchronization therapy pacemaker (CRT-P)    Special screening for malignant neoplasms, colon    Personal history of colonic polyps    History of diverticulitis    Gastroesophageal reflux disease    History of cardiomyopathy - recovered after CRT       Past Medical History:   Diagnosis Date    2:1 Second degree AV block 03/05/2013    Cardiac pacemaker 03/06/2013    [facility]    Heart block 03/06/2013    S/p DC PM    Hyperlipidemia     Personal history of colonic polyps 12/30/2013    Normal 12/13 ---Repeat colonoscopy in 10 years for screening purposes. (Previous polyps were both hyperplastic)     Past Surgical History:   Procedure Laterality Date    EXCISIONAL HEMORRHOIDECTOMY N/A 6/7/2022    Procedure: HEMORRHOIDECTOMY;  Surgeon: Israel Hartley MD;  Location: [facility] OR [address] [place] [address];  Service: Colon and Rectal;  Laterality: N/A;    HERNIA REPAIR       IMPLANTATION OF BIVENTRICULAR PERMANENT PACEMAKER AS UPGRADE TO EXISTING PACEMAKER N/A 10/14/2019    Procedure: Biventricular pacemaker upgrade;  Surgeon: Jarod Hanson MD;  Location: [facility];  Service: Cardiology;  Laterality: N/A;  NICM, CRT-P upgrade, SJM, MAC, MB, 3 Prep *SJM PPM in situ*    inguinal hernia repair      x 2    INSERT / REPLACE / REMOVE PACEMAKER  03/06/2013    S/p DC PM/ replace w/Biventricular pacemaker upgrade 10/14/19    SHOULDER SURGERY Left          Physical Exam  General: Well nourished, Cooperative and Alert    Airway:  Mallampati: II   Mouth Opening: Normal  TM Distance: Normal  Tongue: Normal  Neck ROM: Normal ROM    Dental:  Intact    Chest/Lungs:  Normal Respiratory Rate    Heart:  Rate: Normal  Rhythm: Regular Rhythm        Anesthesia Plan  Type of Anesthesia, risks & benefits discussed:    Anesthesia Type: Gen Natural Airway, Gen ETT, MAC  Intra-op Monitoring Plan: Standard ASA Monitors  Post Op Pain Control Plan: multimodal analgesia  Induction:  IV  Airway Plan: Direct, Post-Induction  Informed Consent: Informed consent signed with the Patient and all parties understand the risks and agree with anesthesia plan.  All questions answered.   ASA Score: 3  Day of Surgery Review of History & Physical: H&P Update referred to the surgeon/provider.    Ready For Surgery From Anesthesia Perspective.     .

## 2024-01-25 LAB
FINAL PATHOLOGIC DIAGNOSIS: NORMAL
GROSS: NORMAL
Lab: NORMAL

## 2024-02-01 ENCOUNTER — PATIENT MESSAGE (OUTPATIENT)
Dept: ADMINISTRATIVE | Facility: OTHER | Age: 69
End: 2024-02-01
Payer: MEDICARE

## 2024-03-05 ENCOUNTER — OFFICE VISIT (OUTPATIENT)
Dept: OPTOMETRY | Facility: CLINIC | Age: 69
End: 2024-03-05
Payer: MEDICARE

## 2024-03-05 DIAGNOSIS — H52.4 MYOPIA OF BOTH EYES WITH ASTIGMATISM AND PRESBYOPIA: ICD-10-CM

## 2024-03-05 DIAGNOSIS — H52.13 MYOPIA OF BOTH EYES WITH ASTIGMATISM AND PRESBYOPIA: ICD-10-CM

## 2024-03-05 DIAGNOSIS — H25.13 NUCLEAR SCLEROSIS, BILATERAL: Primary | ICD-10-CM

## 2024-03-05 DIAGNOSIS — H52.203 MYOPIA OF BOTH EYES WITH ASTIGMATISM AND PRESBYOPIA: ICD-10-CM

## 2024-03-05 DIAGNOSIS — H40.013 OAG (OPEN ANGLE GLAUCOMA) SUSPECT, LOW RISK, BILATERAL: ICD-10-CM

## 2024-03-05 PROCEDURE — 99213 OFFICE O/P EST LOW 20 MIN: CPT | Mod: PBBFAC,PO | Performed by: OPTOMETRIST

## 2024-03-05 PROCEDURE — 99999 PR PBB SHADOW E&M-EST. PATIENT-LVL III: CPT | Mod: PBBFAC,,, | Performed by: OPTOMETRIST

## 2024-03-05 PROCEDURE — 92015 DETERMINE REFRACTIVE STATE: CPT | Mod: ,,, | Performed by: OPTOMETRIST

## 2024-03-05 PROCEDURE — 92014 COMPRE OPH EXAM EST PT 1/>: CPT | Mod: S$PBB,,, | Performed by: OPTOMETRIST

## 2024-03-24 ENCOUNTER — CLINICAL SUPPORT (OUTPATIENT)
Dept: CARDIOLOGY | Facility: HOSPITAL | Age: 69
End: 2024-03-24
Payer: MEDICARE

## 2024-03-24 ENCOUNTER — CLINICAL SUPPORT (OUTPATIENT)
Dept: CARDIOLOGY | Facility: HOSPITAL | Age: 69
End: 2024-03-24
Attending: INTERNAL MEDICINE
Payer: MEDICARE

## 2024-03-24 DIAGNOSIS — Z95.0 PRESENCE OF CARDIAC PACEMAKER: ICD-10-CM

## 2024-03-24 DIAGNOSIS — I44.2 ATRIOVENTRICULAR BLOCK, COMPLETE: ICD-10-CM

## 2024-03-24 PROCEDURE — 93294 REM INTERROG EVL PM/LDLS PM: CPT | Mod: ,,, | Performed by: INTERNAL MEDICINE

## 2024-04-11 LAB
OHS CV AF BURDEN PERCENT: < 1
OHS CV BIV PACING PERCENT: 99 %
OHS CV DC REMOTE DEVICE TYPE: NORMAL

## 2024-05-28 DIAGNOSIS — Z00.00 ENCOUNTER FOR MEDICARE ANNUAL WELLNESS EXAM: ICD-10-CM

## 2024-06-23 ENCOUNTER — CLINICAL SUPPORT (OUTPATIENT)
Dept: CARDIOLOGY | Facility: HOSPITAL | Age: 69
End: 2024-06-23
Payer: MEDICARE

## 2024-06-23 ENCOUNTER — CLINICAL SUPPORT (OUTPATIENT)
Dept: CARDIOLOGY | Facility: HOSPITAL | Age: 69
End: 2024-06-23
Attending: INTERNAL MEDICINE
Payer: MEDICARE

## 2024-06-23 DIAGNOSIS — I44.2 ATRIOVENTRICULAR BLOCK, COMPLETE: ICD-10-CM

## 2024-06-23 DIAGNOSIS — Z95.0 PRESENCE OF CARDIAC PACEMAKER: ICD-10-CM

## 2024-06-23 PROCEDURE — 93294 REM INTERROG EVL PM/LDLS PM: CPT | Mod: ,,, | Performed by: INTERNAL MEDICINE

## 2024-07-06 LAB
OHS CV AF BURDEN PERCENT: < 1
OHS CV BIV PACING PERCENT: 99 %
OHS CV DC REMOTE DEVICE TYPE: NORMAL

## 2024-08-22 ENCOUNTER — OFFICE VISIT (OUTPATIENT)
Dept: FAMILY MEDICINE | Facility: CLINIC | Age: 69
End: 2024-08-22
Payer: MEDICARE

## 2024-08-22 VITALS
OXYGEN SATURATION: 97 % | WEIGHT: 158.81 LBS | SYSTOLIC BLOOD PRESSURE: 132 MMHG | BODY MASS INDEX: 20.38 KG/M2 | TEMPERATURE: 98 F | HEART RATE: 63 BPM | HEIGHT: 74 IN | DIASTOLIC BLOOD PRESSURE: 62 MMHG

## 2024-08-22 DIAGNOSIS — Z71.89 ADVANCED DIRECTIVES, COUNSELING/DISCUSSION: ICD-10-CM

## 2024-08-22 DIAGNOSIS — I44.2 CHB (COMPLETE HEART BLOCK): ICD-10-CM

## 2024-08-22 DIAGNOSIS — Z13.6 ENCOUNTER FOR ABDOMINAL AORTIC ANEURYSM (AAA) SCREENING: ICD-10-CM

## 2024-08-22 DIAGNOSIS — Z00.00 ENCOUNTER FOR PREVENTIVE HEALTH EXAMINATION: ICD-10-CM

## 2024-08-22 DIAGNOSIS — Z00.00 ENCOUNTER FOR MEDICARE ANNUAL WELLNESS EXAM: Primary | ICD-10-CM

## 2024-08-22 DIAGNOSIS — E78.5 HYPERLIPIDEMIA, UNSPECIFIED HYPERLIPIDEMIA TYPE: ICD-10-CM

## 2024-08-22 DIAGNOSIS — I70.0 AORTIC ATHEROSCLEROSIS: ICD-10-CM

## 2024-08-22 DIAGNOSIS — K21.9 GASTROESOPHAGEAL REFLUX DISEASE, UNSPECIFIED WHETHER ESOPHAGITIS PRESENT: ICD-10-CM

## 2024-08-22 PROCEDURE — 99214 OFFICE O/P EST MOD 30 MIN: CPT | Mod: PBBFAC,PO | Performed by: NURSE PRACTITIONER

## 2024-08-22 PROCEDURE — 99999 PR PBB SHADOW E&M-EST. PATIENT-LVL IV: CPT | Mod: PBBFAC,,, | Performed by: NURSE PRACTITIONER

## 2024-08-22 NOTE — PATIENT INSTRUCTIONS
Counseling and Referral of Other Preventative  (Italic type indicates deductible and co-insurance are waived)    Patient Name: Marcos Elaine  Today's Date: 8/22/2024    Health Maintenance       Date Due Completion Date    TETANUS VACCINE Never done ---    RSV Vaccine (Age 60+ and Pregnant patients) (1 - 1-dose 60+ series) Never done ---    Abdominal Aortic Aneurysm Screening Never done ---    COVID-19 Vaccine (6 - 2023-24 season) 02/24/2024 10/24/2023    Influenza Vaccine (1) 09/01/2024 10/9/2023    Colorectal Cancer Screening 01/22/2031 1/22/2024        Orders Placed This Encounter   Procedures    US AAA Screening       The following information is provided to all patients.  This information is to help you find resources for any of the problems found today that may be affecting your health:                  Living healthy guide: www.[facility].[place].gov      Understanding Diabetes: www.diabetes.org      Eating healthy: www.cdc.gov/healthyweight      CDC home safety checklist: www.cdc.gov/steadi/patient.html      Agency on Aging: www.goea.[place].gov      Alcoholics anonymous (AA): www.aa.org      Physical Activity: www.[place]ola.nih.gov/oi8zcau      Tobacco use: www.quitwithusla.org

## 2024-08-22 NOTE — PROGRESS NOTES
HPI     Chief Complaint:  AWV    Marcos Elaine is a 68 y.o. male with multiple medical diagnoses as listed in the medical history and problem list that presented for a Medicare AWV and comprehensive Health Risk Assessment today.      The following components were reviewed and updated:    Medical history  Family History  Social history  Allergies and Current Medications  Health Risk Assessment  Health Maintenance  Care Team     HPI      Assessment & Plan       Diagnoses and health risks identified today and associated recommendations/orders:    Problem List Items Addressed This Visit          Cardiac/Vascular    Aortic atherosclerosis    -assessed and addressed all modifiable risk factors.  Continue with appropriate management to prevent complications with regards to lipid and BP monitoring.      CHB (complete heart block)    The current medical regimen is effective;  continue present plan      Hyperlipidemia    discussed ways to lower triglycerides such as cutting simple sugars out of diet (white breads, candies, cookies, cakes, etc.) and reducing/eliminating intake of highly processed trans fatty acids.   Exercise 30 minutes a day for 4-5 days a week.   Eat more fiber.         GI    Gastroesophageal reflux disease    -stable, discussed with patient about avoiding potential dietary triggers  -avoid spicy/greasy/sour/acidic foods, as well as tea/coffee/chocolate if possible  -Tylenol as needed for pain, avoid NSAIDs  -Keep food diary       Other Visit Diagnoses       Encounter for Medicare annual wellness exam    -  Primary    Counseled on age appropriate medical preventative services including age appropriate cancer screenings, age appropriate eye and dental exams, over all nutritional health, need for a consistent exercise regimen, and an over all push towards maintaining a vigorous and active lifestyle.  Counseled on age appropriate vaccines and discussed upcoming health care needs based on age/gender.  Discussed good sleep hygiene and stress management.      Encounter for abdominal aortic aneurysm (AAA) screening        US ordered    Relevant Orders    US AAA Screening    Encounter for preventive health examination        Counseled on age appropriate medical preventative services including age appropriate cancer screenings, age appropriate eye and dental exams, over all nutritional health, need for a consistent exercise regimen, and an over all push towards maintaining a vigorous and active lifestyle.  Counseled on age appropriate vaccines and discussed upcoming health care needs based on age/gender. Discussed good sleep hygiene and stress management.      Advanced directives, counseling/discussion        I offered to discuss advanced care planning, including how to pick a person who would make decisions for you if you were unable to make them for yourself, called a health care power of , and what kind of decisions you might make such as use of life sustaining treatments such as ventilators and tube feeding when faced with a life limiting illness recorded on a living will that they will need to know. (How you want to be cared for as you near the end of your natural life)     X Patient is interested in learning more about how to make advanced directives.  I provided them paperwork and offered to discuss this with them.                --------------------------------------------    ** See Completed Assessments for Annual Wellness Visit within the encounter summary.**    The following assessments were completed:  Living Situation  CAGE  Depression Screening  Timed Get Up and Go  Whisper Test  Cognitive Function Screening  Nutrition Screening  ADL Screening  PAQ Screening      Provided Marcos Elaine  with a 5-10 year written screening schedule and personal prevention plan. Recommendations were developed using the USPSTF age appropriate recommendations. Education, counseling, and referrals were provided as  "needed. After Visit Summary printed and given to patient which includes a list of additional screenings\tests needed.    Review for Opioid Screening: Pt does not have Rx for Opioids     Review for Substance Use Disorders: Patient does not abuse use substances    Exam     Review of Systems:  (as noted above)  Review of Systems   Constitutional:  Negative for fever.   HENT:  Negative for trouble swallowing.    Eyes:  Negative for visual disturbance.   Respiratory:  Negative for chest tightness and shortness of breath.    Cardiovascular:  Negative for chest pain.   Gastrointestinal:  Negative for blood in stool.       Physical Exam:   Physical Exam  Constitutional:       General: He is not in acute distress.     Appearance: He is not ill-appearing or diaphoretic.   HENT:      Head: Normocephalic and atraumatic.   Eyes:      General: No scleral icterus.  Cardiovascular:      Rate and Rhythm: Normal rate and regular rhythm.   Pulmonary:      Effort: No respiratory distress.   Chest:      Chest wall: No tenderness.   Neurological:      General: No focal deficit present.      Mental Status: He is alert and oriented to person, place, and time.       Vitals:    08/22/24 1523   BP: 132/62   BP Location: Left arm   Patient Position: Sitting   BP Method: Small (Manual)   Pulse: 63   Temp: 97.9 °F (36.6 °C)   TempSrc: Oral   SpO2: 97%   Weight: 72 kg (158 lb 13.5 oz)   Height: 6' 2" (1.88 m)      Body mass index is 20.39 kg/m².    Clock:              Health Maintenance:  Health Maintenance         Date Due Completion Date    TETANUS VACCINE Never done ---    RSV Vaccine (Age 60+ and Pregnant patients) (1 - 1-dose 60+ series) Never done ---    Abdominal Aortic Aneurysm Screening Never done ---    COVID-19 Vaccine (6 - 2023-24 season) 02/24/2024 10/24/2023    Influenza Vaccine (1) 09/01/2024 10/9/2023    High Dose Statin 01/22/2025 1/22/2024    Colorectal Cancer Screening 01/22/2031 1/22/2024            Advised patient on the " importance of completing overdue health maintenance items and AAA screening ordered      Follow Up:  Follow up in about 3 months (around 2024).    History     Past Medical History:  Past Medical History:   Diagnosis Date    2:1 Second degree AV block 2013    Cardiac pacemaker 2013    [facility]    Heart block 2013    S/p DC PM    Hyperlipidemia     Personal history of colonic polyps 2013    Normal  ---Repeat colonoscopy in 10 years for screening purposes. (Previous polyps were both hyperplastic)       Past Surgical History:  Past Surgical History:   Procedure Laterality Date    COLONOSCOPY N/A 2024    Procedure: COLONOSCOPY;  Surgeon: Israel Hartley MD;  Location: [facility] ENDO (4TH FLR);  Service: Endoscopy;  Laterality: N/A;  Referred by Dr. Gonzalez, pt requesting Dr. Hartley, [facility] Pacemaker, PEG, portal -ml  - precall complete - ERW    EXCISIONAL HEMORRHOIDECTOMY N/A 2022    Procedure: HEMORRHOIDECTOMY;  Surgeon: Israel Hartley MD;  Location: [facility] OR 2ND FLR;  Service: Colon and Rectal;  Laterality: N/A;    HERNIA REPAIR      IMPLANTATION OF BIVENTRICULAR PERMANENT PACEMAKER AS UPGRADE TO EXISTING PACEMAKER N/A 10/14/2019    Procedure: Biventricular pacemaker upgrade;  Surgeon: Jarod Hanson MD;  Location: [facility] EP LAB;  Service: Cardiology;  Laterality: N/A;  NICM, CRT-P upgrade, SJM, MAC, MB, 3 Prep *SJM PPM in situ*    inguinal hernia repair      x 2    INSERT / REPLACE / REMOVE PACEMAKER  2013    S/p DC PM/ replace w/Biventricular pacemaker upgrade 10/14/19    SHOULDER SURGERY Left        Social History:  Social History     Socioeconomic History    Marital status:    Tobacco Use    Smoking status: Former     Types: Cigars     Quit date: 1973     Years since quittin.1     Passive exposure: Past    Smokeless tobacco: Never    Tobacco comments:     Patient Quit Smoking Cigars on 1973   Substance and Sexual  Activity    Alcohol use: Yes     Alcohol/week: 7.0 standard drinks of alcohol     Types: 5 Glasses of wine, 2 Cans of beer per week     Comment: Daily 1-2    Drug use: No    Sexual activity: Yes     Partners: Female     Social Determinants of Health     Financial Resource Strain: Low Risk  (2024)    Overall Financial Resource Strain (CARDIA)     Difficulty of Paying Living Expenses: Not hard at all   Food Insecurity: No Food Insecurity (2024)    Hunger Vital Sign     Worried About Running Out of Food in the Last Year: Never true     Ran Out of Food in the Last Year: Never true   Transportation Needs: No Transportation Needs (2024)    PRAPARE - Transportation     Lack of Transportation (Medical): No     Lack of Transportation (Non-Medical): No   Physical Activity: Sufficiently Active (2024)    Exercise Vital Sign     Days of Exercise per Week: 7 days     Minutes of Exercise per Session: 150+ min   Stress: No Stress Concern Present (2024)    [employer] - Occupational Stress Questionnaire     Feeling of Stress : Not at all   Housing Stability: Low Risk  (2024)    Housing Stability Vital Sign     Unable to Pay for Housing in the Last Year: No     Homeless in the Last Year: No       Family History:  Family History   Problem Relation Name Age of Onset    Diabetes Mother      Cancer Mother      Cancer Father         Allergies and Medications: (updated and reviewed)  Review of patient's allergies indicates:  No Known Allergies  Current Outpatient Medications   Medication Sig Dispense Refill    CALCIUM POLYCARBOPHIL (KONSYL FIBER ORAL) Take by mouth.      GAVILYTE-G 236-22.74-6.74 -5.86 gram suspension SMARTSI Milliliter(s) By Mouth Daily      glucosamine-chondroitin 500-400 mg tablet Take 1 tablet by mouth 3 (three) times daily.      multivitamin capsule Take 1 capsule by mouth once daily.      simvastatin (ZOCOR) 40 MG tablet Take 1 tablet by mouth once  daily 90 tablet 3    LORazepam (ATIVAN) 0.5 MG tablet Take 1 tablet (0.5 mg total) by mouth every 6 (six) hours as needed for Anxiety. 30 tablet 3     No current facility-administered medications for this visit.     Facility-Administered Medications Ordered in Other Visits   Medication Dose Route Frequency Provider Last Rate Last Admin    0.9%  NaCl infusion   Intravenous Continuous Elsa Young NP   New Bag at 01/22/24 0924    0.9%  NaCl infusion   Intravenous Continuous Keiko De La Paz NP        mupirocin 2 % ointment   Nasal On Call Procedure Keiko De La Paz NP   Given at 06/07/22 1054           - The patient is given an After Visit Summary that lists all medications with directions, allergies, education, orders placed during this encounter and follow-up instructions.      - I have reviewed the patient's medical information including past medical, family, and social history sections including the medications and allergies.      - We discussed the patient's current medications.     This note was created by combination of typed  and MModal dictation.  Transcription errors may be present.  If there are any questions, please contact me.       Shorty Barlow NP

## 2024-09-22 ENCOUNTER — CLINICAL SUPPORT (OUTPATIENT)
Dept: CARDIOLOGY | Facility: HOSPITAL | Age: 69
End: 2024-09-22
Payer: MEDICARE

## 2024-09-22 ENCOUNTER — CLINICAL SUPPORT (OUTPATIENT)
Dept: CARDIOLOGY | Facility: HOSPITAL | Age: 69
End: 2024-09-22
Attending: INTERNAL MEDICINE
Payer: MEDICARE

## 2024-09-22 DIAGNOSIS — I44.2 ATRIOVENTRICULAR BLOCK, COMPLETE: ICD-10-CM

## 2024-09-22 DIAGNOSIS — Z95.0 PRESENCE OF CARDIAC PACEMAKER: ICD-10-CM

## 2024-09-22 PROCEDURE — 93294 REM INTERROG EVL PM/LDLS PM: CPT | Mod: ,,, | Performed by: INTERNAL MEDICINE

## 2024-10-21 LAB
OHS CV AF BURDEN PERCENT: < 1
OHS CV BIV PACING PERCENT: 99 %
OHS CV DC REMOTE DEVICE TYPE: NORMAL
OHS CV ICD SHOCK: NO

## 2024-12-22 ENCOUNTER — CLINICAL SUPPORT (OUTPATIENT)
Dept: CARDIOLOGY | Facility: HOSPITAL | Age: 69
End: 2024-12-22
Payer: MEDICARE

## 2024-12-22 ENCOUNTER — CLINICAL SUPPORT (OUTPATIENT)
Dept: CARDIOLOGY | Facility: HOSPITAL | Age: 69
End: 2024-12-22
Attending: INTERNAL MEDICINE
Payer: MEDICARE

## 2024-12-22 DIAGNOSIS — Z95.0 PRESENCE OF CARDIAC PACEMAKER: ICD-10-CM

## 2024-12-22 DIAGNOSIS — I44.2 ATRIOVENTRICULAR BLOCK, COMPLETE: ICD-10-CM

## 2024-12-22 PROCEDURE — 93294 REM INTERROG EVL PM/LDLS PM: CPT | Mod: ,,, | Performed by: INTERNAL MEDICINE

## 2025-01-05 DIAGNOSIS — E78.5 HYPERLIPIDEMIA, UNSPECIFIED HYPERLIPIDEMIA TYPE: ICD-10-CM

## 2025-01-06 NOTE — TELEPHONE ENCOUNTER
Refill Routing Note   Medication(s) are not appropriate for processing by [facility] for the following reason(s):        Required labs outdated    [facility] action(s):  Defer   Requires appointment : Yes     Requires labs : Yes             Appointments  past 12m or future 3m with PCP    Date Provider   Last Visit   12/14/2023 Hiren Gonzalez MD   Next Visit   Visit date not found Hiren Gonzalez MD   ED visits in past 90 days: 0        Note composed:2:20 PM 01/06/2025

## 2025-01-06 NOTE — TELEPHONE ENCOUNTER
Care Due:                  Date            Visit Type   Department     Provider  --------------------------------------------------------------------------------                                MYCHART                              ANNUAL       [facility]                              CHECKUP/PHY  MED/ INTERNAL  Hiren Ramirez  Last Visit: 12-      S            MED/ PEDS      Ehrensing  Next Visit: None Scheduled  None         None Found                                                            Last  Test          Frequency    Reason                     Performed    Due Date  --------------------------------------------------------------------------------    Office Visit  15 months..  simvastatin..............  12- 03-    CMP.........  12 months..  simvastatin..............  12- 12-    Lipid Panel.  12 months..  simvastatin..............  12- 12-    [employer] Embedded Care Due Messages. Reference number: [id number].   1/05/2025 9:27:39 PM CST

## 2025-01-07 RX ORDER — SIMVASTATIN 40 MG/1
TABLET, FILM COATED ORAL
Qty: 90 TABLET | Refills: 0 | Status: SHIPPED | OUTPATIENT
Start: 2025-01-07

## 2025-01-14 LAB
OHS CV AF BURDEN PERCENT: < 1
OHS CV BIV PACING PERCENT: 99 %
OHS CV DC REMOTE DEVICE TYPE: NORMAL

## 2025-02-03 DIAGNOSIS — Z95.0 PRESENCE OF CARDIAC RESYNCHRONIZATION THERAPY PACEMAKER (CRT-P): Primary | ICD-10-CM

## 2025-02-03 DIAGNOSIS — I44.2 CHB (COMPLETE HEART BLOCK): ICD-10-CM

## 2025-02-14 ENCOUNTER — PATIENT MESSAGE (OUTPATIENT)
Dept: FAMILY MEDICINE | Facility: CLINIC | Age: 70
End: 2025-02-14
Payer: MEDICARE

## 2025-02-14 DIAGNOSIS — N40.0 BENIGN PROSTATIC HYPERPLASIA, UNSPECIFIED WHETHER LOWER URINARY TRACT SYMPTOMS PRESENT: ICD-10-CM

## 2025-02-14 DIAGNOSIS — E78.5 HYPERLIPIDEMIA, UNSPECIFIED HYPERLIPIDEMIA TYPE: Primary | ICD-10-CM

## 2025-02-15 ENCOUNTER — LAB VISIT (OUTPATIENT)
Dept: LAB | Facility: HOSPITAL | Age: 70
End: 2025-02-15
Payer: MEDICARE

## 2025-02-15 DIAGNOSIS — E78.5 HYPERLIPIDEMIA, UNSPECIFIED HYPERLIPIDEMIA TYPE: ICD-10-CM

## 2025-02-15 DIAGNOSIS — N40.0 BENIGN PROSTATIC HYPERPLASIA, UNSPECIFIED WHETHER LOWER URINARY TRACT SYMPTOMS PRESENT: ICD-10-CM

## 2025-02-15 LAB
ALBUMIN SERPL BCP-MCNC: 3.7 G/DL (ref 3.5–5.2)
ALP SERPL-CCNC: 83 U/L (ref 40–150)
ALT SERPL W/O P-5'-P-CCNC: 25 U/L (ref 10–44)
ANION GAP SERPL CALC-SCNC: 9 MMOL/L (ref 8–16)
AST SERPL-CCNC: 48 U/L (ref 10–40)
BASOPHILS # BLD AUTO: 0.04 K/UL (ref 0–0.2)
BASOPHILS NFR BLD: 0.7 % (ref 0–1.9)
BILIRUB SERPL-MCNC: 0.5 MG/DL (ref 0.1–1)
BUN SERPL-MCNC: 11 MG/DL (ref 8–23)
CALCIUM SERPL-MCNC: 9.3 MG/DL (ref 8.7–10.5)
CHLORIDE SERPL-SCNC: 106 MMOL/L (ref 95–110)
CHOLEST SERPL-MCNC: 180 MG/DL (ref 120–199)
CHOLEST/HDLC SERPL: 2.8 {RATIO} (ref 2–5)
CO2 SERPL-SCNC: 24 MMOL/L (ref 23–29)
COMPLEXED PSA SERPL-MCNC: 3 NG/ML (ref 0–4)
CREAT SERPL-MCNC: 0.9 MG/DL (ref 0.5–1.4)
DIFFERENTIAL METHOD BLD: NORMAL
EOSINOPHIL # BLD AUTO: 0.2 K/UL (ref 0–0.5)
EOSINOPHIL NFR BLD: 3.2 % (ref 0–8)
ERYTHROCYTE [DISTWIDTH] IN BLOOD BY AUTOMATED COUNT: 13.3 % (ref 11.5–14.5)
EST. GFR  (NO RACE VARIABLE): >60 ML/MIN/1.73 M^2
GLUCOSE SERPL-MCNC: 101 MG/DL (ref 70–110)
HCT VFR BLD AUTO: 46.9 % (ref 40–54)
HDLC SERPL-MCNC: 65 MG/DL (ref 40–75)
HDLC SERPL: 36.1 % (ref 20–50)
HGB BLD-MCNC: 15.2 G/DL (ref 14–18)
IMM GRANULOCYTES # BLD AUTO: 0.02 K/UL (ref 0–0.04)
IMM GRANULOCYTES NFR BLD AUTO: 0.4 % (ref 0–0.5)
LDLC SERPL CALC-MCNC: 97.4 MG/DL (ref 63–159)
LYMPHOCYTES # BLD AUTO: 1.4 K/UL (ref 1–4.8)
LYMPHOCYTES NFR BLD: 24.8 % (ref 18–48)
MCH RBC QN AUTO: 30.1 PG (ref 27–31)
MCHC RBC AUTO-ENTMCNC: 32.4 G/DL (ref 32–36)
MCV RBC AUTO: 93 FL (ref 82–98)
MONOCYTES # BLD AUTO: 0.5 K/UL (ref 0.3–1)
MONOCYTES NFR BLD: 8.8 % (ref 4–15)
NEUTROPHILS # BLD AUTO: 3.5 K/UL (ref 1.8–7.7)
NEUTROPHILS NFR BLD: 62.1 % (ref 38–73)
NONHDLC SERPL-MCNC: 115 MG/DL
NRBC BLD-RTO: 0 /100 WBC
PLATELET # BLD AUTO: 232 K/UL (ref 150–450)
PMV BLD AUTO: 11.7 FL (ref 9.2–12.9)
POTASSIUM SERPL-SCNC: 4.5 MMOL/L (ref 3.5–5.1)
PROT SERPL-MCNC: 7.2 G/DL (ref 6–8.4)
RBC # BLD AUTO: 5.05 M/UL (ref 4.6–6.2)
SODIUM SERPL-SCNC: 139 MMOL/L (ref 136–145)
TRIGL SERPL-MCNC: 88 MG/DL (ref 30–150)
TSH SERPL DL<=0.005 MIU/L-ACNC: 1.09 UIU/ML (ref 0.4–4)
WBC # BLD AUTO: 5.6 K/UL (ref 3.9–12.7)

## 2025-02-15 PROCEDURE — 36415 COLL VENOUS BLD VENIPUNCTURE: CPT | Mod: PO | Performed by: INTERNAL MEDICINE

## 2025-02-15 PROCEDURE — 84153 ASSAY OF PSA TOTAL: CPT | Performed by: INTERNAL MEDICINE

## 2025-02-15 PROCEDURE — 85025 COMPLETE CBC W/AUTO DIFF WBC: CPT | Performed by: INTERNAL MEDICINE

## 2025-02-15 PROCEDURE — 84443 ASSAY THYROID STIM HORMONE: CPT | Performed by: INTERNAL MEDICINE

## 2025-02-15 PROCEDURE — 80061 LIPID PANEL: CPT | Performed by: INTERNAL MEDICINE

## 2025-02-15 PROCEDURE — 80053 COMPREHEN METABOLIC PANEL: CPT | Performed by: INTERNAL MEDICINE

## 2025-02-19 ENCOUNTER — OFFICE VISIT (OUTPATIENT)
Dept: FAMILY MEDICINE | Facility: CLINIC | Age: 70
End: 2025-02-19
Payer: MEDICARE

## 2025-02-19 VITALS
WEIGHT: 166.88 LBS | HEIGHT: 74 IN | TEMPERATURE: 98 F | BODY MASS INDEX: 21.42 KG/M2 | OXYGEN SATURATION: 97 % | HEART RATE: 69 BPM | DIASTOLIC BLOOD PRESSURE: 66 MMHG | SYSTOLIC BLOOD PRESSURE: 134 MMHG

## 2025-02-19 DIAGNOSIS — Z12.12 SCREENING FOR COLORECTAL CANCER: ICD-10-CM

## 2025-02-19 DIAGNOSIS — Z00.00 ROUTINE MEDICAL EXAM: ICD-10-CM

## 2025-02-19 DIAGNOSIS — R73.9 HYPERGLYCEMIA: ICD-10-CM

## 2025-02-19 DIAGNOSIS — Z12.11 SCREENING FOR COLORECTAL CANCER: ICD-10-CM

## 2025-02-19 DIAGNOSIS — Z12.5 SCREENING FOR PROSTATE CANCER: ICD-10-CM

## 2025-02-19 DIAGNOSIS — Z86.0100 HISTORY OF COLONIC POLYPS: ICD-10-CM

## 2025-02-19 DIAGNOSIS — F43.9 SITUATIONAL STRESS: ICD-10-CM

## 2025-02-19 DIAGNOSIS — I70.0 AORTIC ATHEROSCLEROSIS: ICD-10-CM

## 2025-02-19 DIAGNOSIS — Z95.0 PRESENCE OF CARDIAC RESYNCHRONIZATION THERAPY PACEMAKER (CRT-P): ICD-10-CM

## 2025-02-19 DIAGNOSIS — K21.9 GASTROESOPHAGEAL REFLUX DISEASE, UNSPECIFIED WHETHER ESOPHAGITIS PRESENT: ICD-10-CM

## 2025-02-19 DIAGNOSIS — I44.2 CHB (COMPLETE HEART BLOCK): ICD-10-CM

## 2025-02-19 DIAGNOSIS — E78.5 HYPERLIPIDEMIA, UNSPECIFIED HYPERLIPIDEMIA TYPE: Primary | ICD-10-CM

## 2025-02-19 DIAGNOSIS — Z87.19 HISTORY OF DIVERTICULITIS: ICD-10-CM

## 2025-02-19 PROCEDURE — 99213 OFFICE O/P EST LOW 20 MIN: CPT | Mod: PBBFAC,PO | Performed by: INTERNAL MEDICINE

## 2025-02-19 RX ORDER — LORAZEPAM 0.5 MG/1
0.5 TABLET ORAL EVERY 6 HOURS PRN
Qty: 30 TABLET | Refills: 3 | Status: SHIPPED | OUTPATIENT
Start: 2025-02-19 | End: 2025-03-21

## 2025-02-19 NOTE — PROGRESS NOTES
Chief complaint: Physical exam,     69-year-old white male.  From and health maintenance standpoint we reviewed his  labs.  His colonoscopy was normal in 2013 with a 10 year follow-up since previous polyps were hyperplastic. 2 polyps 1/24- 1 adenoma- 5-7 yrs. Labs reviewed and unremarkable x LDL .  He is feeling good.    Reviewed colon polyp pathology report    LDL is up in past but great 2025.  -? Off med just to see and now back on it. No problem reassured about statin therapy is okay to continue.    On prior exam he does have some general decreased breath sounds on the right compared to the left.  Last chest x-ray year ago did show some mild fibrotic changes at the right base.  Chest x-ray about 10 years prior did not have such issues mentioned.    Patient does report he thinks he is claustrophobic.  He can not sit in the back seat of a car about a lot of anxiety, elevators are okay if the ride is short and not crowded.  He is concerned about riding on plain or a bus in his wife does want to start traveling.  1 time he had to do some form of cardiac scan was given something which we presume was probably something like Valium it did work well but started to wear off.  We did discuss looking up cognitive behavioral therapies that he can do on his own and if severe we always could refer to psychology.  Will also use some Ativan and had a long discussion regarding dose adjustments based upon with situation, based upon symptoms and response and so forth.  I encouraged him to try it and start putting himself in those particular situations that he no should precipitate claustrophobia and anxiety. Ativan works well prn, dentist, airplane etc.  Reassured him about its use and if he tries one pill before getting on the plane and has significant anxiety on the plane he can take another.    He does report his penis is curving up but no remembered injury and not causing any pain with the erection so it is not in any acute  phase that would warrant treatment at this time and it is not preventing him from sexual intercourse so we discussed that if it becomes a problem I would refer him to a specific urologist.  Probably Peyronie's disease.     Over 70 min  minutes of total time spent on the encounter, which includes face to face time and non-face to face time preparing to see the patient (eg, review of tests), Obtaining and/or reviewing separately obtained history, Documenting clinical information in the electronic or other health record, Independently interpreting results (not separately reported) and communicating results to the patient/family/caregiver, or Care coordination (not separately reported).    ROS:   CONST: weight stable. EYES: no vision change. ENT: no sore throat. CV: no chest pain w/ exertion. RESP: no shortness of breath. GI: no nausea, vomiting, diarrhea. No dysphagia. : no urinary issues. MUSCULOSKELETAL: no new myalgias or arthralgias. SKIN: no new changes. NEURO: no focal deficits. PSYCH: no new issues. ENDOCRINE: no polyuria. HEME: no lymph nodes. ALLERGY: no general pruritis.          Past Medical History:   Diagnosis Date    2:1 Second degree AV block 03/05/2013    Cardiac pacemaker 03/06/2013    [facility]    Heart block 03/06/2013    S/p DC PM    Hyperlipidemia     Personal history of colonic polyps 12/30/2013    Normal 12/13 ---Repeat colonoscopy in 10 years for screening purposes. (Previous polyps were both hyperplastic)   2 polyps 1/24. 1 adenoma- 5-7 yrs   Diverticulitis  Occasional GERD          Past Surgical History:   Procedure Laterality Date    COLONOSCOPY N/A 1/22/2024    Procedure: COLONOSCOPY;  Surgeon: Israel Hartley MD;  Location: [address]);  Service: Endoscopy;  Laterality: N/A;  Referred by Dr. Gonzalez, pt requesting Dr. Hartley, [facility] Pacemaker, PEG, portal -ml  1/16- precall complete - ERW    EXCISIONAL HEMORRHOIDECTOMY N/A 6/7/2022    Procedure: HEMORRHOIDECTOMY;   Surgeon: Israel Hartley MD;  Location: [facility] OR [address] [place] [address];  Service: Colon and Rectal;  Laterality: N/A;    HERNIA REPAIR      IMPLANTATION OF BIVENTRICULAR PERMANENT PACEMAKER AS UPGRADE TO EXISTING PACEMAKER N/A 10/14/2019    Procedure: Biventricular pacemaker upgrade;  Surgeon: Jarod Hanson MD;  Location: [facility] EP LAB;  Service: Cardiology;  Laterality: N/A;  NICM, CRT-P upgrade, SJM, MAC, MB, 3 Prep *SJM PPM in situ*    inguinal hernia repair      x 2    INSERT / REPLACE / REMOVE PACEMAKER  03/06/2013    S/p DC PM/ replace w/Biventricular pacemaker upgrade 10/14/19    SHOULDER SURGERY Left        Social History     Social History    Marital status:      Spouse name: N/A    Number of children: 3 boys in college    Years of education: N/A     Occupational History    realator     Social History Main Topics    Smoking status: Former Smoker     Types: Cigars     Quit date: 7/23/1973    Smokeless tobacco: Never Used    Alcohol use 6.0 oz/week     5 Glasses of wine, 5 Cans of beer per week      Comment: Daily    Drug use: No    Sexual activity: Not on file     Other Topics Concern    Not on file     Social History Narrative       family history includes Cancer in his father and mother; Diabetes in his mother.      Over 70 min  minutes of total time spent on the encounter, which includes face to face time and non-face to face time preparing to see the patient (eg, review of tests), Obtaining and/or reviewing separately obtained history, Documenting clinical information in the electronic or other health record, Independently interpreting results (not separately reported) and communicating results to the patient/family/caregiver, or Care coordination (not separately reported).      Gen: no distress  EYES: conjunctiva clear, non-icteric, PERRL  ENT: nose clear, nasal mucosa normal, oropharynx clear and moist, teeth good  NECK:supple, thyroid non-palpable  RESP: effort is good, lungs general decreased breath  sounds on the right compared to the left.  No crackles or rhonchi  CV: heart RRR w/o murmur, gallops or rubs; no carotid bruits, no edema  GI: abdomen soft, non-distended, non-tender, no hepatosplenomegaly  MS: gait normal, no clubbing or cyanosis of the digits  SKIN: no rashes, warm to touch        Diagnoses and all orders for this visit:    Hyperlipidemia, unspecified hyperlipidemia type, chronic and stable back on statin    Routine medical exam, up-to-date on health maintenance and cancer screening    Screening for prostate cancer, normal PSA and will continue to check    Screening for colorectal cancer, repeat five years after last    History of colonic polyps, reviewed pathology, explained adenoma, I would prefer colonoscopy in five years    Aortic atherosclerosis, lipids very well controlled    CHB (complete heart block), chronic and stable    Gastroesophageal reflux disease, unspecified whether esophagitis present, chronic and stable    Presence of cardiac resynchronization therapy pacemaker (CRT-P), chronic and stable    History of diverticulitis, chronic and stable    Hyperglycemia, normal A1c    Situational stress  -     LORazepam (ATIVAN) 0.5 MG tablet; Take 1 tablet (0.5 mg total) by mouth every 6 (six) hours as needed for Anxiety.              Answers submitted by the patient for this visit:  Review of Systems Questionnaire (Submitted on 2/12/2025)  activity change: No  unexpected weight change: No  neck pain: No  hearing loss: No  rhinorrhea: No  trouble swallowing: No  eye discharge: No  visual disturbance: No  chest tightness: No  wheezing: No  chest pain: No  palpitations: No  blood in stool: No  constipation: No  vomiting: No  diarrhea: No  polydipsia: No  polyuria: No  difficulty urinating: No  urgency: No  hematuria: No  joint swelling: No  arthralgias: Yes  headaches: No  weakness: No  confusion: No  dysphoric mood: No

## 2025-02-19 NOTE — TELEPHONE ENCOUNTER
No care due was identified.  [employer] Embedded Care Due Messages. Reference number: [id number].   2/19/2025 3:35:30 PM CST

## 2025-02-21 NOTE — PROGRESS NOTES
Mr. Elaine is a patient of Dr. Hanson and was last seen in clinic 5/18/2023.      Subjective:   Patient ID:  Marcos Elaine is a 69 y.o. male who presents for follow up of CRT-P  .     HPI:    Mr. Elaine is a 69 y.o. male with CHD, CRT-P here for annual follow up.     Background:    67 yoM CHB s/p DC PM here for follow up after upgrade to CRT-P 10/14/19. He had 2:1 AVB s/p DC PM 3/6/64620. He progressed from intermittent RV pacing to 100% RV pacing over the past 2 years. He had change in EF from 55% to 45% with development of HF symptoms. PET stress was negative for ischemia. He went for CRT-P upgrade 10/14/19.     1/21/2020: Since upgrade he has had improvement in HF symptoms. Normal CRT-P parameters today. No sustained arrhythmias.     2/21: Stable symptoms. Normal CRT-P function    5/10/2022: Normal CRT-P function with no underlying. No sustained arrhythmias.   66 yoM CHB s/p CRT-P here for routine follow up. Normal CRT-P function with no sustained arrhythmias. I discussed routine device follow up including quarterly to bi-annual device checks for device function as well as yearly follow up in the EP clinic. The patient  was advised to call with any concerns regarding their device. Device clinic follow up as scheduled. RTC 1y    10/11/2022: ALERT via M for LV lead impedance > 3000 Ohms  Device check: LV lead impedances >3000 Ohms in any configuration using proximal electrode  Tested LV lead in multiple configurations, opted to use D1-M2: impedance 860 Ohms, capture threshold 1.75V @ 0.40ms.  EKG in this configuration shows QRSd of 164ms  Reprogramming at this visit: Changed LV configuration to D1-M2, adjusted LV Cap Pacing Margin to +0.5V, narrowed impedance monitoring to 1500 Ohms    5/18/2023: Mr. Elaine is doing well from a device perspective with stable lead and device function. LV lead config changed in October 2022 due to increased impedence. Parameters now remain stable. No arrhythmia noted. 100%  biventricular pacing. No CHF symptoms. Echo 2/2021 showed recovery of LV function. He is feeling well.    Update (02/26/2025):    Today he says he is feeling well overall. Mr. Elaine reports no chest pain with exertion or at rest, palpitations, SOB, PAUL, dizziness, or syncope.    Device Interrogation (2/26/2025) reveals an intrinsic SR with CHB with stable lead and device function. AMSx5, 4/5 events c/w RA lead oversensing. 1 AT/ST 1 min 28 seconds. No ventricular arrhythmias.  He paces 13% in the RA and >99% in the BiV. Estimated battery longevity 3 years.   Increased MTR to 120bm to allow for adequate V tracking.    I have personally reviewed the patient's EKG today, which shows ASBP at 62bpm. ID interval is 150. QRS is 172. QTc is 499.    Relevant Cardiac Test Results:    2D Echo (2/19/2021):  The left ventricle is normal in size with concentric remodeling and normal systolic function. The estimated ejection fraction is 58%.  Normal left ventricular diastolic function.  Normal right ventricular size with normal right ventricular systolic function.  Intermediate central venous pressure (8 mmHg).  The estimated PA systolic pressure is 30 mmHg.    Current Outpatient Medications   Medication Sig    CALCIUM POLYCARBOPHIL (KONSYL FIBER ORAL) Take by mouth.    glucosamine-chondroitin 500-400 mg tablet Take 1 tablet by mouth 3 (three) times daily.    LORazepam (ATIVAN) 0.5 MG tablet Take 1 tablet (0.5 mg total) by mouth every 6 (six) hours as needed for Anxiety.    multivitamin capsule Take 1 capsule by mouth once daily.    simvastatin (ZOCOR) 40 MG tablet Take 1 tablet by mouth once daily     No current facility-administered medications for this visit.     Facility-Administered Medications Ordered in Other Visits   Medication    0.9%  NaCl infusion    0.9%  NaCl infusion    mupirocin 2 % ointment       Review of Systems   Constitutional: Negative for malaise/fatigue.   Cardiovascular:  Negative for chest pain, dyspnea  "on exertion, irregular heartbeat, leg swelling and palpitations.   Respiratory:  Negative for shortness of breath.    Hematologic/Lymphatic: Negative for bleeding problem.   Skin:  Negative for rash.   Musculoskeletal:  Negative for myalgias.   Gastrointestinal:  Negative for hematemesis, hematochezia and nausea.   Genitourinary:  Negative for hematuria.   Neurological:  Negative for light-headedness.   Psychiatric/Behavioral:  Negative for altered mental status.    Allergic/Immunologic: Negative for persistent infections.       Objective:          /72 (Patient Position: Sitting)   Pulse 62   Ht 6' 2" (1.88 m)   Wt 75 kg (165 lb 5.5 oz)   BMI 21.23 kg/m²     Physical Exam  Vitals and nursing note reviewed.   Constitutional:       Appearance: Normal appearance. He is well-developed.   HENT:      Head: Normocephalic.      Nose: Nose normal.   Eyes:      Pupils: Pupils are equal, round, and reactive to light.   Cardiovascular:      Rate and Rhythm: Normal rate and regular rhythm.   Pulmonary:      Effort: No respiratory distress.   Chest:      Comments: Device to LUCW. Incision and pocket in good repair.    Musculoskeletal:         General: Normal range of motion.   Skin:     General: Skin is warm and dry.      Findings: No erythema.   Neurological:      Mental Status: He is alert and oriented to person, place, and time.   Psychiatric:         Speech: Speech normal.         Behavior: Behavior normal.           Lab Results   Component Value Date     02/15/2025    K 4.5 02/15/2025    MG 2.5 03/05/2013    BUN 11 02/15/2025    CREATININE 0.9 02/15/2025    ALT 25 02/15/2025    AST 48 (H) 02/15/2025    HGB 15.2 02/15/2025    HCT 46.9 02/15/2025    TSH 1.089 02/15/2025    LDLCALC 97.4 02/15/2025             Assessment:     1. Presence of cardiac resynchronization therapy pacemaker (CRT-P)    2. CHB (complete heart block)    3. Aortic atherosclerosis      Plan:     In summary, Mr. Elaine is a 69 y.o. male with " CHD, CRT-P here for annual follow up.   Mr. Elaine is doing well from a device perspective with stable lead and device function. No sustained arrhythmia noted. 100% biventricular pacing. ECG showing stable QRS. No CHF symptoms. Doing well. 3 yrs to [facility].    Continue current medication regimen and device settings.   Follow up in device clinic as scheduled.   Follow up in EP clinic in 1 year, sooner as needed.     *A copy of this note has been sent to Dr. Hanson*    Follow up in about 1 year (around 2/26/2026).    ------------------------------------------------------------------    SERENA Carbajal, NP-C  Cardiac Electrophysiology

## 2025-02-23 RX ORDER — LORAZEPAM 0.5 MG/1
0.5 TABLET ORAL EVERY 6 HOURS PRN
Qty: 30 TABLET | Refills: 3 | Status: SHIPPED | OUTPATIENT
Start: 2025-02-23

## 2025-02-26 ENCOUNTER — OFFICE VISIT (OUTPATIENT)
Dept: ELECTROPHYSIOLOGY | Facility: CLINIC | Age: 70
End: 2025-02-26
Payer: MEDICARE

## 2025-02-26 ENCOUNTER — HOSPITAL ENCOUNTER (OUTPATIENT)
Dept: CARDIOLOGY | Facility: CLINIC | Age: 70
Discharge: HOME | End: 2025-02-26
Attending: INTERNAL MEDICINE
Payer: MEDICARE

## 2025-02-26 ENCOUNTER — CLINICAL SUPPORT (OUTPATIENT)
Dept: CARDIOLOGY | Facility: HOSPITAL | Age: 70
End: 2025-02-26
Attending: INTERNAL MEDICINE
Payer: MEDICARE

## 2025-02-26 VITALS
BODY MASS INDEX: 21.23 KG/M2 | HEART RATE: 62 BPM | SYSTOLIC BLOOD PRESSURE: 138 MMHG | HEIGHT: 74 IN | DIASTOLIC BLOOD PRESSURE: 72 MMHG | WEIGHT: 165.38 LBS

## 2025-02-26 DIAGNOSIS — I44.2 CHB (COMPLETE HEART BLOCK): ICD-10-CM

## 2025-02-26 DIAGNOSIS — Z95.0 PRESENCE OF CARDIAC RESYNCHRONIZATION THERAPY PACEMAKER (CRT-P): ICD-10-CM

## 2025-02-26 DIAGNOSIS — I70.0 AORTIC ATHEROSCLEROSIS: ICD-10-CM

## 2025-02-26 DIAGNOSIS — Z95.0 PRESENCE OF CARDIAC RESYNCHRONIZATION THERAPY PACEMAKER (CRT-P): Primary | ICD-10-CM

## 2025-02-26 LAB
OHS QRS DURATION: 172 MS
OHS QTC CALCULATION: 499 MS

## 2025-02-26 PROCEDURE — 93005 ELECTROCARDIOGRAM TRACING: CPT | Mod: PBBFAC | Performed by: INTERNAL MEDICINE

## 2025-02-26 PROCEDURE — 93010 ELECTROCARDIOGRAM REPORT: CPT | Mod: S$PBB,,, | Performed by: INTERNAL MEDICINE

## 2025-02-26 PROCEDURE — 99999 PR PBB SHADOW E&M-EST. PATIENT-LVL III: CPT | Mod: PBBFAC,,, | Performed by: NURSE PRACTITIONER

## 2025-02-26 PROCEDURE — 99214 OFFICE O/P EST MOD 30 MIN: CPT | Mod: S$PBB,,, | Performed by: NURSE PRACTITIONER

## 2025-02-26 PROCEDURE — 93281 PM DEVICE PROGR EVAL MULTI: CPT

## 2025-02-26 PROCEDURE — 99213 OFFICE O/P EST LOW 20 MIN: CPT | Mod: PBBFAC,25 | Performed by: NURSE PRACTITIONER

## 2025-03-20 ENCOUNTER — CLINICAL SUPPORT (OUTPATIENT)
Dept: CARDIOLOGY | Facility: HOSPITAL | Age: 70
End: 2025-03-20
Attending: INTERNAL MEDICINE
Payer: MEDICARE

## 2025-03-20 ENCOUNTER — TELEPHONE (OUTPATIENT)
Dept: ELECTROPHYSIOLOGY | Facility: CLINIC | Age: 70
End: 2025-03-20
Payer: MEDICARE

## 2025-03-20 ENCOUNTER — PATIENT MESSAGE (OUTPATIENT)
Dept: ELECTROPHYSIOLOGY | Facility: CLINIC | Age: 70
End: 2025-03-20

## 2025-03-20 DIAGNOSIS — I49.9 CARDIAC ARRHYTHMIA, UNSPECIFIED CARDIAC ARRHYTHMIA TYPE: ICD-10-CM

## 2025-03-20 DIAGNOSIS — I44.2 CHB (COMPLETE HEART BLOCK): Primary | ICD-10-CM

## 2025-03-20 DIAGNOSIS — Z95.0 PRESENCE OF CARDIAC RESYNCHRONIZATION THERAPY PACEMAKER (CRT-P): ICD-10-CM

## 2025-03-20 DIAGNOSIS — T82.110A AICD LEAD MALFUNCTION: ICD-10-CM

## 2025-03-20 DIAGNOSIS — I44.2 CHB (COMPLETE HEART BLOCK): ICD-10-CM

## 2025-03-20 PROCEDURE — 93284 PRGRMG EVAL IMPLANTABLE DFB: CPT | Mod: 26,,, | Performed by: INTERNAL MEDICINE

## 2025-03-20 PROCEDURE — 93284 PRGRMG EVAL IMPLANTABLE DFB: CPT

## 2025-03-20 NOTE — TELEPHONE ENCOUNTER
Remote transmission received for elevated LV impedance of 2550 ohms today.  LV impedance was 1075 ohms on 2/26/2025.    LV autocapture unknown today.  Will bring patient in for device check to assess LV lead.    LVM  to schedule a device check to evaluate LV lead            10/10/2022 LV impedance was > 3000 ohms.  In clinic device check done and LV config changed to D1-M2,; all using proximal electrode (P4) showed impedance > 3000 ohms.

## 2025-03-22 LAB
OHS CV AF BURDEN PERCENT: < 1
OHS CV BIV PACING PERCENT: 99
OHS CV DC REMOTE DEVICE TYPE: NORMAL

## 2025-03-23 ENCOUNTER — CLINICAL SUPPORT (OUTPATIENT)
Dept: CARDIOLOGY | Facility: HOSPITAL | Age: 70
End: 2025-03-23
Attending: INTERNAL MEDICINE
Payer: MEDICARE

## 2025-03-23 ENCOUNTER — CLINICAL SUPPORT (OUTPATIENT)
Dept: CARDIOLOGY | Facility: HOSPITAL | Age: 70
End: 2025-03-23
Payer: MEDICARE

## 2025-03-23 DIAGNOSIS — Z95.0 PRESENCE OF CARDIAC PACEMAKER: ICD-10-CM

## 2025-03-23 DIAGNOSIS — I44.2 ATRIOVENTRICULAR BLOCK, COMPLETE: ICD-10-CM

## 2025-03-23 PROCEDURE — 93294 REM INTERROG EVL PM/LDLS PM: CPT | Mod: ,,, | Performed by: INTERNAL MEDICINE

## 2025-03-24 ENCOUNTER — TELEPHONE (OUTPATIENT)
Dept: ELECTROPHYSIOLOGY | Facility: CLINIC | Age: 70
End: 2025-03-24
Payer: MEDICARE

## 2025-03-24 ENCOUNTER — ANESTHESIA EVENT (OUTPATIENT)
Dept: MEDSURG UNIT | Facility: HOSPITAL | Age: 70
End: 2025-03-24
Payer: MEDICARE

## 2025-03-24 NOTE — TELEPHONE ENCOUNTER
Spoke to patient    CONFIRMED procedure arrival time of 8:00 AM on 3/25/2025    Reiterated instructions including:  -Directions to check in desk  -NPO after midnight night prior to procedure  -Patient allowed to drink water up until 2 hours prior to arrival due to IV fluid shortage.   -Confirmed compliance of prescribed medication   -Pre-procedure LABS Reviewed  -Confirmed presence of implanted device/stimulator CRT-P SJM  -Confirmed no fever, cough, or shortness of breath in the past 30 days  -Confirmed no redness, rash, irritation, or yeast infection to chest area.   -Bathe night prior and morning prior to procedure with Hibiclens solution or an antibacterial soap  -Reviewed current visitor policy    Patient verbalized understanding of above and appreciated the call.

## 2025-03-24 NOTE — ANESTHESIA PREPROCEDURE EVALUATION
03/24/2025  Marcos Elaine is a 69 y.o., male.Problem List[1]        Pre-op Assessment    I have reviewed the Patient Summary Reports.       I have reviewed the Medications.     Review of Systems  Anesthesia Hx:  No problems with previous Anesthesia               Denies Personal Hx of Anesthesia complications.                    Cardiovascular:         Dysrhythmias                                  Disorder of Cardiac Rhythm     Hepatic/GI:     GERD         Gerd              Physical Exam    Airway:  No airway management difficulties anticipated  Dental:  No active dental issues noted  Chest/Lungs:  Clear to auscultation    Heart:  Rate: Normal  Rhythm: Regular Rhythm  Sounds: Normal        Anesthesia Plan  Type of Anesthesia, risks & benefits discussed:    Anesthesia Type: Gen Natural Airway, Gen Supraglottic Airway  Informed Consent: Informed consent signed with the Patient and all parties understand the risks and agree with anesthesia plan.  All questions answered.   ASA Score: 3  Anesthesia Plan Notes: Chart reviewed. Patient seen and examined. Anesthesia plan discussed and questions answered. E-consent signed. Merrill Logan MD    Ready For Surgery From Anesthesia Perspective.     .           [1]   Patient Active Problem List  Diagnosis    CHB (complete heart block)    Hyperlipidemia    Presence of cardiac resynchronization therapy pacemaker (CRT-P)    Special screening for malignant neoplasms, colon    Personal history of colonic polyps    History of diverticulitis    Gastroesophageal reflux disease    History of cardiomyopathy - recovered after CRT     Aortic atherosclerosis

## 2025-03-25 ENCOUNTER — HOSPITAL ENCOUNTER (INPATIENT)
Facility: HOSPITAL | Age: 70
LOS: 4 days | Discharge: HOME | DRG: 243 | End: 2025-03-29
Attending: INTERNAL MEDICINE | Admitting: INTERNAL MEDICINE
Payer: MEDICARE

## 2025-03-25 ENCOUNTER — ANESTHESIA (OUTPATIENT)
Dept: MEDSURG UNIT | Facility: HOSPITAL | Age: 70
End: 2025-03-25
Payer: MEDICARE

## 2025-03-25 DIAGNOSIS — I49.9 ARRHYTHMIA: Primary | ICD-10-CM

## 2025-03-25 DIAGNOSIS — I44.2 CHB (COMPLETE HEART BLOCK): ICD-10-CM

## 2025-03-25 DIAGNOSIS — Z95.9 CARDIAC DEVICE IN SITU: ICD-10-CM

## 2025-03-25 DIAGNOSIS — T82.110A AICD LEAD MALFUNCTION: ICD-10-CM

## 2025-03-25 PROBLEM — J95.811 PNEUMOTHORAX, POSTPROCEDURAL: Status: ACTIVE | Noted: 2025-03-25

## 2025-03-25 LAB
OHS QRS DURATION: 160 MS
OHS QRS DURATION: 176 MS
OHS QTC CALCULATION: 507 MS
OHS QTC CALCULATION: 513 MS

## 2025-03-25 PROCEDURE — C1898 LEAD, PMKR, OTHER THAN TRANS: HCPCS | Performed by: INTERNAL MEDICINE

## 2025-03-25 PROCEDURE — C1893 INTRO/SHEATH, FIXED,NON-PEEL: HCPCS | Performed by: INTERNAL MEDICINE

## 2025-03-25 PROCEDURE — 27201423 OPTIME MED/SURG SUP & DEVICES STERILE SUPPLY: Performed by: INTERNAL MEDICINE

## 2025-03-25 PROCEDURE — 25000003 PHARM REV CODE 250: Performed by: NURSE ANESTHETIST, CERTIFIED REGISTERED

## 2025-03-25 PROCEDURE — 0JPT0PZ REMOVAL OF CARDIAC RHYTHM RELATED DEVICE FROM TRUNK SUBCUTANEOUS TISSUE AND FASCIA, OPEN APPROACH: ICD-10-PCS | Performed by: INTERNAL MEDICINE

## 2025-03-25 PROCEDURE — 63600175 PHARM REV CODE 636 W HCPCS: Performed by: NURSE ANESTHETIST, CERTIFIED REGISTERED

## 2025-03-25 PROCEDURE — 27000221 HC OXYGEN, UP TO 24 HOURS

## 2025-03-25 PROCEDURE — 93005 ELECTROCARDIOGRAM TRACING: CPT

## 2025-03-25 PROCEDURE — 32551 INSERTION OF CHEST TUBE: CPT

## 2025-03-25 PROCEDURE — 25500020 PHARM REV CODE 255: Performed by: NURSE ANESTHETIST, CERTIFIED REGISTERED

## 2025-03-25 PROCEDURE — 0JH607Z INSERTION OF CARDIAC RESYNCHRONIZATION PACEMAKER PULSE GENERATOR INTO CHEST SUBCUTANEOUS TISSUE AND FASCIA, OPEN APPROACH: ICD-10-PCS | Performed by: INTERNAL MEDICINE

## 2025-03-25 PROCEDURE — 33233 REMOVAL OF PM GENERATOR: CPT | Mod: 51,,, | Performed by: INTERNAL MEDICINE

## 2025-03-25 PROCEDURE — 37000008 HC ANESTHESIA 1ST 15 MINUTES: Performed by: INTERNAL MEDICINE

## 2025-03-25 PROCEDURE — 33233 REMOVAL OF PM GENERATOR: CPT | Performed by: INTERNAL MEDICINE

## 2025-03-25 PROCEDURE — 25000003 PHARM REV CODE 250: Performed by: STUDENT IN AN ORGANIZED HEALTH CARE EDUCATION/TRAINING PROGRAM

## 2025-03-25 PROCEDURE — 20000000 HC ICU ROOM

## 2025-03-25 PROCEDURE — 27800903 OPTIME MED/SURG SUP & DEVICES OTHER IMPLANTS: Performed by: INTERNAL MEDICINE

## 2025-03-25 PROCEDURE — 37000009 HC ANESTHESIA EA ADD 15 MINS: Performed by: INTERNAL MEDICINE

## 2025-03-25 PROCEDURE — 0JH637Z INSERTION OF CARDIAC RESYNCHRONIZATION PACEMAKER PULSE GENERATOR INTO CHEST SUBCUTANEOUS TISSUE AND FASCIA, PERCUTANEOUS APPROACH: ICD-10-PCS | Performed by: INTERNAL MEDICINE

## 2025-03-25 PROCEDURE — 33207 INSERT HEART PM VENTRICULAR: CPT | Mod: ,,, | Performed by: INTERNAL MEDICINE

## 2025-03-25 PROCEDURE — 93010 ELECTROCARDIOGRAM REPORT: CPT | Mod: ,,, | Performed by: INTERNAL MEDICINE

## 2025-03-25 PROCEDURE — 25000003 PHARM REV CODE 250

## 2025-03-25 PROCEDURE — 02HK3JZ INSERTION OF PACEMAKER LEAD INTO RIGHT VENTRICLE, PERCUTANEOUS APPROACH: ICD-10-PCS | Performed by: INTERNAL MEDICINE

## 2025-03-25 PROCEDURE — 99900035 HC TECH TIME PER 15 MIN (STAT)

## 2025-03-25 PROCEDURE — 25500020 PHARM REV CODE 255: Performed by: STUDENT IN AN ORGANIZED HEALTH CARE EDUCATION/TRAINING PROGRAM

## 2025-03-25 PROCEDURE — C1769 GUIDE WIRE: HCPCS | Performed by: INTERNAL MEDICINE

## 2025-03-25 PROCEDURE — C1725 CATH, TRANSLUMIN NON-LASER: HCPCS | Performed by: INTERNAL MEDICINE

## 2025-03-25 PROCEDURE — C1894 INTRO/SHEATH, NON-LASER: HCPCS | Performed by: INTERNAL MEDICINE

## 2025-03-25 PROCEDURE — 25000003 PHARM REV CODE 250: Performed by: INTERNAL MEDICINE

## 2025-03-25 PROCEDURE — C2621 PMKR, OTHER THAN SING/DUAL: HCPCS | Performed by: INTERNAL MEDICINE

## 2025-03-25 PROCEDURE — 94761 N-INVAS EAR/PLS OXIMETRY MLT: CPT

## 2025-03-25 PROCEDURE — 33207 INSERT HEART PM VENTRICULAR: CPT | Performed by: INTERNAL MEDICINE

## 2025-03-25 PROCEDURE — 02HL3JZ INSERTION OF PACEMAKER LEAD INTO LEFT VENTRICLE, PERCUTANEOUS APPROACH: ICD-10-PCS | Performed by: INTERNAL MEDICINE

## 2025-03-25 PROCEDURE — 63600175 PHARM REV CODE 636 W HCPCS: Performed by: INTERNAL MEDICINE

## 2025-03-25 PROCEDURE — 0W9B00Z DRAINAGE OF LEFT PLEURAL CAVITY WITH DRAINAGE DEVICE, OPEN APPROACH: ICD-10-PCS | Performed by: INTERNAL MEDICINE

## 2025-03-25 DEVICE — LEAD CAP (IS-1/DF-1/SJ4/DF4/IS4)
Type: IMPLANTABLE DEVICE | Site: HEART | Status: FUNCTIONAL
Brand: SJM™

## 2025-03-25 DEVICE — RF CRT CARDIAC RESYNCHRONIZATION THERAPY PACEMAKER DDDRV
Type: IMPLANTABLE DEVICE | Site: CHEST | Status: FUNCTIONAL
Brand: ALLURE™

## 2025-03-25 DEVICE — PACING LEAD
Type: IMPLANTABLE DEVICE | Site: HEART | Status: FUNCTIONAL
Brand: ULTIPACE™

## 2025-03-25 RX ORDER — VANCOMYCIN HYDROCHLORIDE 1 G/20ML
INJECTION, POWDER, LYOPHILIZED, FOR SOLUTION INTRAVENOUS
Status: DISCONTINUED | OUTPATIENT
Start: 2025-03-25 | End: 2025-03-25 | Stop reason: HOSPADM

## 2025-03-25 RX ORDER — MIDAZOLAM HYDROCHLORIDE 1 MG/ML
INJECTION INTRAMUSCULAR; INTRAVENOUS
Status: DISCONTINUED | OUTPATIENT
Start: 2025-03-25 | End: 2025-03-25

## 2025-03-25 RX ORDER — DOXYCYCLINE 100 MG/1
100 CAPSULE ORAL EVERY 12 HOURS
Qty: 10 CAPSULE | Refills: 0 | Status: SHIPPED | OUTPATIENT
Start: 2025-03-25 | End: 2025-03-28

## 2025-03-25 RX ORDER — PROPOFOL 10 MG/ML
VIAL (ML) INTRAVENOUS
Status: DISCONTINUED | OUTPATIENT
Start: 2025-03-25 | End: 2025-03-25

## 2025-03-25 RX ORDER — CYCLOBENZAPRINE HCL 5 MG
5 TABLET ORAL 3 TIMES DAILY PRN
Status: ACTIVE | OUTPATIENT
Start: 2025-03-25 | End: 2025-03-27

## 2025-03-25 RX ORDER — CEFAZOLIN 2 G/1
2 INJECTION, POWDER, FOR SOLUTION INTRAMUSCULAR; INTRAVENOUS
Status: DISCONTINUED | OUTPATIENT
Start: 2025-03-25 | End: 2025-03-25 | Stop reason: HOSPADM

## 2025-03-25 RX ORDER — FENTANYL CITRATE 50 UG/ML
25 INJECTION, SOLUTION INTRAMUSCULAR; INTRAVENOUS EVERY 5 MIN PRN
Status: DISCONTINUED | OUTPATIENT
Start: 2025-03-25 | End: 2025-03-25 | Stop reason: HOSPADM

## 2025-03-25 RX ORDER — ONDANSETRON HYDROCHLORIDE 2 MG/ML
4 INJECTION, SOLUTION INTRAVENOUS ONCE AS NEEDED
Status: DISCONTINUED | OUTPATIENT
Start: 2025-03-25 | End: 2025-03-25 | Stop reason: HOSPADM

## 2025-03-25 RX ORDER — SODIUM CHLORIDE 0.9 G/100ML
INJECTION, SOLUTION IRRIGATION
Status: DISCONTINUED | OUTPATIENT
Start: 2025-03-25 | End: 2025-03-25 | Stop reason: HOSPADM

## 2025-03-25 RX ORDER — ACETAMINOPHEN 325 MG/1
650 TABLET ORAL EVERY 4 HOURS PRN
Status: DISCONTINUED | OUTPATIENT
Start: 2025-03-25 | End: 2025-03-29 | Stop reason: HOSPADM

## 2025-03-25 RX ORDER — DOXYCYCLINE HYCLATE 100 MG
100 TABLET ORAL EVERY 12 HOURS
Status: DISCONTINUED | OUTPATIENT
Start: 2025-03-25 | End: 2025-03-29 | Stop reason: HOSPADM

## 2025-03-25 RX ORDER — BUPIVACAINE HYDROCHLORIDE 2.5 MG/ML
INJECTION, SOLUTION EPIDURAL; INFILTRATION; INTRACAUDAL; PERINEURAL
Status: DISCONTINUED | OUTPATIENT
Start: 2025-03-25 | End: 2025-03-25 | Stop reason: HOSPADM

## 2025-03-25 RX ORDER — HYDROMORPHONE HYDROCHLORIDE 1 MG/ML
0.2 INJECTION, SOLUTION INTRAMUSCULAR; INTRAVENOUS; SUBCUTANEOUS EVERY 5 MIN PRN
Status: DISCONTINUED | OUTPATIENT
Start: 2025-03-25 | End: 2025-03-25 | Stop reason: HOSPADM

## 2025-03-25 RX ORDER — KETAMINE HCL IN 0.9 % NACL 50 MG/5 ML
SYRINGE (ML) INTRAVENOUS
Status: DISCONTINUED | OUTPATIENT
Start: 2025-03-25 | End: 2025-03-25

## 2025-03-25 RX ORDER — IODIXANOL 320 MG/ML
INJECTION, SOLUTION INTRAVASCULAR
Status: DISCONTINUED | OUTPATIENT
Start: 2025-03-25 | End: 2025-03-25

## 2025-03-25 RX ORDER — IODIXANOL 320 MG/ML
INJECTION, SOLUTION INTRAVASCULAR
Status: DISCONTINUED | OUTPATIENT
Start: 2025-03-25 | End: 2025-03-25 | Stop reason: HOSPADM

## 2025-03-25 RX ORDER — CEFAZOLIN SODIUM 1 G/3ML
INJECTION, POWDER, FOR SOLUTION INTRAMUSCULAR; INTRAVENOUS
Status: DISCONTINUED | OUTPATIENT
Start: 2025-03-25 | End: 2025-03-25

## 2025-03-25 RX ORDER — LIDOCAINE HYDROCHLORIDE 10 MG/ML
INJECTION, SOLUTION EPIDURAL; INFILTRATION; INTRACAUDAL; PERINEURAL
Status: DISCONTINUED
Start: 2025-03-25 | End: 2025-03-25 | Stop reason: WASHOUT

## 2025-03-25 RX ORDER — LIDOCAINE HYDROCHLORIDE 20 MG/ML
INJECTION, SOLUTION EPIDURAL; INFILTRATION; INTRACAUDAL; PERINEURAL
Status: DISCONTINUED | OUTPATIENT
Start: 2025-03-25 | End: 2025-03-25 | Stop reason: HOSPADM

## 2025-03-25 RX ORDER — PROCHLORPERAZINE EDISYLATE 5 MG/ML
5 INJECTION INTRAMUSCULAR; INTRAVENOUS EVERY 30 MIN PRN
Status: DISCONTINUED | OUTPATIENT
Start: 2025-03-25 | End: 2025-03-25 | Stop reason: HOSPADM

## 2025-03-25 RX ORDER — DIPHENHYDRAMINE HYDROCHLORIDE 50 MG/ML
25 INJECTION, SOLUTION INTRAMUSCULAR; INTRAVENOUS EVERY 6 HOURS PRN
Status: DISCONTINUED | OUTPATIENT
Start: 2025-03-25 | End: 2025-03-25 | Stop reason: HOSPADM

## 2025-03-25 RX ORDER — OXYCODONE AND ACETAMINOPHEN 5; 325 MG/1; MG/1
1 TABLET ORAL EVERY 6 HOURS PRN
Refills: 0 | Status: DISCONTINUED | OUTPATIENT
Start: 2025-03-25 | End: 2025-03-29 | Stop reason: HOSPADM

## 2025-03-25 RX ORDER — PROPOFOL 10 MG/ML
VIAL (ML) INTRAVENOUS CONTINUOUS PRN
Status: DISCONTINUED | OUTPATIENT
Start: 2025-03-25 | End: 2025-03-25

## 2025-03-25 RX ORDER — LIDOCAINE HYDROCHLORIDE 20 MG/ML
INJECTION INTRAVENOUS
Status: DISCONTINUED | OUTPATIENT
Start: 2025-03-25 | End: 2025-03-25

## 2025-03-25 RX ADMIN — SODIUM CHLORIDE: 9 INJECTION, SOLUTION INTRAVENOUS at 09:03

## 2025-03-25 RX ADMIN — LIDOCAINE HYDROCHLORIDE 80 MG: 20 INJECTION INTRAVENOUS at 09:03

## 2025-03-25 RX ADMIN — CEFAZOLIN 2 G: 330 INJECTION, POWDER, FOR SOLUTION INTRAMUSCULAR; INTRAVENOUS at 10:03

## 2025-03-25 RX ADMIN — DOXYCYCLINE HYCLATE 100 MG: 100 TABLET, COATED ORAL at 08:03

## 2025-03-25 RX ADMIN — IODIXANOL 10 ML: 320 INJECTION, SOLUTION INTRAVASCULAR at 10:03

## 2025-03-25 RX ADMIN — PHENYLEPHRINE HYDROCHLORIDE 0.3 MCG/KG/MIN: 10 INJECTION INTRAVENOUS at 10:03

## 2025-03-25 RX ADMIN — ACETAMINOPHEN 650 MG: 325 TABLET ORAL at 05:03

## 2025-03-25 RX ADMIN — PROPOFOL 100 MCG/KG/MIN: 10 INJECTION, EMULSION INTRAVENOUS at 09:03

## 2025-03-25 RX ADMIN — Medication 20 MG: at 09:03

## 2025-03-25 RX ADMIN — OXYCODONE HYDROCHLORIDE AND ACETAMINOPHEN 1 TABLET: 5; 325 TABLET ORAL at 08:03

## 2025-03-25 RX ADMIN — PROPOFOL 40 MG: 10 INJECTION, EMULSION INTRAVENOUS at 09:03

## 2025-03-25 RX ADMIN — MIDAZOLAM HYDROCHLORIDE 2 MG: 2 INJECTION, SOLUTION INTRAMUSCULAR; INTRAVENOUS at 09:03

## 2025-03-25 NOTE — HPI
"69 yoM CHB s/p DC PM with upgrade to CRT-P 10/14/19 ([facility]) with improvement in EF. He is here today for LV lead revision and reimplant. Procedure complicated by apical pneumothorax noted on the post-procedure CXR and pulmonary consulted for assistance with management of the new finding. Hx of smoking "years ago. He otherwise denies other lung problems.   "

## 2025-03-25 NOTE — PLAN OF CARE
Received pt to SSCU from home accompanied by spouse.  AAO x 4. Denies pain or discomfort. Respirations even and unlabored. No distress noted. Pt stable.  Admit assessment complete. IV x 2 placed.  Pt oriented to room and call bell placed within reach.     .

## 2025-03-25 NOTE — SUBJECTIVE & OBJECTIVE
Past Medical History:   Diagnosis Date    2:1 Second degree AV block 2013    Cardiac pacemaker 2013    [facility]    Heart block 2013    S/p DC PM    Hyperlipidemia     Personal history of colonic polyps 2013    Normal  ---Repeat colonoscopy in 10 years for screening purposes. (Previous polyps were both hyperplastic)       Past Surgical History:   Procedure Laterality Date    COLONOSCOPY N/A 2024    Procedure: COLONOSCOPY;  Surgeon: Israel Hartley MD;  Location: [facility] ENDO (4TH FLR);  Service: Endoscopy;  Laterality: N/A;  Referred by Dr. Gonzalez, pt requesting Dr. Hartley, [facility] Pacemaker, PEG, portal -ml  - precall complete - ERW    EXCISIONAL HEMORRHOIDECTOMY N/A 2022    Procedure: HEMORRHOIDECTOMY;  Surgeon: Israel Hartley MD;  Location: [facility] OR 2ND FLR;  Service: Colon and Rectal;  Laterality: N/A;    HERNIA REPAIR      IMPLANTATION OF BIVENTRICULAR PERMANENT PACEMAKER AS UPGRADE TO EXISTING PACEMAKER N/A 10/14/2019    Procedure: Biventricular pacemaker upgrade;  Surgeon: Jarod Hanson MD;  Location: [facility] EP LAB;  Service: Cardiology;  Laterality: N/A;  NICM, CRT-P upgrade, SJM, MAC, MB, 3 Prep *SJM PPM in situ*    inguinal hernia repair      x 2    INSERT / REPLACE / REMOVE PACEMAKER  2013    S/p DC PM/ replace w/Biventricular pacemaker upgrade 10/14/19    SHOULDER SURGERY Left        Review of patient's allergies indicates:  No Known Allergies    Family History       Problem Relation (Age of Onset)    Cancer Mother, Father    Diabetes Mother          Tobacco Use    Smoking status: Former     Types: Cigars     Quit date: 1973     Years since quittin.7     Passive exposure: Past    Smokeless tobacco: Never    Tobacco comments:     Patient Quit Smoking Cigars on 1973.   Substance and Sexual Activity    Alcohol use: Yes     Alcohol/week: 7.0 standard drinks of alcohol     Types: 5 Glasses of wine, 2 Cans of beer per week     " Comment: Daily 1-2    Drug use: No    Sexual activity: Yes     Partners: Female         Physical Exam  Vitals and nursing note reviewed.   Constitutional:       Appearance: Normal appearance.   HENT:      Head: Normocephalic.   Cardiovascular:      Rate and Rhythm: Normal rate and regular rhythm. Paced.      Pulses: Normal pulses.      Heart sounds: Normal heart sounds.   Pulmonary:      Effort: Pulmonary effort is normal. No respiratory distress     Breath sounds: Normal breath sounds.   Abdominal:      General: Abdomen is flat.      Palpations: Abdomen is soft.   Musculoskeletal:      Left arm splinted s/p EP procedure.      Right lower leg: No edema.      Left lower leg: No edema.   Skin:     General: Skin is warm.   Neurological:      General: No focal deficit present.      Mental Status: He is alert.   Objective:     Vital Signs (Most Recent):  Temp: 97.2 °F (36.2 °C) (03/25/25 1230)  Pulse: 60 (03/25/25 1345)  Resp: 15 (03/25/25 1345)  BP: (!) 159/83 (03/25/25 1345)  SpO2: 97 % (03/25/25 1345) Vital Signs (24h Range):  Temp:  [97.2 °F (36.2 °C)-98.4 °F (36.9 °C)] 97.2 °F (36.2 °C)  Pulse:  [60-65] 60  Resp:  [14-18] 15  SpO2:  [96 %-100 %] 97 %  BP: (156-184)/(79-90) 159/83     Weight: 74.8 kg (165 lb)  Body mass index is 21.18 kg/m².      Intake/Output Summary (Last 24 hours) at 3/25/2025 1408  Last data filed at 3/25/2025 1204  Gross per 24 hour   Intake 800 ml   Output --   Net 800 ml        Physical Exam     Vents:       Lines/Drains/Airways       Peripheral Intravenous Line  Duration                  Peripheral IV - Single Lumen 03/25/25 0814 20 G Left;Posterior Hand <1 day         Peripheral IV - Single Lumen 03/25/25 0827 20 G Anterior;Right Forearm <1 day                    Significant Labs:    CBC/Anemia Profile:  No results for input(s): "WBC", "HGB", "HCT", "PLT", "MCV", "RDW", "IRON", "FERRITIN", "RETIC", "FOLATE", "[id number]", "OCCULTBLOOD" in the last 48 hours.     Chemistries:  No results for " "input(s): "NA", "K", "CL", "CO2", "BUN", "CREATININE", "CALCIUM", "ALBUMIN", "PROT", "BILITOT", "ALKPHOS", "ALT", "AST", "GLUCOSE", "MG", "PHOS" in the last 48 hours.        Significant Imaging:   I have reviewed all pertinent imaging results/findings within the past 24 hours.  CXR: I have reviewed all pertinent results/findings within the past 24 hours and my personal findings are:  Pneumothorax  "

## 2025-03-25 NOTE — PROCEDURES
"Marcos Elaine is a 69 y.o. male patient.    Temp: 97.2 °F (36.2 °C) (25 1230)  Pulse: 74 (25 161)  Resp: 20 (25 161)  BP: (!) 154/76 (25 161)  SpO2: 95 % (25 161)  Weight: 74.8 kg (165 lb) (25 08)  Height: 6' 2" (188 cm) (25 08)       Small Bore Chest Tube Insertion    Date/Time: 3/25/2025 4:57 PM  Location procedure was performed: [facility] PULMONARY MEDICINE    Performed by: Arden Avelar MD  Authorized by: Dayton Abraham MD  Assisting provider: Rajeev Payton MD  Post-operative diagnosis: Tension Traumatic Pneumothorax  Pre-operative diagnosis: Tension Traumatic Pneumothorax  Consent Done: Yes  Consent: Written consent obtained  Risks and benefits: risks, benefits and alternatives were discussed  Consent given by: patient  Patient understanding: patient states understanding of the procedure being performed  Patient consent: the patient's understanding of the procedure matches consent given  Procedure consent: procedure consent matches procedure scheduled  Relevant documents: relevant documents present and verified  Test results: test results available and properly labeled  Site marked: the operative site was marked  Imaging studies: imaging studies available  Required items: required blood products, implants, devices, and special equipment available  Patient identity confirmed: verbally with patient, name, , MRN and provided demographic data  Time out: Immediately prior to procedure a "time out" was called to verify the correct patient, procedure, equipment, support staff and site/side marked as required.  Indications: pneumothorax  Indications comments: Left  Anesthesia: local infiltration    Anesthesia:  Local Anesthetic: lidocaine 1% without epinephrine  Preparation: skin prepped with ChloraPrep  Placement location: left anterior  Tube connected to: suction  Suture material: 2-0 silk  Dressing: 4x4 sterile gauze  Post-insertion x-ray " findings: tube in good position  Patient tolerance: Patient tolerated the procedure well with no immediate complications  Complications: No  Estimated blood loss (mL): 0          3/25/2025

## 2025-03-25 NOTE — NURSING TRANSFER
Nursing Transfer Note      3/25/2025   3:02 PM    Nurse giving handoff: Michael ESQUEDA PACU  Nurse receiving handoff: Tiana ESQUEDA CICU    Reason patient is being transferred: post procedure/anesthesia    Transfer To: 3090    Transfer via stretcher    Transfer with cardiac monitoring    Transported by RN x1, PCT x1    Transfer Vital Signs:  Please see flow sheet    Telemetry:  pt is on transport monitor    Order for Tele Monitor? Yes    Additional Lines: 2L NC Oxygen and Chest Tube    Medicines sent: none    Any special needs or follow-up needed: routine    Patient belongings transferred with patient: No    Chart send with patient: Yes    Notified: family via surgical texting system     Patient reassessed at: 3/25/2025 at 1730  1  Upon arrival to floor: cardiac monitor applied, patient oriented to room, and bed in lowest position

## 2025-03-25 NOTE — ANESTHESIA POSTPROCEDURE EVALUATION
Anesthesia Post Evaluation    Patient: Marcos Elaine    Procedure(s) Performed: Procedure(s) (LRB):  REVISION, PACEMAKER LEAD (Left)    Final Anesthesia Type: general      Patient participation: Yes- Able to Participate  Level of consciousness: awake and alert  Post-procedure vital signs: reviewed and stable  Pain control: Pain has been treated.  Airway patency: patent    PONV status: Absent or treated.  Anesthetic complications: no      Cardiovascular status: hemodynamically stable  Respiratory status: unassisted  Hydration status: euvolemic  Follow-up not needed.              Vitals Value Taken Time   /76 03/25/25 16:17   Temp 36.2 °C (97.2 °F) 03/25/25 12:30   Pulse 74 03/25/25 16:19   Resp 20 03/25/25 16:19   SpO2 96 % 03/25/25 16:19   Vitals shown include unfiled device data.      No case tracking events are documented in the log.      Pain/Amaya Score: Amaya Score: 10 (3/25/2025 12:30 PM)

## 2025-03-25 NOTE — Clinical Note
There was an existing generator. The generator was removed. The leads were disconnected. The generator was returned to . The generator was returned due to upgrade

## 2025-03-25 NOTE — CONSULTS
"[facility] - Surgery (2nd Fl)  Pulmonology  Consult Note    Patient Name: Marcos Elaine  MRN: [id number]  Admission Date: 3/25/2025  Hospital Length of Stay: 0 days  Code Status: Full Code  Attending Physician: Jarod Hanson MD  Primary Care Provider: Hiren Gonzalez MD   Principal Problem: Presence of cardiac resynchronization therapy pacemaker (CRT-P)    Inpatient consult to Pulmonology  Consult performed by: Rajeev Payton MD  Consult ordered by: Ghazala Titus MD        Subjective:     HPI:  69 yoM CHB s/p DC PM with upgrade to CRT-P 10/14/19 ([facility]) with improvement in EF. He is here today for LV lead revision and reimplant. Procedure complicated by apical pneumothorax noted on the post-procedure CXR and pulmonary consulted for assistance with management of the new finding. Hx of smoking "years ago. He otherwise denies other lung problems.     Past Medical History:   Diagnosis Date    2:1 Second degree AV block 03/05/2013    Cardiac pacemaker 03/06/2013    [facility]    Heart block 03/06/2013    S/p DC PM    Hyperlipidemia     Personal history of colonic polyps 12/30/2013    Normal 12/13 ---Repeat colonoscopy in 10 years for screening purposes. (Previous polyps were both hyperplastic)       Past Surgical History:   Procedure Laterality Date    COLONOSCOPY N/A 1/22/2024    Procedure: COLONOSCOPY;  Surgeon: Israel Hartley MD;  Location: [facility] (4TH FLR);  Service: Endoscopy;  Laterality: N/A;  Referred by Dr. Gonzalez, pt requesting Dr. Hartley, [facility] Pacemaker, PEG, portal -ml  1/16- precall complete - ERW    EXCISIONAL HEMORRHOIDECTOMY N/A 6/7/2022    Procedure: HEMORRHOIDECTOMY;  Surgeon: Israel Hartley MD;  Location: [facility] OR 2ND FLR;  Service: Colon and Rectal;  Laterality: N/A;    HERNIA REPAIR      IMPLANTATION OF BIVENTRICULAR PERMANENT PACEMAKER AS UPGRADE TO EXISTING PACEMAKER N/A 10/14/2019    Procedure: Biventricular pacemaker upgrade;  Surgeon: Jarod HANNA" MD Valentin;  Location: [facility] EP LAB;  Service: Cardiology;  Laterality: N/A;  NICM, CRT-P upgrade, ANDREW, MAC, MB, 3 Prep *SJM PPM in situ*    inguinal hernia repair      x 2    INSERT / REPLACE / REMOVE PACEMAKER  2013    S/p DC PM/ replace w/Biventricular pacemaker upgrade 10/14/19    SHOULDER SURGERY Left        Review of patient's allergies indicates:  No Known Allergies    Family History       Problem Relation (Age of Onset)    Cancer Mother, Father    Diabetes Mother          Tobacco Use    Smoking status: Former     Types: Cigars     Quit date: 1973     Years since quittin.7     Passive exposure: Past    Smokeless tobacco: Never    Tobacco comments:     Patient Quit Smoking Cigars on 1973.   Substance and Sexual Activity    Alcohol use: Yes     Alcohol/week: 7.0 standard drinks of alcohol     Types: 5 Glasses of wine, 2 Cans of beer per week     Comment: Daily 1-2    Drug use: No    Sexual activity: Yes     Partners: Female         Physical Exam  Vitals and nursing note reviewed.   Constitutional:       Appearance: Normal appearance.   HENT:      Head: Normocephalic.   Cardiovascular:      Rate and Rhythm: Normal rate and regular rhythm. Paced.      Pulses: Normal pulses.      Heart sounds: Normal heart sounds.   Pulmonary:      Effort: Pulmonary effort is normal. No respiratory distress     Breath sounds: Normal breath sounds.   Abdominal:      General: Abdomen is flat.      Palpations: Abdomen is soft.   Musculoskeletal:      Left arm splinted s/p EP procedure.      Right lower leg: No edema.      Left lower leg: No edema.   Skin:     General: Skin is warm.   Neurological:      General: No focal deficit present.      Mental Status: He is alert.   Objective:     Vital Signs (Most Recent):  Temp: 97.2 °F (36.2 °C) (25 1230)  Pulse: 60 (25 1345)  Resp: 15 (25 1345)  BP: (!) 159/83 (25 1345)  SpO2: 97 % (25 1345) Vital Signs (24h Range):  Temp:  [97.2 °F (36.2  °C)-98.4 °F (36.9 °C)] 97.2 °F (36.2 °C)  Pulse:  [60-65] 60  Resp:  [14-18] 15  SpO2:  [96 %-100 %] 97 %  BP: (156-184)/(79-90) 159/83     Weight: 74.8 kg (165 lb)  Body mass index is 21.18 kg/m².      Intake/Output Summary (Last 24 hours) at 3/25/2025 1408  Last data filed at 3/25/2025 1204  Gross per 24 hour   Intake 800 ml   Output --   Net 800 ml        Physical Exam     Vents:       Lines/Drains/Airways       Peripheral Intravenous Line  Duration                  Peripheral IV - Single Lumen 03/25/25 0814 20 G Left;Posterior Hand <1 day         Peripheral IV - Single Lumen 03/25/25 0827 20 G Anterior;Right Forearm <1 day                          Significant Imaging:     CXR: I have reviewed all pertinent results/findings within the past 24 hours and my personal findings are:  Pneumothorax  Assessment/Plan:     Pulmonary  Pneumothorax, postprocedural  S/p lead exchange with EP the patient was found to have a small left sided apical pneumothorax. Upon evaluation by the Pulm team the patient was not in respiratory distress, HR, BP stable, and asymptomatic otherwise. Recovering well post-procedure.    Plan:   -- Repeat xray now   -- Admit to inpatient for continued monitoring   -- Further recommendation pending repeat cxr          Thank you for your consult. I will follow-up with patient. Please contact us if you have any additional questions.     Rajeev Payton MD  Pulmonology  [facility] - Surgery (2nd Fl)

## 2025-03-25 NOTE — SUBJECTIVE & OBJECTIVE
Past Medical History:   Diagnosis Date    2:1 Second degree AV block 03/05/2013    Cardiac pacemaker 03/06/2013    [facility]    Heart block 03/06/2013    S/p DC PM    Hyperlipidemia     Personal history of colonic polyps 12/30/2013    Normal 12/13 ---Repeat colonoscopy in 10 years for screening purposes. (Previous polyps were both hyperplastic)       Past Surgical History:   Procedure Laterality Date    COLONOSCOPY N/A 1/22/2024    Procedure: COLONOSCOPY;  Surgeon: Israel Hartley MD;  Location: [facility] ENDO (4TH FLR);  Service: Endoscopy;  Laterality: N/A;  Referred by Dr. Gonzalez, pt requesting Dr. Hartley, [facility] Pacemaker, PEG, portal -ml  1/16- precall complete - ERW    EXCISIONAL HEMORRHOIDECTOMY N/A 6/7/2022    Procedure: HEMORRHOIDECTOMY;  Surgeon: Israel Hartley MD;  Location: [facility] OR 2ND FLR;  Service: Colon and Rectal;  Laterality: N/A;    HERNIA REPAIR      IMPLANTATION OF BIVENTRICULAR PERMANENT PACEMAKER AS UPGRADE TO EXISTING PACEMAKER N/A 10/14/2019    Procedure: Biventricular pacemaker upgrade;  Surgeon: Jarod Hanson MD;  Location: [facility] EP LAB;  Service: Cardiology;  Laterality: N/A;  NICM, CRT-P upgrade, ANDREW, MAC, MB, 3 Prep *SJM PPM in situ*    inguinal hernia repair      x 2    INSERT / REPLACE / REMOVE PACEMAKER  03/06/2013    S/p DC PM/ replace w/Biventricular pacemaker upgrade 10/14/19    SHOULDER SURGERY Left        Review of patient's allergies indicates:  No Known Allergies    Current Facility-Administered Medications on File Prior to Encounter   Medication    0.9%  NaCl infusion    0.9%  NaCl infusion    mupirocin 2 % ointment     Current Outpatient Medications on File Prior to Encounter   Medication Sig    CALCIUM POLYCARBOPHIL (KONSYL FIBER ORAL) Take by mouth.    glucosamine-chondroitin 500-400 mg tablet Take 1 tablet by mouth 3 (three) times daily.    LORazepam (ATIVAN) 0.5 MG tablet TAKE 1 TABLET BY MOUTH EVERY 6 HOURS AS NEEDED FOR ANXIETY    multivitamin  capsule Take 1 capsule by mouth once daily.    simvastatin (ZOCOR) 40 MG tablet Take 1 tablet by mouth once daily     Family History       Problem Relation (Age of Onset)    Cancer Mother, Father    Diabetes Mother          Tobacco Use    Smoking status: Former     Types: Cigars     Quit date: 1973     Years since quittin.7     Passive exposure: Past    Smokeless tobacco: Never    Tobacco comments:     Patient Quit Smoking Cigars on 1973.   Substance and Sexual Activity    Alcohol use: Yes     Alcohol/week: 7.0 standard drinks of alcohol     Types: 5 Glasses of wine, 2 Cans of beer per week     Comment: Daily 1-2    Drug use: No    Sexual activity: Yes     Partners: Female     Review of Systems   All other systems reviewed and are negative.    Objective:     Vital Signs (Most Recent):    Vital Signs (24h Range):             There is no height or weight on file to calculate BMI.             Physical Exam  Vitals and nursing note reviewed.   Constitutional:       Appearance: Normal appearance.   HENT:      Head: Normocephalic.   Cardiovascular:      Rate and Rhythm: Normal rate and regular rhythm.      Pulses: Normal pulses.      Heart sounds: Normal heart sounds.   Pulmonary:      Effort: Pulmonary effort is normal.      Breath sounds: Normal breath sounds.   Abdominal:      General: Abdomen is flat.      Palpations: Abdomen is soft.   Musculoskeletal:      Right lower leg: No edema.      Left lower leg: No edema.   Skin:     General: Skin is warm.   Neurological:      General: No focal deficit present.      Mental Status: He is alert.            Significant Labs: All pertinent lab results from the last 24 hours have been reviewed.

## 2025-03-25 NOTE — ASSESSMENT & PLAN NOTE
69 yoM CHB s/p DC PM with upgrade to CRT-P 10/14/19 ([facility]) with improvement in EF. Back in 10/2022 he had an alert for LV lead impedance > 3000 Ohms. Changed LV configuration to D1-M2, adjusted LV Cap Pacing Margin to +0.5V, narrowed impedance monitoring to 1500 Ohms. His device has remained stable until most recently on 3/20/25, a new alert for elevated LV impedance of 2550 ohms. LV impedance was 1075 ohms on 2/26/2025. He is here today for LV lead revision and reimplant.     The risks, benefits and alternatives of the procedure were explained to the patient, patient's family and/or surrogate decision maker. Risks include (but not limited to) bleeding, hematoma, infection, pain, vascular damage, damage to structures surrounding the vasculature, injury to heart valves, injury to lung causing pneumothorax requiring tube placementmyocardial damage [perforation, valvular damage], puncture of the heart leading to pericardial effusion or tamponade requiring tube drainage, CVA, MI, and death. All questions were answered. Patient is understanding of these risks, and would like to proceed. Consents signed.

## 2025-03-25 NOTE — CARE UPDATE
Chest tube placed due to expanding pneumothorax.     STAT XR ordered  Plan to repeat XR in the AM    Will admit to CCU overnight for close monitoring.    Ghazala Titus MD PGY 8  Electrophysiology  Pager: [phone]  [facility]

## 2025-03-25 NOTE — HPI
69 yoM CHB s/p DC PM with upgrade to CRT-P 10/14/19.   He had 2:1 AVB s/p DC PM 3/6/2013. He progressed from intermittent RV pacing to 100% RV pacing over the past 2 years. He had change in EF from 55% to 45% with development of HF symptoms. PET stress was negative for ischemia. He went for CRT-P upgrade 10/14/19. ([facility] device).     1/21/2020: Since upgrade, improvement in HF symptoms. Normal device parameters.     10/11/2022: ALERT via M for LV lead impedance > 3000 Ohms  Device check: LV lead impedances >3000 Ohms in any configuration using proximal electrodeTested LV lead in multiple configurations, opted to use D1-M2: impedance 860 Ohms, capture threshold 1.75V @ 0.40ms. EKG in this configuration shows QRSd of 164ms. Reprogramming at this visit: Changed LV configuration to D1-M2, adjusted LV Cap Pacing Margin to +0.5V, narrowed impedance monitoring to 1500 Ohms     5/18/2023: Parameters now remain stable. No arrhythmia noted. 100% biventricular pacing.     Device Interrogation (2/26/2025) reveals an intrinsic SR with CHB with stable lead and device function. AMSx5, 4/5 events c/w RA lead oversensing. 1 AT/ST 1 min 28 seconds. No ventricular arrhythmias.  He paces 13% in the RA and >99% in the BiV. Estimated battery longevity 3 years. Increased MTR to 120bm to allow for adequate V tracking.     Remote transmission 3/20/25 received for elevated LV impedance of 2550 ohms. LV impedance was 1075 ohms on 2/26/2025.     He presents today for LV lead revision.

## 2025-03-25 NOTE — Clinical Note
A venogram was performed in the coronary sinus. The vessel was injected via hand injection  with 8 mL of contrast.

## 2025-03-25 NOTE — ASSESSMENT & PLAN NOTE
S/p lead exchange with EP the patient was found to have a small left sided apical pneumothorax. Upon evaluation by the Pulm team the patient was not in respiratory distress, HR, BP stable, and asymptomatic otherwise. Recovering well post-procedure.    Plan:   -- Repeat xray now   -- Admit to inpatient for continued monitoring   -- Further recommendation pending repeat cxr

## 2025-03-25 NOTE — H&P
[facility] - Short Stay Cardiac Unit  Cardiac Electrophysiology  History and Physical     Admission Date: 3/25/2025  Code Status: Prior   Attending Provider: Jarod Hanson MD   Principal Problem:Presence of cardiac resynchronization therapy pacemaker (CRT-P)    Subjective:     Chief Complaint:  LV lead malfunction, high impedence    HPI:  69 yoM CHB s/p DC PM with upgrade to CRT-P 10/14/19.   He had 2:1 AVB s/p DC PM 3/6/2013. He progressed from intermittent RV pacing to 100% RV pacing over the past 2 years. He had change in EF from 55% to 45% with development of HF symptoms. PET stress was negative for ischemia. He went for CRT-P upgrade 10/14/19. ([facility] device).     1/21/2020: Since upgrade, improvement in HF symptoms. Normal device parameters.     10/11/2022: ALERT via [facility] for LV lead impedance > 3000 Ohms  Device check: LV lead impedances >3000 Ohms in any configuration using proximal electrodeTested LV lead in multiple configurations, opted to use D1-M2: impedance 860 Ohms, capture threshold 1.75V @ 0.40ms. EKG in this configuration shows QRSd of 164ms. Reprogramming at this visit: Changed LV configuration to D1-M2, adjusted LV Cap Pacing Margin to +0.5V, narrowed impedance monitoring to 1500 Ohms     5/18/2023: Parameters now remain stable. No arrhythmia noted. 100% biventricular pacing.     Device Interrogation (2/26/2025) reveals an intrinsic SR with CHB with stable lead and device function. AMSx5, 4/5 events c/w RA lead oversensing. 1 AT/ST 1 min 28 seconds. No ventricular arrhythmias.  He paces 13% in the RA and >99% in the BiV. Estimated battery longevity 3 years. Increased MTR to 120bm to allow for adequate V tracking.     Remote transmission 3/20/25 received for elevated LV impedance of 2550 ohms. LV impedance was 1075 ohms on 2/26/2025.     He presents today for LV lead revision.          Past Medical History:   Diagnosis Date    2:1 Second degree AV block 03/05/2013    Cardiac pacemaker  03/06/2013    [facility]    Heart block 03/06/2013    S/p DC PM    Hyperlipidemia     Personal history of colonic polyps 12/30/2013    Normal 12/13 ---Repeat colonoscopy in 10 years for screening purposes. (Previous polyps were both hyperplastic)       Past Surgical History:   Procedure Laterality Date    COLONOSCOPY N/A 1/22/2024    Procedure: COLONOSCOPY;  Surgeon: Israel Hartley MD;  Location: [facility] ENDO (4TH FLR);  Service: Endoscopy;  Laterality: N/A;  Referred by Dr. Gonzalez, pt requesting Dr. Hartley, [facility] Pacemaker, PEG, portal -ml  1/16- precall complete - ERW    EXCISIONAL HEMORRHOIDECTOMY N/A 6/7/2022    Procedure: HEMORRHOIDECTOMY;  Surgeon: Israel Hartley MD;  Location: [facility] OR 2ND FLR;  Service: Colon and Rectal;  Laterality: N/A;    HERNIA REPAIR      IMPLANTATION OF BIVENTRICULAR PERMANENT PACEMAKER AS UPGRADE TO EXISTING PACEMAKER N/A 10/14/2019    Procedure: Biventricular pacemaker upgrade;  Surgeon: Jarod Hanson MD;  Location: [facility] EP LAB;  Service: Cardiology;  Laterality: N/A;  NICM, CRT-P upgrade, SJM, MAC, MB, 3 Prep *SJM PPM in situ*    inguinal hernia repair      x 2    INSERT / REPLACE / REMOVE PACEMAKER  03/06/2013    S/p DC PM/ replace w/Biventricular pacemaker upgrade 10/14/19    SHOULDER SURGERY Left        Review of patient's allergies indicates:  No Known Allergies    Current Facility-Administered Medications on File Prior to Encounter   Medication    0.9%  NaCl infusion    0.9%  NaCl infusion    mupirocin 2 % ointment     Current Outpatient Medications on File Prior to Encounter   Medication Sig    CALCIUM POLYCARBOPHIL (KONSYL FIBER ORAL) Take by mouth.    glucosamine-chondroitin 500-400 mg tablet Take 1 tablet by mouth 3 (three) times daily.    LORazepam (ATIVAN) 0.5 MG tablet TAKE 1 TABLET BY MOUTH EVERY 6 HOURS AS NEEDED FOR ANXIETY    multivitamin capsule Take 1 capsule by mouth once daily.    simvastatin (ZOCOR) 40 MG tablet Take 1 tablet by mouth  once daily     Family History       Problem Relation (Age of Onset)    Cancer Mother, Father    Diabetes Mother          Tobacco Use    Smoking status: Former     Types: Cigars     Quit date: 1973     Years since quittin.7     Passive exposure: Past    Smokeless tobacco: Never    Tobacco comments:     Patient Quit Smoking Cigars on 1973.   Substance and Sexual Activity    Alcohol use: Yes     Alcohol/week: 7.0 standard drinks of alcohol     Types: 5 Glasses of wine, 2 Cans of beer per week     Comment: Daily 1-2    Drug use: No    Sexual activity: Yes     Partners: Female     Review of Systems   All other systems reviewed and are negative.    Objective:     Vital Signs (Most Recent):    Vital Signs (24h Range):             There is no height or weight on file to calculate BMI.             Physical Exam  Vitals and nursing note reviewed.   Constitutional:       Appearance: Normal appearance.   HENT:      Head: Normocephalic.   Cardiovascular:      Rate and Rhythm: Normal rate and regular rhythm.      Pulses: Normal pulses.      Heart sounds: Normal heart sounds.   Pulmonary:      Effort: Pulmonary effort is normal.      Breath sounds: Normal breath sounds.   Abdominal:      General: Abdomen is flat.      Palpations: Abdomen is soft.   Musculoskeletal:      Right lower leg: No edema.      Left lower leg: No edema.   Skin:     General: Skin is warm.   Neurological:      General: No focal deficit present.      Mental Status: He is alert.            Significant Labs: All pertinent lab results from the last 24 hours have been reviewed.    Assessment and Plan:     * Presence of cardiac resynchronization therapy pacemaker (CRT-P)  69 yoM CHB s/p DC PM with upgrade to CRT-P 10/14/19 ([facility]) with improvement in EF. Back in 10/2022 he had an alert for LV lead impedance > 3000 Ohms. Changed LV configuration to D1-M2, adjusted LV Cap Pacing Margin to +0.5V, narrowed impedance monitoring to 1500 Ohms. His device  has remained stable until most recently on 3/20/25, a new alert for elevated LV impedance of 2550 ohms. LV impedance was 1075 ohms on 2/26/2025. He is here today for LV lead revision and reimplant.     The risks, benefits and alternatives of the procedure were explained to the patient, patient's family and/or surrogate decision maker. Risks include (but not limited to) bleeding, hematoma, infection, pain, vascular damage, damage to structures surrounding the vasculature, injury to heart valves, injury to lung causing pneumothorax requiring tube placementmyocardial damage [perforation, valvular damage], puncture of the heart leading to pericardial effusion or tamponade requiring tube drainage, CVA, MI, and death. All questions were answered. Patient is understanding of these risks, and would like to proceed. Consents signed.         Ghazala Titus MD  Cardiac Electrophysiology  [facility] - Short Stay Cardiac Unit

## 2025-03-25 NOTE — BRIEF OP NOTE
Attending: Jarod Hanson MD  Date of Procedure: 03/25/2025    Post-operative Diagnosis: LV lead revision and reimplant    Procedure Performed: LV lead revision and implant of LBA lead placement    Description of Procedure: The patient was brought to the EP lab in the fasting state. Prepped and draped in sterile fashion. Safety timeout was performed. Sedation administered by anesthesia staff. Selective venogram of the left axillary and cephalic veins performed via left ac IV. Lidocaine used for local anesthetic. Fluoroscopic guided axillary access utilized. Guide/J Wire advanced and confirmed in IVC. Incision made. Blunt and electrocautery dissection performed. Pocket made. Sheath advanced to the interventricular septum. Attempted to remove CS lead however bound to other lead (suspect around SVC area). Lead placed through sheath, and advanced through the septum. V1 activation transitioned to RBBB-like morphology with decreasing impendence and LV activation time monitoring via lateral precordial leads. Difference in Adequate current of injury, sensing, and thresholds obtained. Sheath was slit with stable lead positioning by fluoroscopy. Lead parameters confirmed adequate and sutured into place.   Pocket washed using antibiotic solution. Leads connected to generator. Generator placed into pocket, sutured in place, and washed with antibiotic solution. Deep layer closed with interrupted 3-0 suture. Intermediate layer closed with running 3-0 suture. Superficial layer closed with running 4-0 suture. Skin closed with Dermabond. Dressing to be placed after Dermabond dried.    Final values:  V6RWPT = 80 ms  V6-V1 interpeak interval = 53 ms    EBL: <10 mL    Specimens: none  Complications: apical pneumothorax with post op CXR    Post-CIED implantation instructions:  Doxycycline 100 mg BID x 5 days starting this evening  Avoid all heparin products (e.g., DOACs, enoxaparin, heparin) for 5 days following implant. If the  patient is taking coumadin, this can be continued uninterrupted  CXR & ECG (ordered)  Sling to arm for first 48 hours continuously, then only nightly for 6 weeks  No lifting elbow above shoulder height on arm ipsilateral to implant device for 6 weeks  No lifting over 5 lbs. for 2 weeks with arm ipsilateral to implanted device  No driving for 1 week if patient uses arm contralateral to implantation and 4 weeks if patient uses arm ipsilateral to implantation  Patient can shower starting post-procedure day 1. Do not submerge surgical site for 1 month (e.g., bathing or swimming)  Dressing can be removed tomorrow (Mepilex dressing only) or in 1 week at device clinic follow up (Aquacel dressing only)  Patient will be scheduled for device clinic follow up in 1 week to assess surgical site and device interrogation  Please contact EP for any questions or concerns at [phone] or page EP fellow on call  Patient is to seek immediate medical attention for acute onset of chest pain, shortness of breath, syncope, or evidence of surgical site infection or hematoma.    Admit to observation overnight. Will be evaluated by EP in the AM.     The attending physician was present for the entire duration of the procedure    Ghazala Titus MD, PGY8  Electrophysiology

## 2025-03-25 NOTE — TRANSFER OF CARE
"Anesthesia Transfer of Care Note    Patient: Marcos Elaine    Procedure(s) Performed: Procedure(s) (LRB):  REVISION, PACEMAKER LEAD (Left)    Patient location: PACU    Anesthesia Type: general    Transport from OR: Transported from OR on 6-10 L/min O2 by face mask with adequate spontaneous ventilation    Post pain: adequate analgesia    Post assessment: no apparent anesthetic complications and tolerated procedure well    Post vital signs: stable    Level of consciousness: awake    Nausea/Vomiting: no nausea/vomiting    Complications: none    Transfer of care protocol was followed      Last vitals: Visit Vitals  BP (!) 177/79 (BP Location: Left arm, Patient Position: Lying)   Pulse 62   Temp 36.9 °C (98.4 °F) (Temporal)   Resp 16   Ht 6' 2" (1.88 m)   Wt 74.8 kg (165 lb)   SpO2 98%   BMI 21.18 kg/m²     "

## 2025-03-26 LAB
ABSOLUTE EOSINOPHIL (OHS): 0.02 K/UL
ABSOLUTE MONOCYTE (OHS): 0.51 K/UL (ref 0.3–1)
ABSOLUTE NEUTROPHIL COUNT (OHS): 6.18 K/UL (ref 1.8–7.7)
ALBUMIN SERPL BCP-MCNC: 3.4 G/DL (ref 3.5–5.2)
ALP SERPL-CCNC: 77 UNIT/L (ref 40–150)
ALT SERPL W/O P-5'-P-CCNC: 18 UNIT/L (ref 10–44)
ANION GAP (OHS): 7 MMOL/L (ref 8–16)
AST SERPL-CCNC: 21 UNIT/L (ref 11–45)
BASOPHILS # BLD AUTO: 0.02 K/UL
BASOPHILS NFR BLD AUTO: 0.3 %
BILIRUB SERPL-MCNC: 0.8 MG/DL (ref 0.1–1)
BUN SERPL-MCNC: 10 MG/DL (ref 8–23)
CALCIUM SERPL-MCNC: 8.8 MG/DL (ref 8.7–10.5)
CHLORIDE SERPL-SCNC: 107 MMOL/L (ref 95–110)
CO2 SERPL-SCNC: 24 MMOL/L (ref 23–29)
CREAT SERPL-MCNC: 0.7 MG/DL (ref 0.5–1.4)
ERYTHROCYTE [DISTWIDTH] IN BLOOD BY AUTOMATED COUNT: 13.5 % (ref 11.5–14.5)
GFR SERPLBLD CREATININE-BSD FMLA CKD-EPI: >60 ML/MIN/1.73/M2
GLUCOSE SERPL-MCNC: 115 MG/DL (ref 70–110)
HCT VFR BLD AUTO: 44.1 % (ref 40–54)
HGB BLD-MCNC: 14.5 GM/DL (ref 14–18)
IMM GRANULOCYTES # BLD AUTO: 0.02 K/UL (ref 0–0.04)
IMM GRANULOCYTES NFR BLD AUTO: 0.3 % (ref 0–0.5)
LYMPHOCYTES # BLD AUTO: 0.86 K/UL (ref 1–4.8)
MCH RBC QN AUTO: 30 PG (ref 27–50)
MCHC RBC AUTO-ENTMCNC: 32.9 G/DL (ref 32–36)
MCV RBC AUTO: 91 FL (ref 82–98)
NUCLEATED RBC (/100WBC) (OHS): 0 /100 WBC
OHS QRS DURATION: 148 MS
OHS QTC CALCULATION: 495 MS
PLATELET # BLD AUTO: 207 K/UL (ref 150–450)
PMV BLD AUTO: 11.6 FL (ref 9.2–12.9)
POTASSIUM SERPL-SCNC: 3.8 MMOL/L (ref 3.5–5.1)
PROT SERPL-MCNC: 6.9 GM/DL (ref 6–8.4)
RBC # BLD AUTO: 4.84 M/UL (ref 4.6–6.2)
RELATIVE EOSINOPHIL (OHS): 0.3 %
RELATIVE LYMPHOCYTE (OHS): 11.3 % (ref 18–48)
RELATIVE MONOCYTE (OHS): 6.7 % (ref 4–15)
RELATIVE NEUTROPHIL (OHS): 81.1 % (ref 38–73)
SODIUM SERPL-SCNC: 138 MMOL/L (ref 136–145)
WBC # BLD AUTO: 7.61 K/UL (ref 3.9–12.7)

## 2025-03-26 PROCEDURE — 99900035 HC TECH TIME PER 15 MIN (STAT)

## 2025-03-26 PROCEDURE — 99232 SBSQ HOSP IP/OBS MODERATE 35: CPT | Mod: ,,, | Performed by: INTERNAL MEDICINE

## 2025-03-26 PROCEDURE — 85025 COMPLETE CBC W/AUTO DIFF WBC: CPT | Performed by: STUDENT IN AN ORGANIZED HEALTH CARE EDUCATION/TRAINING PROGRAM

## 2025-03-26 PROCEDURE — 94761 N-INVAS EAR/PLS OXIMETRY MLT: CPT

## 2025-03-26 PROCEDURE — 99233 SBSQ HOSP IP/OBS HIGH 50: CPT | Mod: ,,, | Performed by: INTERNAL MEDICINE

## 2025-03-26 PROCEDURE — 20000000 HC ICU ROOM

## 2025-03-26 PROCEDURE — 27000221 HC OXYGEN, UP TO 24 HOURS

## 2025-03-26 PROCEDURE — 25000003 PHARM REV CODE 250: Performed by: STUDENT IN AN ORGANIZED HEALTH CARE EDUCATION/TRAINING PROGRAM

## 2025-03-26 PROCEDURE — 82435 ASSAY OF BLOOD CHLORIDE: CPT | Performed by: STUDENT IN AN ORGANIZED HEALTH CARE EDUCATION/TRAINING PROGRAM

## 2025-03-26 RX ADMIN — DOXYCYCLINE HYCLATE 100 MG: 100 TABLET, COATED ORAL at 08:03

## 2025-03-26 RX ADMIN — ACETAMINOPHEN 650 MG: 325 TABLET ORAL at 08:03

## 2025-03-26 NOTE — H&P
[facility]  Cardiology  History and Physical     Patient Name: Marcos Elaine  MRN: [id number]  Admission Date: 3/25/2025  Code Status: Full Code   Attending Provider: Jarod Hanson MD   Primary Care Physician: Hiren Gonzalez MD  Principal Problem:Presence of cardiac resynchronization therapy pacemaker (CRT-P)    Patient information was obtained from patient, past medical records, and ER records.     Subjective:          HPI:  69 yoM CHB s/p DC PM with upgrade to CRT-P 10/14/19 ([facility]) with improvement in EF. He is here today for LV lead revision and reimplant. Procedure complicated by apical pneumothorax noted on the post-procedure CXR. Patient is now s/p thoracenrtesis and is being admitted to CICU for close monitoring.     Past Medical History:   Diagnosis Date    2:1 Second degree AV block 03/05/2013    Cardiac pacemaker 03/06/2013    [facility]    Heart block 03/06/2013    S/p DC PM    Hyperlipidemia     Personal history of colonic polyps 12/30/2013    Normal 12/13 ---Repeat colonoscopy in 10 years for screening purposes. (Previous polyps were both hyperplastic)       Past Surgical History:   Procedure Laterality Date    COLONOSCOPY N/A 1/22/2024    Procedure: COLONOSCOPY;  Surgeon: Israel Hartley MD;  Location: [facility] (4TH FLR);  Service: Endoscopy;  Laterality: N/A;  Referred by Dr. Gonzalez, pt requesting Dr. Hartley, [facility] Pacemaker, PEG, portal -ml  1/16- precall complete - ERW    EXCISIONAL HEMORRHOIDECTOMY N/A 6/7/2022    Procedure: HEMORRHOIDECTOMY;  Surgeon: Israel Hartley MD;  Location: [facility] OR 2ND FLR;  Service: Colon and Rectal;  Laterality: N/A;    HERNIA REPAIR      IMPLANTATION OF BIVENTRICULAR PERMANENT PACEMAKER AS UPGRADE TO EXISTING PACEMAKER N/A 10/14/2019    Procedure: Biventricular pacemaker upgrade;  Surgeon: Jarod Hanson MD;  Location: [facility] EP LAB;  Service: Cardiology;  Laterality: N/A;  NICM, CRT-P upgrade, SJM, MAC,  MB, 3 Prep *SJM PPM in situ*    inguinal hernia repair      x 2    INSERT / REPLACE / REMOVE PACEMAKER  2013    S/p DC PM/ replace w/Biventricular pacemaker upgrade 10/14/19    SHOULDER SURGERY Left        Review of patient's allergies indicates:  No Known Allergies    Current Facility-Administered Medications on File Prior to Encounter   Medication    0.9%  NaCl infusion    0.9%  NaCl infusion    mupirocin 2 % ointment     Current Outpatient Medications on File Prior to Encounter   Medication Sig    CALCIUM POLYCARBOPHIL (KONSYL FIBER ORAL) Take by mouth.    glucosamine-chondroitin 500-400 mg tablet Take 1 tablet by mouth 3 (three) times daily.    multivitamin capsule Take 1 capsule by mouth once daily.    simvastatin (ZOCOR) 40 MG tablet Take 1 tablet by mouth once daily    LORazepam (ATIVAN) 0.5 MG tablet TAKE 1 TABLET BY MOUTH EVERY 6 HOURS AS NEEDED FOR ANXIETY     Family History       Problem Relation (Age of Onset)    Cancer Mother, Father    Diabetes Mother          Tobacco Use    Smoking status: Former     Types: Cigars     Quit date: 1973     Years since quittin.7     Passive exposure: Past    Smokeless tobacco: Never    Tobacco comments:     Patient Quit Smoking Cigars on 1973.   Substance and Sexual Activity    Alcohol use: Yes     Alcohol/week: 7.0 standard drinks of alcohol     Types: 5 Glasses of wine, 2 Cans of beer per week     Comment: Daily 1-2    Drug use: No    Sexual activity: Yes     Partners: Female     Review of Systems   All other systems reviewed and are negative.    Objective:     Vital Signs (Most Recent):  Temp: 97.8 °F (36.6 °C) (25 1839)  Pulse: 76 (25)  Resp: 16 (25)  BP: (!) 151/81 (25 1847)  SpO2: 98 % (25) Vital Signs (24h Range):  Temp:  [97.2 °F (36.2 °C)-98.4 °F (36.9 °C)] 97.8 °F (36.6 °C)  Pulse:  [60-76] 76  Resp:  [14-21] 16  SpO2:  [95 %-100 %] 98 %  BP: ()/(38-92) 151/81     Weight: 79.2 kg (174 lb  9.7 oz)  Body mass index is 22.42 kg/m².    SpO2: 98 %         Intake/Output Summary (Last 24 hours) at 3/25/2025 2035  Last data filed at 3/25/2025 1849  Gross per 24 hour   Intake 800 ml   Output 490 ml   Net 310 ml       Lines/Drains/Airways       Drain  Duration                  Chest Tube 03/25/25 1645 Left <1 day              Peripheral Intravenous Line  Duration                  Peripheral IV - Single Lumen 03/25/25 0814 20 G Left;Posterior Hand <1 day         Peripheral IV - Single Lumen 03/25/25 0827 20 G Anterior;Right Forearm <1 day                     Physical Exam  HENT:      Head: Normocephalic.      Mouth/Throat:      Mouth: Mucous membranes are moist.   Eyes:      Pupils: Pupils are equal, round, and reactive to light.   Cardiovascular:      Rate and Rhythm: Normal rate and regular rhythm.      Pulses:           Radial pulses are 2+ on the right side and 2+ on the left side.        Dorsalis pedis pulses are 2+ on the right side and 2+ on the left side.        Posterior tibial pulses are 2+ on the right side and 2+ on the left side.      Heart sounds: Normal heart sounds, S1 normal and S2 normal.   Pulmonary:      Effort: Pulmonary effort is normal.   Chest:      Comments: Chest tube connected to suction   Musculoskeletal:         General: Normal range of motion.      Right lower leg: No edema.      Left lower leg: No edema.   Skin:     General: Skin is warm.      Capillary Refill: Capillary refill takes less than 2 seconds.   Neurological:      Mental Status: He is alert.          Significant Labs:   Recent Lab Results         03/25/25  1236   03/25/25  0841        QRS Duration 160   176       [facility] QTC Calculation 507   513               Assessment and Plan:     Pneumothorax, postprocedural  Complication after CRT-P LV lead.   CXR after procedure complicated by apical pneumothorax  S/p Thoracentesis by pulm   Admitted to CICU for monitoring   Plan:   - CXR in the morning   - Pulm following   - Chest  tube connected to suction         VTE Risk Mitigation (From admission, onward)      None            Zaheer Huitron MD  Cardiology   [facility]

## 2025-03-26 NOTE — ASSESSMENT & PLAN NOTE
Complication after CRT-P LV lead.   CXR after procedure complicated by apical pneumothorax  S/p Thoracentesis by pulm   Admitted to CICU for monitoring   Plan:   - CXR in the morning   - Pulm following   - Chest tube connected to suction

## 2025-03-26 NOTE — HPI
69 yoM CHB s/p DC PM with upgrade to CRT-P 10/14/19 ([facility]) with improvement in EF. He is here today for LV lead revision and reimplant. Procedure complicated by apical pneumothorax noted on the post-procedure CXR. Patient is now s/p thoracenrtesis and is being admitted to CICU for close monitoring.

## 2025-03-26 NOTE — ASSESSMENT & PLAN NOTE
Occurred in the s/o procedure complications from pacemaker lead revision and implant on 03/25. Chest tube placed by pulmonology for expanding pneumothorax post procedure. Cxr this AM appears improved, though chest tube with ongoing air leak.     - Pulmonology following and managing chest tube, appreciate recs  - Chest tube remains in place, to suction

## 2025-03-26 NOTE — PROGRESS NOTES
"[facility]  Cardiac Electrophysiology  Progress Note    Admission Date: 3/25/2025  Code Status: Full Code   Attending Physician: Jarod Hanson MD   Expected Discharge Date:   Principal Problem:Presence of cardiac resynchronization therapy pacemaker (CRT-P)    Subjective:     Interval History:   - Admitted yesterday afternoon following LV lead revision and implant of LBA lead with new onset L sided ptx. Chest tube placed to suction yesterday by pulmonology.  - NAEON, VSS, not on supplemental o2 this AM  - This AM patient feeling well, denies pain or dyspnea  - Tele V paced 60-70s    Objective:     Vital Signs (Most Recent):  Temp: 98.2 °F (36.8 °C) (03/26/25 0701)  Pulse: 76 (03/26/25 0815)  Resp: 12 (03/25/25 2039)  BP: (!) 154/78 (03/26/25 0800)  SpO2: 100 % (03/26/25 0815) Vital Signs (24h Range):  Temp:  [97.2 °F (36.2 °C)-98.2 °F (36.8 °C)] 98.2 °F (36.8 °C)  Pulse:  [60-76] 76  Resp:  [12-21] 12  SpO2:  [95 %-100 %] 100 %  BP: ()/(38-92) 154/78     Weight: 79.2 kg (174 lb 9.7 oz)  Body mass index is 22.42 kg/m².     SpO2: 100 %        Physical Exam  Constitutional:       Appearance: Normal appearance.   HENT:      Mouth/Throat:      Mouth: Mucous membranes are moist.   Eyes:      Extraocular Movements: Extraocular movements intact.      Conjunctiva/sclera: Conjunctivae normal.   Cardiovascular:      Rate and Rhythm: Normal rate and regular rhythm.      Comments: PPM site without hematoma or induration  Pulmonary:      Effort: Pulmonary effort is normal.      Comments: L sided chest tube in place  Abdominal:      General: Abdomen is flat.      Palpations: Abdomen is soft.   Skin:     General: Skin is warm and dry.   Neurological:      General: No focal deficit present.      Mental Status: He is oriented to person, place, and time.            Significant Labs: CMP: No results for input(s): "NA", "K", "CL", "CO2", "GLU", "BUN", "CREATININE", "CALCIUM", "PROT", "ALBUMIN", " ""BILITOT", "ALKPHOS", "AST", "ALT", "ANIONGAP", "ESTGFRAFRICA", "EGFRNONAA" in the last 48 hours., CBC: No results for input(s): "WBC", "HGB", "HCT", "PLT" in the last 48 hours., and INR: No results for input(s): "INR", "PROTIME" in the last 48 hours.    Significant Imaging: X-Ray: CXR: X-Ray Chest 1 View (CXR):   Results for orders placed or performed during the hospital encounter of 03/25/25   X-Ray Chest 1 View    Narrative    EXAMINATION:  XR CHEST 1 VIEW    CLINICAL HISTORY:  chest tube in place for pneumothorax;    TECHNIQUE:  Single frontal view of the chest was performed.    COMPARISON:  03/25/2025    FINDINGS:  Left pleural drainage catheter has been placed with near complete re-expansion of the left hemithorax.  Small left apical pneumothorax remains.  No detrimental change in the cardiopulmonary status otherwise.      Impression    As above      Electronically signed by: Vernon Lanier MD  Date:    03/25/2025  Time:    19:38     Assessment and Plan:     * Presence of cardiac resynchronization therapy pacemaker (CRT-P)  70 yo M CHB s/p DC PM with upgrade to CRT-P 10/14/19 ([facility]) with improvement in EF. Back in 10/2022 he had an alert for LV lead impedance > 3000 Ohms. Changed LV configuration to D1-M2, adjusted LV Cap Pacing Margin to +0.5V, narrowed impedance monitoring to 1500 Ohms. His device remained stable until 3/20/25 when there was a new alert for elevated LV impedance of 2550 ohms. LV impedance was 1075 ohms on 2/26/2025.    Patient s/p LV lead revision and implant of LBA lead placement on 03/25. Post procedure ECG reviewed, appriopriate pacing. Procedure was complicated by post procedural ptx, see below.     Pneumothorax, postprocedural  Occurred in the s/o procedure complications from pacemaker lead revision and implant on 03/25. Chest tube placed by pulmonology for expanding pneumothorax post procedure. Cxr this AM appears improved, though chest tube with ongoing air leak.     - Pulmonology " following and managing chest tube, appreciate recs  - Chest tube remains in place, to suction      Migdalia Martinez MD  Cardiac Electrophysiology  [facility] - Cardiac Intensive Care

## 2025-03-26 NOTE — PROGRESS NOTES
[facility]  Cardiology  Progress Note    Patient Name: Marcos Elaine  MRN: [id number]  Admission Date: 3/25/2025  Hospital Length of Stay: 1 days  Code Status: Full Code   Attending Physician: Jarod Hanson MD   Primary Care Physician: Hiren Gonzalez MD  Expected Discharge Date: 3/28/2025  Principal Problem:Presence of cardiac resynchronization therapy pacemaker (CRT-P)    Subjective:     Hospital Course:   Chest tube placed in the PACU yesterday. On evaluation today 3/26 continues to have intermittent air leak to chest tube.    Interval History: NAEON      Objective:     Vital Signs (Most Recent):  Temp: 98.4 °F (36.9 °C) (03/26/25 1100)  Pulse: 61 (03/26/25 1100)  Resp: 12 (03/25/25 2039)  BP: (!) 147/77 (03/26/25 1100)  SpO2: 96 % (03/26/25 1100) Vital Signs (24h Range):  Temp:  [97.2 °F (36.2 °C)-98.4 °F (36.9 °C)] 98.4 °F (36.9 °C)  Pulse:  [60-76] 61  Resp:  [12-21] 12  SpO2:  [95 %-100 %] 96 %  BP: ()/(38-92) 147/77     Weight: 79.2 kg (174 lb 9.7 oz)  Body mass index is 22.42 kg/m².     SpO2: 96 %         Intake/Output Summary (Last 24 hours) at 3/26/2025 1116  Last data filed at 3/26/2025 1100  Gross per 24 hour   Intake 860 ml   Output 1763 ml   Net -903 ml       Lines/Drains/Airways       Drain  Duration                  Chest Tube 03/25/25 1645 Left <1 day              Peripheral Intravenous Line  Duration                  Peripheral IV - Single Lumen 03/25/25 0814 20 G Left;Posterior Hand 1 day         Peripheral IV - Single Lumen 03/25/25 0827 20 G Anterior;Right Forearm 1 day                       Physical Exam  Constitutional:       Appearance: Normal appearance.   HENT:      Mouth/Throat:      Mouth: Mucous membranes are moist.   Eyes:      Extraocular Movements: Extraocular movements intact.      Conjunctiva/sclera: Conjunctivae normal.   Cardiovascular:      Rate and Rhythm: Normal rate and regular rhythm.      Comments: PPM site without hematoma or  induration  Pulmonary:      Effort: Pulmonary effort is normal.      Comments: L sided chest tube in place  Abdominal:      General: Abdomen is flat.      Palpations: Abdomen is soft.   Skin:     General: Skin is warm and dry.   Neurological:      General: No focal deficit present.      Mental Status: He is oriented to person, place, and time.            Significant Labs: All pertinent lab results from the last 24 hours have been reviewed.    Significant Imaging: X-Ray: CXR: X-Ray Chest 1 View (CXR):   Results for orders placed or performed during the hospital encounter of 03/25/25   X-Ray Chest 1 View    Narrative    EXAMINATION:  XR CHEST 1 VIEW    CLINICAL HISTORY:  chest tube in place for pneumothorax;    TECHNIQUE:  Single frontal view of the chest was performed.    COMPARISON:  03/25/2025    FINDINGS:  Left pleural drainage catheter has been placed with near complete re-expansion of the left hemithorax.  Small left apical pneumothorax remains.  No detrimental change in the cardiopulmonary status otherwise.      Impression    As above      Electronically signed by: Vernon Lanier MD  Date:    03/25/2025  Time:    19:38     Assessment and Plan:     Brief HPI: 69 yoM CHB s/p DC PM with upgrade to CRT-P 10/14/19 ([facility]) with improvement in EF. He is here today for LV lead revision and reimplant. Procedure complicated by apical pneumothorax noted on the post-procedure CXR. Patient is now s/p thoracenrtesis and is being admitted to CICU for close monitoring.    Pneumothorax, postprocedural  Complication after CRT-P LV lead.   CXR after procedure complicated by apical pneumothorax  S/p Thoracentesis by pulm   Admitted to CICU for monitoring   Plan:   - CXR in the morning   - Pulm following   - Chest tube connected to suction         VTE Risk Mitigation (From admission, onward)      None            Norberto Vides MD  Cardiology  [facility]

## 2025-03-26 NOTE — PROGRESS NOTES
"[facility]  Pulmonology  Progress Note    Patient Name: Marcos Elaine  MRN: [id number]  Admission Date: 3/25/2025  Hospital Length of Stay: 1 days  Code Status: Full Code  Attending Provider: Jarod Hanson MD  Primary Care Provider: Hiren Gonzalez MD   Principal Problem: Presence of cardiac resynchronization therapy pacemaker (CRT-P)    Subjective:     HPI:  69 yoM CHB s/p DC PM with upgrade to CRT-P 10/14/19 ([facility]) with improvement in EF. He is here today for LV lead revision and reimplant. Procedure complicated by apical pneumothorax noted on the post-procedure CXR and pulmonary consulted for assistance with management of the new finding. Hx of smoking "years ago. He otherwise denies other lung problems.     Overview/Hospital Course:  No notes on file    Interval History: Chest tube placed in the PACU yesterday. During the placement the patient became altered, diaphoretic, and MAP dropped to 50 w/ systolic's in the 60s. Recovered hemodynamically after the chest tube placed. This morning the chest tube continues to have an air leak although the CXR shows full re-expansion of the lung with minimal remaining pneumothorax. Plan to monitor for one day before considering discussing with CTS and imaging further.       Objective:     Vital Signs (Most Recent):  Temp: 98.2 °F (36.8 °C) (03/26/25 0701)  Pulse: 74 (03/26/25 0901)  Resp: 12 (03/25/25 2039)  BP: (!) 158/77 (03/26/25 0900)  SpO2: 97 % (03/26/25 0901) Vital Signs (24h Range):  Temp:  [97.2 °F (36.2 °C)-98.2 °F (36.8 °C)] 98.2 °F (36.8 °C)  Pulse:  [60-76] 74  Resp:  [12-21] 12  SpO2:  [95 %-100 %] 97 %  BP: ()/(38-92) 158/77     Weight: 79.2 kg (174 lb 9.7 oz)  Body mass index is 22.42 kg/m².      Intake/Output Summary (Last 24 hours) at 3/26/2025 1014  Last data filed at 3/26/2025 0800  Gross per 24 hour   Intake 860 ml   Output 1513 ml   Net -653 ml        Physical Exam  Constitutional:       Appearance: Normal " appearance.   HENT:      Mouth/Throat:      Mouth: Mucous membranes are moist.   Eyes:      Extraocular Movements: Extraocular movements intact.      Conjunctiva/sclera: Conjunctivae normal.   Cardiovascular:      Rate and Rhythm: Normal rate and regular rhythm.      Comments: PPM site without hematoma or induration  Pulmonary:      Effort: Pulmonary effort is normal.      Comments: L sided chest tube in place,   Abdominal:      General: Abdomen is flat.      Palpations: Abdomen is soft.   Skin:     General: Skin is warm and dry.   Neurological:      General: No focal deficit present.      Mental Status: He is oriented to person, place, and time.          Vents:       Lines/Drains/Airways       Drain  Duration                  Chest Tube 03/25/25 1645 Left <1 day              Peripheral Intravenous Line  Duration                  Peripheral IV - Single Lumen 03/25/25 0814 20 G Left;Posterior Hand 1 day         Peripheral IV - Single Lumen 03/25/25 0827 20 G Anterior;Right Forearm 1 day                    Significant Labs:    All pertinent labs within the past 24 hours have been reviewed.    Significant Imaging:  I have reviewed all pertinent imaging results/findings within the past 24 hours.  Assessment/Plan:     Pulmonary  Pneumothorax, postprocedural  S/p lead exchange with EP the patient was found to have a small left sided apical pneumothorax. Upon evaluation by the Pulm team the patient was not in respiratory distress, HR, BP stable, and asymptomatic otherwise. Recovering well post-procedure.    Chest tube placed in the PACU yesterday. On evaluation today 3/26 continues to have intermittent air leak to chest tube.     Plan:   -- Keep chest tube to suction   -- Pulmonary will continue to follow                   Rajeev Payton MD  Pulmonology  [facility]

## 2025-03-26 NOTE — HOSPITAL COURSE
Chest tube placed in the PACU yesterday after PTX s/p PPM battery and lead change on 03/25/2025. CT chest no evidence of COPD/emphysema (slow healing from PTX). Chest tube clamped and removed on 03/28/2025. Stable for discharge on exam.

## 2025-03-26 NOTE — ASSESSMENT & PLAN NOTE
70 yo M CHB s/p DC PM with upgrade to CRT-P 10/14/19 ([facility]) with improvement in EF. Back in 10/2022 he had an alert for LV lead impedance > 3000 Ohms. Changed LV configuration to D1-M2, adjusted LV Cap Pacing Margin to +0.5V, narrowed impedance monitoring to 1500 Ohms. His device remained stable until 3/20/25 when there was a new alert for elevated LV impedance of 2550 ohms. LV impedance was 1075 ohms on 2/26/2025.    Patient s/p LV lead revision and implant of LBA lead placement on 03/25. Post procedure ECG reviewed, appriopriate pacing. Procedure was complicated by post procedural ptx, see below.

## 2025-03-26 NOTE — SUBJECTIVE & OBJECTIVE
"Interval History:   - Admitted yesterday afternoon following LV lead revision and implant of LBA lead with new onset L sided ptx. Chest tube placed to suction yesterday by pulmonology.  - RITO ROTHMAN, not on supplemental o2 this AM  - This AM patient feeling well, denies pain or dyspnea  - Tele V paced 60-70s    Objective:     Vital Signs (Most Recent):  Temp: 98.2 °F (36.8 °C) (03/26/25 0701)  Pulse: 76 (03/26/25 0815)  Resp: 12 (03/25/25 2039)  BP: (!) 154/78 (03/26/25 0800)  SpO2: 100 % (03/26/25 0815) Vital Signs (24h Range):  Temp:  [97.2 °F (36.2 °C)-98.2 °F (36.8 °C)] 98.2 °F (36.8 °C)  Pulse:  [60-76] 76  Resp:  [12-21] 12  SpO2:  [95 %-100 %] 100 %  BP: ()/(38-92) 154/78     Weight: 79.2 kg (174 lb 9.7 oz)  Body mass index is 22.42 kg/m².     SpO2: 100 %        Physical Exam  Constitutional:       Appearance: Normal appearance.   HENT:      Mouth/Throat:      Mouth: Mucous membranes are moist.   Eyes:      Extraocular Movements: Extraocular movements intact.      Conjunctiva/sclera: Conjunctivae normal.   Cardiovascular:      Rate and Rhythm: Normal rate and regular rhythm.      Comments: PPM site without hematoma or induration  Pulmonary:      Effort: Pulmonary effort is normal.      Comments: L sided chest tube in place  Abdominal:      General: Abdomen is flat.      Palpations: Abdomen is soft.   Skin:     General: Skin is warm and dry.   Neurological:      General: No focal deficit present.      Mental Status: He is oriented to person, place, and time.            Significant Labs: CMP: No results for input(s): "NA", "K", "CL", "CO2", "GLU", "BUN", "CREATININE", "CALCIUM", "PROT", "ALBUMIN", "BILITOT", "ALKPHOS", "AST", "ALT", "ANIONGAP", "ESTGFRAFRICA", "EGFRNONAA" in the last 48 hours., CBC: No results for input(s): "WBC", "HGB", "HCT", "PLT" in the last 48 hours., and INR: No results for input(s): "INR", "PROTIME" in the last 48 hours.    Significant Imaging: X-Ray: CXR: X-Ray Chest 1 View (CXR): "   Results for orders placed or performed during the hospital encounter of 03/25/25   X-Ray Chest 1 View    Narrative    EXAMINATION:  XR CHEST 1 VIEW    CLINICAL HISTORY:  chest tube in place for pneumothorax;    TECHNIQUE:  Single frontal view of the chest was performed.    COMPARISON:  03/25/2025    FINDINGS:  Left pleural drainage catheter has been placed with near complete re-expansion of the left hemithorax.  Small left apical pneumothorax remains.  No detrimental change in the cardiopulmonary status otherwise.      Impression    As above      Electronically signed by: Vernon aLnier MD  Date:    03/25/2025  Time:    19:38

## 2025-03-26 NOTE — SUBJECTIVE & OBJECTIVE
Past Medical History:   Diagnosis Date    2:1 Second degree AV block 03/05/2013    Cardiac pacemaker 03/06/2013    [facility]    Heart block 03/06/2013    S/p DC PM    Hyperlipidemia     Personal history of colonic polyps 12/30/2013    Normal 12/13 ---Repeat colonoscopy in 10 years for screening purposes. (Previous polyps were both hyperplastic)       Past Surgical History:   Procedure Laterality Date    COLONOSCOPY N/A 1/22/2024    Procedure: COLONOSCOPY;  Surgeon: Israel Hartley MD;  Location: [facility] ENDO (4TH FLR);  Service: Endoscopy;  Laterality: N/A;  Referred by Dr. Gonzalez, pt requesting Dr. Hartley, [facility], PEG, portal -ml  1/16- precall complete - ERW    EXCISIONAL HEMORRHOIDECTOMY N/A 6/7/2022    Procedure: HEMORRHOIDECTOMY;  Surgeon: Israel Hartley MD;  Location: [facility] OR 2ND FLR;  Service: Colon and Rectal;  Laterality: N/A;    HERNIA REPAIR      IMPLANTATION OF BIVENTRICULAR PERMANENT PACEMAKER AS UPGRADE TO EXISTING PACEMAKER N/A 10/14/2019    Procedure: Biventricular pacemaker upgrade;  Surgeon: Jarod Hanson MD;  Location: [facility] EP LAB;  Service: Cardiology;  Laterality: N/A;  NICM, CRT-P upgrade, ANDREW, MAC, MB, 3 Prep *SJM PPM in situ*    inguinal hernia repair      x 2    INSERT / REPLACE / REMOVE PACEMAKER  03/06/2013    S/p DC PM/ replace w/Biventricular pacemaker upgrade 10/14/19    SHOULDER SURGERY Left        Review of patient's allergies indicates:  No Known Allergies    Current Facility-Administered Medications on File Prior to Encounter   Medication    0.9%  NaCl infusion    0.9%  NaCl infusion    mupirocin 2 % ointment     Current Outpatient Medications on File Prior to Encounter   Medication Sig    CALCIUM POLYCARBOPHIL (KONSYL FIBER ORAL) Take by mouth.    glucosamine-chondroitin 500-400 mg tablet Take 1 tablet by mouth 3 (three) times daily.    multivitamin capsule Take 1 capsule by mouth once daily.    simvastatin (ZOCOR) 40 MG tablet Take 1 tablet by  mouth once daily    LORazepam (ATIVAN) 0.5 MG tablet TAKE 1 TABLET BY MOUTH EVERY 6 HOURS AS NEEDED FOR ANXIETY     Family History       Problem Relation (Age of Onset)    Cancer Mother, Father    Diabetes Mother          Tobacco Use    Smoking status: Former     Types: Cigars     Quit date: 1973     Years since quittin.7     Passive exposure: Past    Smokeless tobacco: Never    Tobacco comments:     Patient Quit Smoking Cigars on 1973.   Substance and Sexual Activity    Alcohol use: Yes     Alcohol/week: 7.0 standard drinks of alcohol     Types: 5 Glasses of wine, 2 Cans of beer per week     Comment: Daily 1-2    Drug use: No    Sexual activity: Yes     Partners: Female     Review of Systems   All other systems reviewed and are negative.    Objective:     Vital Signs (Most Recent):  Temp: 97.8 °F (36.6 °C) (25 183)  Pulse: 76 (25)  Resp: 16 (25 1745)  BP: (!) 151/81 (25 1847)  SpO2: 98 % (25) Vital Signs (24h Range):  Temp:  [97.2 °F (36.2 °C)-98.4 °F (36.9 °C)] 97.8 °F (36.6 °C)  Pulse:  [60-76] 76  Resp:  [14-21] 16  SpO2:  [95 %-100 %] 98 %  BP: ()/(38-92) 151/81     Weight: 79.2 kg (174 lb 9.7 oz)  Body mass index is 22.42 kg/m².    SpO2: 98 %         Intake/Output Summary (Last 24 hours) at 3/25/2025 2035  Last data filed at 3/25/2025 1849  Gross per 24 hour   Intake 800 ml   Output 490 ml   Net 310 ml       Lines/Drains/Airways       Drain  Duration                  Chest Tube 25 1645 Left <1 day              Peripheral Intravenous Line  Duration                  Peripheral IV - Single Lumen 25 0814 20 G Left;Posterior Hand <1 day         Peripheral IV - Single Lumen 25 0827 20 G Anterior;Right Forearm <1 day                     Physical Exam  HENT:      Head: Normocephalic.      Mouth/Throat:      Mouth: Mucous membranes are moist.   Eyes:      Pupils: Pupils are equal, round, and reactive to light.   Cardiovascular:      Rate  and Rhythm: Normal rate and regular rhythm.      Pulses:           Radial pulses are 2+ on the right side and 2+ on the left side.        Dorsalis pedis pulses are 2+ on the right side and 2+ on the left side.        Posterior tibial pulses are 2+ on the right side and 2+ on the left side.      Heart sounds: Normal heart sounds, S1 normal and S2 normal.   Pulmonary:      Effort: Pulmonary effort is normal.   Chest:      Comments: Chest tube connected to suction   Musculoskeletal:         General: Normal range of motion.      Right lower leg: No edema.      Left lower leg: No edema.   Skin:     General: Skin is warm.      Capillary Refill: Capillary refill takes less than 2 seconds.   Neurological:      Mental Status: He is alert.          Significant Labs:   Recent Lab Results         03/25/25  1236   03/25/25  0841        QRS Duration 160   176       [facility] QTC Calculation 507   513

## 2025-03-26 NOTE — ASSESSMENT & PLAN NOTE
S/p lead exchange with EP the patient was found to have a small left sided apical pneumothorax. Upon evaluation by the Pulm team the patient was not in respiratory distress, HR, BP stable, and asymptomatic otherwise. Recovering well post-procedure.    Chest tube placed in the PACU yesterday. On evaluation today 3/26 continues to have intermittent air leak to chest tube.     Plan:   -- Keep chest tube to suction   -- Pulmonary will continue to follow

## 2025-03-26 NOTE — SUBJECTIVE & OBJECTIVE
Interval History: NAEON      Objective:     Vital Signs (Most Recent):  Temp: 98.4 °F (36.9 °C) (03/26/25 1100)  Pulse: 61 (03/26/25 1100)  Resp: 12 (03/25/25 2039)  BP: (!) 147/77 (03/26/25 1100)  SpO2: 96 % (03/26/25 1100) Vital Signs (24h Range):  Temp:  [97.2 °F (36.2 °C)-98.4 °F (36.9 °C)] 98.4 °F (36.9 °C)  Pulse:  [60-76] 61  Resp:  [12-21] 12  SpO2:  [95 %-100 %] 96 %  BP: ()/(38-92) 147/77     Weight: 79.2 kg (174 lb 9.7 oz)  Body mass index is 22.42 kg/m².     SpO2: 96 %         Intake/Output Summary (Last 24 hours) at 3/26/2025 1116  Last data filed at 3/26/2025 1100  Gross per 24 hour   Intake 860 ml   Output 1763 ml   Net -903 ml       Lines/Drains/Airways       Drain  Duration                  Chest Tube 03/25/25 1645 Left <1 day              Peripheral Intravenous Line  Duration                  Peripheral IV - Single Lumen 03/25/25 0814 20 G Left;Posterior Hand 1 day         Peripheral IV - Single Lumen 03/25/25 0827 20 G Anterior;Right Forearm 1 day                       Physical Exam  Constitutional:       Appearance: Normal appearance.   HENT:      Mouth/Throat:      Mouth: Mucous membranes are moist.   Eyes:      Extraocular Movements: Extraocular movements intact.      Conjunctiva/sclera: Conjunctivae normal.   Cardiovascular:      Rate and Rhythm: Normal rate and regular rhythm.      Comments: PPM site without hematoma or induration  Pulmonary:      Effort: Pulmonary effort is normal.      Comments: L sided chest tube in place  Abdominal:      General: Abdomen is flat.      Palpations: Abdomen is soft.   Skin:     General: Skin is warm and dry.   Neurological:      General: No focal deficit present.      Mental Status: He is oriented to person, place, and time.            Significant Labs: All pertinent lab results from the last 24 hours have been reviewed.    Significant Imaging: X-Ray: CXR: X-Ray Chest 1 View (CXR):   Results for orders placed or performed during the hospital encounter  of 03/25/25   X-Ray Chest 1 View    Narrative    EXAMINATION:  XR CHEST 1 VIEW    CLINICAL HISTORY:  chest tube in place for pneumothorax;    TECHNIQUE:  Single frontal view of the chest was performed.    COMPARISON:  03/25/2025    FINDINGS:  Left pleural drainage catheter has been placed with near complete re-expansion of the left hemithorax.  Small left apical pneumothorax remains.  No detrimental change in the cardiopulmonary status otherwise.      Impression    As above      Electronically signed by: Vernon Lanier MD  Date:    03/25/2025  Time:    19:38

## 2025-03-26 NOTE — SUBJECTIVE & OBJECTIVE
Interval History: Chest tube placed in the PACU yesterday. During the placement the patient became altered, diaphoretic, and MAP dropped to 50 w/ systolic's in the 60s. Recovered hemodynamically after the chest tube placed. This morning the chest tube continues to have an air leak although the CXR shows full re-expansion of the lung with minimal remaining pneumothorax. Plan to monitor for one day before considering discussing with CTS and imaging further.       Objective:     Vital Signs (Most Recent):  Temp: 98.2 °F (36.8 °C) (03/26/25 0701)  Pulse: 74 (03/26/25 0901)  Resp: 12 (03/25/25 2039)  BP: (!) 158/77 (03/26/25 0900)  SpO2: 97 % (03/26/25 0901) Vital Signs (24h Range):  Temp:  [97.2 °F (36.2 °C)-98.2 °F (36.8 °C)] 98.2 °F (36.8 °C)  Pulse:  [60-76] 74  Resp:  [12-21] 12  SpO2:  [95 %-100 %] 97 %  BP: ()/(38-92) 158/77     Weight: 79.2 kg (174 lb 9.7 oz)  Body mass index is 22.42 kg/m².      Intake/Output Summary (Last 24 hours) at 3/26/2025 1014  Last data filed at 3/26/2025 0800  Gross per 24 hour   Intake 860 ml   Output 1513 ml   Net -653 ml        Physical Exam  Constitutional:       Appearance: Normal appearance.   HENT:      Mouth/Throat:      Mouth: Mucous membranes are moist.   Eyes:      Extraocular Movements: Extraocular movements intact.      Conjunctiva/sclera: Conjunctivae normal.   Cardiovascular:      Rate and Rhythm: Normal rate and regular rhythm.      Comments: PPM site without hematoma or induration  Pulmonary:      Effort: Pulmonary effort is normal.      Comments: L sided chest tube in place,   Abdominal:      General: Abdomen is flat.      Palpations: Abdomen is soft.   Skin:     General: Skin is warm and dry.   Neurological:      General: No focal deficit present.      Mental Status: He is oriented to person, place, and time.          Vents:       Lines/Drains/Airways       Drain  Duration                  Chest Tube 03/25/25 1645 Left <1 day              Peripheral Intravenous  Line  Duration                  Peripheral IV - Single Lumen 03/25/25 0814 20 G Left;Posterior Hand 1 day         Peripheral IV - Single Lumen 03/25/25 0827 20 G Anterior;Right Forearm 1 day                    Significant Labs:    All pertinent labs within the past 24 hours have been reviewed.    Significant Imaging:  I have reviewed all pertinent imaging results/findings within the past 24 hours.

## 2025-03-27 PROCEDURE — 20000000 HC ICU ROOM

## 2025-03-27 PROCEDURE — 94761 N-INVAS EAR/PLS OXIMETRY MLT: CPT

## 2025-03-27 PROCEDURE — 99233 SBSQ HOSP IP/OBS HIGH 50: CPT | Mod: ,,, | Performed by: INTERNAL MEDICINE

## 2025-03-27 PROCEDURE — 63600175 PHARM REV CODE 636 W HCPCS: Performed by: STUDENT IN AN ORGANIZED HEALTH CARE EDUCATION/TRAINING PROGRAM

## 2025-03-27 PROCEDURE — 25000003 PHARM REV CODE 250: Performed by: STUDENT IN AN ORGANIZED HEALTH CARE EDUCATION/TRAINING PROGRAM

## 2025-03-27 PROCEDURE — 99232 SBSQ HOSP IP/OBS MODERATE 35: CPT | Mod: GC,,, | Performed by: INTERNAL MEDICINE

## 2025-03-27 RX ORDER — LORAZEPAM 2 MG/ML
2 INJECTION INTRAMUSCULAR
Status: COMPLETED | OUTPATIENT
Start: 2025-03-27 | End: 2025-03-27

## 2025-03-27 RX ADMIN — DOXYCYCLINE HYCLATE 100 MG: 100 TABLET, COATED ORAL at 08:03

## 2025-03-27 RX ADMIN — LORAZEPAM 2 MG: 2 INJECTION INTRAMUSCULAR; INTRAVENOUS at 10:03

## 2025-03-27 NOTE — ASSESSMENT & PLAN NOTE
S/p lead exchange with EP the patient was found to have a small left sided apical pneumothorax. Upon evaluation by the Pulm team the patient was not in respiratory distress, HR, BP stable, and asymptomatic otherwise. Recovering well post-procedure.    Chest tube placed in the PACU 3/25. 3/27 continues to have an air leak of around the same level as yesterday. Was sitting up in chair.       Plan:   -- Keep chest tube to suction   -- CT chest w/o   -- Consult to CTS for surgical opinion.

## 2025-03-27 NOTE — SUBJECTIVE & OBJECTIVE
Interval History: Continues to have an air leak worsened with cough that is around the same level as it was yesterday. Non con ct + CTS opinion.       Objective:     Vital Signs (Most Recent):  Temp: 98 °F (36.7 °C) (03/27/25 0701)  Pulse: 72 (03/27/25 0901)  Resp: 18 (03/27/25 0801)  BP: (!) 159/75 (03/27/25 0801)  SpO2: 96 % (03/27/25 0945) Vital Signs (24h Range):  Temp:  [98 °F (36.7 °C)-98.7 °F (37.1 °C)] 98 °F (36.7 °C)  Pulse:  [60-84] 72  Resp:  [12-18] 18  SpO2:  [94 %-97 %] 96 %  BP: (132-179)/(63-84) 159/75     Weight: 79.2 kg (174 lb 9.7 oz)  Body mass index is 22.42 kg/m².      Intake/Output Summary (Last 24 hours) at 3/27/2025 1056  Last data filed at 3/27/2025 1001  Gross per 24 hour   Intake 120 ml   Output 2200 ml   Net -2080 ml        Physical Exam  Constitutional:       Appearance: Normal appearance.   HENT:      Mouth/Throat:      Mouth: Mucous membranes are moist.   Eyes:      Extraocular Movements: Extraocular movements intact.      Conjunctiva/sclera: Conjunctivae normal.   Cardiovascular:      Rate and Rhythm: Normal rate and regular rhythm.      Comments: PPM site without hematoma or induration  Pulmonary:      Effort: Pulmonary effort is normal.      Comments: L sided chest tube in place,   Abdominal:      General: Abdomen is flat.      Palpations: Abdomen is soft.   Skin:     General: Skin is warm and dry.   Neurological:      General: No focal deficit present.      Mental Status: He is oriented to person, place, and time.             Vents:       Lines/Drains/Airways       Drain  Duration                  Chest Tube 03/25/25 1645 Left 1 day              Peripheral Intravenous Line  Duration                  Peripheral IV - Single Lumen 03/25/25 0814 20 G Left;Posterior Hand 2 days         Peripheral IV - Single Lumen 03/25/25 0827 20 G Anterior;Right Forearm 2 days                    Significant Labs:    CBC/Anemia Profile:  Recent Labs   Lab 03/26/25  0834   WBC 7.61   HGB 14.5   HCT  44.1      MCV 91   RDW 13.5        Chemistries:  Recent Labs   Lab 03/26/25  0834      K 3.8      CO2 24   BUN 10   CREATININE 0.7   CALCIUM 8.8   ALBUMIN 3.4*   BILITOT 0.8   ALKPHOS 77   ALT 18   AST 21   GLUCOSE 115*       All pertinent labs within the past 24 hours have been reviewed.    Significant Imaging:  I have reviewed all pertinent imaging results/findings within the past 24 hours.

## 2025-03-27 NOTE — PROGRESS NOTES
[facility] - Cardiac Intensive Care  Cardiology  Progress Note    Patient Name: Marcos Elaine  MRN: [id number]  Admission Date: 3/25/2025  Hospital Length of Stay: 2 days  Code Status: Full Code   Attending Physician: Jarod Hanson MD   Primary Care Physician: Hiren Gonzalez MD  Expected Discharge Date: 3/30/2025  Principal Problem:Presence of cardiac resynchronization therapy pacemaker (CRT-P)    Subjective:     Hospital Course:   Chest tube placed in the PACU yesterday. The chest tube continues to have air leak with pulmonology following. Getting a CT chest to evaluate for underlying pulmonary emphysema (slow healing from PTX), as well as getting thoracic surgery involved to evaluate for pleurodesis.     Interval History: No acute changes. Chest tube with leak to water seal. Getting CTS involved per pulm recs.       Objective:     Vital Signs (Most Recent):  Temp: 97.8 °F (36.6 °C) (03/27/25 1125)  Pulse: 88 (03/27/25 1301)  Resp: 18 (03/27/25 1125)  BP: 137/64 (03/27/25 1301)  SpO2: 96 % (03/27/25 1301) Vital Signs (24h Range):  Temp:  [97.8 °F (36.6 °C)-98.7 °F (37.1 °C)] 97.8 °F (36.6 °C)  Pulse:  [60-88] 88  Resp:  [12-20] 18  SpO2:  [94 %-97 %] 96 %  BP: (132-179)/(63-84) 137/64     Weight: 79.2 kg (174 lb 9.7 oz)  Body mass index is 22.42 kg/m².     SpO2: 96 %         Intake/Output Summary (Last 24 hours) at 3/27/2025 1351  Last data filed at 3/27/2025 1145  Gross per 24 hour   Intake 120 ml   Output 1600 ml   Net -1480 ml       Lines/Drains/Airways       Drain  Duration                  Chest Tube 03/25/25 1645 Left 1 day              Peripheral Intravenous Line  Duration                  Peripheral IV - Single Lumen 03/25/25 0814 20 G Left;Posterior Hand 2 days         Peripheral IV - Single Lumen 03/25/25 0827 20 G Anterior;Right Forearm 2 days                       Physical Exam  Constitutional:       Appearance: Normal appearance.   HENT:      Mouth/Throat:      Mouth: Mucous membranes are  moist.   Eyes:      Extraocular Movements: Extraocular movements intact.      Conjunctiva/sclera: Conjunctivae normal.   Cardiovascular:      Rate and Rhythm: Normal rate and regular rhythm.      Comments: PPM site without hematoma or induration  Pulmonary:      Effort: Pulmonary effort is normal.      Comments: L sided chest tube in place  Abdominal:      General: Abdomen is flat.      Palpations: Abdomen is soft.   Skin:     General: Skin is warm and dry.   Neurological:      General: No focal deficit present.      Mental Status: He is oriented to person, place, and time.            Significant Labs: All pertinent lab results from the last 24 hours have been reviewed.    Significant Imaging: X-Ray: CXR: X-Ray Chest 1 View (CXR):   Results for orders placed or performed during the hospital encounter of 03/25/25   X-Ray Chest 1 View    Narrative    EXAMINATION:  XR CHEST 1 VIEW    CLINICAL HISTORY:  chest tube in place for pneumothorax;    TECHNIQUE:  Single frontal view of the chest was performed.    COMPARISON:  03/25/2025    FINDINGS:  Left pleural drainage catheter has been placed with near complete re-expansion of the left hemithorax.  Small left apical pneumothorax remains.  No detrimental change in the cardiopulmonary status otherwise.      Impression    As above      Electronically signed by: Vernon Lanier MD  Date:    03/25/2025  Time:    19:38       Assessment and Plan:       * Presence of cardiac resynchronization therapy pacemaker (CRT-P)  No issues pacing  No concerns from a cardiac standpoint    Pneumothorax, postprocedural  Complication after CRT-P LV lead.   CXR after procedure complicated by apical pneumothorax  S/p Thoracentesis by pulm   Admitted to CICU for monitoring   Plan:   - CXR in the morning   - Pulm following   - Chest tube connected to suction   - CT chest non con today  - Thoracic surgery consult- eval for pleurodesis    Appreciate pulm and throacic surgery's help managing this  patient.        VTE Risk Mitigation (From admission, onward)      None            Connor M Gillies, DO  Cardiology  [facility] - Card[facility]

## 2025-03-27 NOTE — PLAN OF CARE
[facility] - Cardiac Intensive [facility]  Initial Discharge Assessment       Primary Care Provider: Hiren Gonzalez MD    Admission Diagnosis: CHB (complete heart block) [I44.2]  AICD lead malfunction [T82.110A]  Presence of cardiac resynchronization therapy pacemaker (CRT-P) [Z95.0]    Admission Date: 3/25/2025  Expected Discharge Date: 3/30/2025    Transition of Care Barriers: None    Payor: MEDICARE / Plan: MEDICARE PART A & B / Product Type: Government /     Extended Emergency Contact Information  Primary Emergency Contact: Samuel Elaine  Address: [address] [address] [facility]  Home Phone: [phone]  Mobile Phone: [phone]  Relation: Spouse    Discharge Plan A: Home with family  Discharge Plan B: Home Health      [facility] [place] - [address]  [address]  [place] [address]  Phone: [phone] Fax: [phone]      Initial Assessment (most recent)       Adult Discharge Assessment - 03/27/25 1531          Discharge Assessment    Assessment Type Discharge Planning Assessment     Confirmed/corrected address, phone number and insurance Yes     Source of Information patient;family     Communicated STEPHANIE with patient/caregiver Yes     Reason For Admission CRT-P     People in Home spouse;child(cadence), adult     Facility Arrived From: Home     Do you expect to return to your current living situation? Yes     Do you have help at home or someone to help you manage your care at home? Yes     Who are your caregiver(s) and their phone number(s)? Samuel Elaine (spouse) [phone]     Prior to hospitilization cognitive status: Alert/Oriented     Current cognitive status: Alert/Oriented     Walking or Climbing Stairs Difficulty no     Dressing/Bathing Difficulty no     Home Layout Able to live on 1st floor     Equipment Currently Used at Home none     Readmission within 30 days? No     Patient currently being followed by outpatient case management? No      Do you currently have service(s) that help you manage your care at home? No     Do you take prescription medications? Yes     Do you have prescription coverage? Yes     Do you have any problems affording any of your prescribed medications? No     Is the patient taking medications as prescribed? yes     Who is going to help you get home at discharge? Samuel Elaine (spouse) [phone]     How do you get to doctors appointments? car, drives self     Are you on dialysis? No     Do you take coumadin? No     Discharge Plan A Home with family     Discharge Plan B Home Health     DME Needed Upon Discharge  none     Discharge Plan discussed with: Patient;Spouse/sig other     Name(s) and Number(s) Samuel Elaine (spouse) [phone]     Transition of Care Barriers None                   SW met with pt and wife Samuel at bedside to discuss discharge planning.  Pt lives with Samuel and an adult son in a one-story house and in independent with ambulation and ADLs.  No DME, HH, dialysis, or coumadin.  PCP is Dr Goznalez.  Pt will have transportation and assistance from Samuel at discharge.  Discharge Plan A and Plan B have been determined by review of patient's clinical status, future medical and therapeutic needs, and coverage/benefits for post-acute care in coordination with multidisciplinary team members.  SW name and ext on whiteboard; discharge planning booklet provided.  Will continue to follow.      Sara Fournier LMSW  [facility]  [phone]

## 2025-03-27 NOTE — SUBJECTIVE & OBJECTIVE
Interval History: No acute changes. Chest tube with leak to water seal. Getting CTS involved per pulm recs.       Objective:     Vital Signs (Most Recent):  Temp: 97.8 °F (36.6 °C) (03/27/25 1125)  Pulse: 88 (03/27/25 1301)  Resp: 18 (03/27/25 1125)  BP: 137/64 (03/27/25 1301)  SpO2: 96 % (03/27/25 1301) Vital Signs (24h Range):  Temp:  [97.8 °F (36.6 °C)-98.7 °F (37.1 °C)] 97.8 °F (36.6 °C)  Pulse:  [60-88] 88  Resp:  [12-20] 18  SpO2:  [94 %-97 %] 96 %  BP: (132-179)/(63-84) 137/64     Weight: 79.2 kg (174 lb 9.7 oz)  Body mass index is 22.42 kg/m².     SpO2: 96 %         Intake/Output Summary (Last 24 hours) at 3/27/2025 1351  Last data filed at 3/27/2025 1145  Gross per 24 hour   Intake 120 ml   Output 1600 ml   Net -1480 ml       Lines/Drains/Airways       Drain  Duration                  Chest Tube 03/25/25 1645 Left 1 day              Peripheral Intravenous Line  Duration                  Peripheral IV - Single Lumen 03/25/25 0814 20 G Left;Posterior Hand 2 days         Peripheral IV - Single Lumen 03/25/25 0827 20 G Anterior;Right Forearm 2 days                       Physical Exam  Constitutional:       Appearance: Normal appearance.   HENT:      Mouth/Throat:      Mouth: Mucous membranes are moist.   Eyes:      Extraocular Movements: Extraocular movements intact.      Conjunctiva/sclera: Conjunctivae normal.   Cardiovascular:      Rate and Rhythm: Normal rate and regular rhythm.      Comments: PPM site without hematoma or induration  Pulmonary:      Effort: Pulmonary effort is normal.      Comments: L sided chest tube in place  Abdominal:      General: Abdomen is flat.      Palpations: Abdomen is soft.   Skin:     General: Skin is warm and dry.   Neurological:      General: No focal deficit present.      Mental Status: He is oriented to person, place, and time.            Significant Labs: All pertinent lab results from the last 24 hours have been reviewed.    Significant Imaging: X-Ray: CXR: X-Ray Chest 1  View (CXR):   Results for orders placed or performed during the hospital encounter of 03/25/25   X-Ray Chest 1 View    Narrative    EXAMINATION:  XR CHEST 1 VIEW    CLINICAL HISTORY:  chest tube in place for pneumothorax;    TECHNIQUE:  Single frontal view of the chest was performed.    COMPARISON:  03/25/2025    FINDINGS:  Left pleural drainage catheter has been placed with near complete re-expansion of the left hemithorax.  Small left apical pneumothorax remains.  No detrimental change in the cardiopulmonary status otherwise.      Impression    As above      Electronically signed by: Vernon Lanier MD  Date:    03/25/2025  Time:    19:38

## 2025-03-27 NOTE — HPI
69M with complete heart block admitted after lead exchange as he developed a PTX and a chest tube was place. He has had an air leak for 2 days and  Thoracic consulted for persistent leak. His lung has expanded and he does have a tiny residual PTX. A CT was obtained and unremarkable. He has no other medical problem. He has never had chest surgery before or prior PTX. No blood thinners. Ambulates without issue. Currently in ICU for monitoring but stable on RA doing well.

## 2025-03-27 NOTE — NURSING
CICU DAILY GOALS       A: Awake    RASS: Goal -    Actual - RASS (Garcia Agitation-Sedation Scale): alert and calm   Restraint necessity:    B: Breath   SBT: Not intubated   C: Coordinate A & B, analgesics/sedatives   Pain: managed    SAT: Not intubated  D: Delirium   CAM-ICU:    E: Early(intubated/ Progressive (non-intubated) Mobility   MOVE Screen: Pass   Activity: Activity Management: Up in chair - L3  FAS: Feeding/Nutrition   Diet order: Diet/Nutrition Received: clear liquid,   Fluid restriction:     Nutritional Supplement Intake: patient was advanced from clear liquid to a cardiac diet.   T: Thrombus   DVT prophylaxis: VTE Core Measure: Pharmacological prophylaxis initiated/maintained  H: HOB Elevation   Head of Bed (HOB) Positioning: HOB at 30-45 degrees  U: Ulcer Prophylaxis   GI: no  G: Glucose control   managed    S: Skin   Bathing/Skin Care: dressed/undressed;bath, complete;linen changed (03/26/25 1101)  Wounds: No  Wound care consulted: No  B: Bowel Function   no issues   I: Indwelling Catheters   Gamino necessity:     CVC necessity: No  D: De-escalation Antibx   No  Plan for the day    Family/Goals of care/Code Status   Code Status: Full Code    Not trending cvp or svo2. Patient stable with no complaints of pain or discomfort. No complaints of shortness of breath. Bubbling noted in chest tube; Lehman MD, with pulm team. CT scan completed. CTS consulted and have seen the pan. No acute events throughout day, VS and assessment per flow sheet, patient progressing towards goals as tolerated, plan of care reviewed with Marcos Elaine and family, all concerns addressed.

## 2025-03-27 NOTE — CARE UPDATE
EP Care Update    Patient s/p LV lead revision and implant of LBA lead placement 03/25. Procedure complicated by ptx requiring L sided chest tube placement by Pulmonology. Cxr following chest tube placmeent improved, though with presence of persistent air leak.     CT chest performed, read pending. CTS consulted for surgical options as recommended by Pulmonology.     EP will continue to follow.     DERICK Martinez  Cardiovascular Disease fellow, PGY-4  [facility]

## 2025-03-27 NOTE — ASSESSMENT & PLAN NOTE
69M with complete heart block who had pacemaker who developed a PTX after lead exchange. He has a air leak but it was difficult to elicit . Likely a small leak.     -Not an impressive leak. At this point we recommend continued conservative management to see if we can have this leak seal without further intervention.   -Would keep the CT to suction and see if we can get this leak to resolved over the next few days   -Should it not resolve or become worse we will consider other options  -Please call with questions

## 2025-03-27 NOTE — CONSULTS
[facility] - Cardiac [facility]ensive [facility]  Thoracic Surgery  Consult Note    Patient Name: Marcos Elaine  MRN: [id number]  Code Status: Full Code  Admission Date: 3/25/2025  Hospital Length of Stay: 2 days  Consult Requesting Physician: Valentin  Consulting Physician: Scar  Primary Care Provider: Hiren Gonzalez MD    Inpatient consult to Cardiothoracic Surgery  Consult performed by: Rios Acuna MD  Consult ordered by: Gillies, Connor M, DO        Subjective:     Reason for Consult: Air leak with PTX    History of Present Illness: 69M with complete heart block admitted after lead exchange as he developed a PTX and a chest tube was place. He has had an air leak for 2 days and  Thoracic consulted for persistent leak. His lung has expanded and he does have a tiny residual PTX. A CT was obtained and unremarkable. He has no other medical problem. He has never had chest surgery before or prior PTX. No blood thinners. Ambulates without issue. Currently in ICU for monitoring but stable on RA doing well.     Current Facility-Administered Medications on File Prior to Encounter   Medication    0.9%  NaCl infusion    0.9%  NaCl infusion    mupirocin 2 % ointment     Current Outpatient Medications on File Prior to Encounter   Medication Sig    CALCIUM POLYCARBOPHIL (KONSYL FIBER ORAL) Take by mouth.    glucosamine-chondroitin 500-400 mg tablet Take 1 tablet by mouth 3 (three) times daily.    multivitamin capsule Take 1 capsule by mouth once daily.    simvastatin (ZOCOR) 40 MG tablet Take 1 tablet by mouth once daily    LORazepam (ATIVAN) 0.5 MG tablet TAKE 1 TABLET BY MOUTH EVERY 6 HOURS AS NEEDED FOR ANXIETY       Review of patient's allergies indicates:  No Known Allergies    Past Medical History:   Diagnosis Date    2:1 Second degree AV block 03/05/2013    Cardiac pacemaker 03/06/2013    [facility]    Heart block 03/06/2013    S/p DC PM    Hyperlipidemia     Personal history of colonic polyps 12/30/2013    Normal   ---Repeat colonoscopy in 10 years for screening purposes. (Previous polyps were both hyperplastic)     Past Surgical History:   Procedure Laterality Date    COLONOSCOPY N/A 2024    Procedure: COLONOSCOPY;  Surgeon: Israel Hartley MD;  Location: [facility] (4TH FLR);  Service: Endoscopy;  Laterality: N/A;  Referred by Dr. Gonzalez, pt requesting Dr. Hartley, [facility] Pacemaker, PEG, portal -ml  - precall complete - ERW    EXCISIONAL HEMORRHOIDECTOMY N/A 2022    Procedure: HEMORRHOIDECTOMY;  Surgeon: Israel Hartley MD;  Location: [facility] OR 2ND FLR;  Service: Colon and Rectal;  Laterality: N/A;    HERNIA REPAIR      IMPLANTATION OF BIVENTRICULAR PERMANENT PACEMAKER AS UPGRADE TO EXISTING PACEMAKER N/A 10/14/2019    Procedure: Biventricular pacemaker upgrade;  Surgeon: Jarod Hanson MD;  Location: [facility] EP LAB;  Service: Cardiology;  Laterality: N/A;  NICM, CRT-P upgrade, SJM, MAC, MB, 3 Prep *SJM PPM in situ*    inguinal hernia repair      x 2    INSERT / REPLACE / REMOVE PACEMAKER  2013    S/p DC PM/ replace w/Biventricular pacemaker upgrade 10/14/19    REVISION OF PROCEDURE INVOLVING PACEMAKER LEAD Left 3/25/2025    Procedure: REVISION, PACEMAKER LEAD;  Surgeon: Jarod Hanson MD;  Location: [facility] EP LAB;  Service: Cardiology;  Laterality: Left;  Ld Mlfxn, CRT-P LV ld rev, +/- LBBAP, SJM, MAC, MB, 3 prep    SHOULDER SURGERY Left      Family History       Problem Relation (Age of Onset)    Cancer Mother, Father    Diabetes Mother          Tobacco Use    Smoking status: Former     Types: Cigars     Quit date: 1973     Years since quittin.7     Passive exposure: Past    Smokeless tobacco: Never    Tobacco comments:     Patient Quit Smoking Cigars on 1973.   Substance and Sexual Activity    Alcohol use: Yes     Alcohol/week: 7.0 standard drinks of alcohol     Types: 5 Glasses of wine, 2 Cans of beer per week     Comment: Daily 1-2    Drug use: No    Sexual  activity: Yes     Partners: Female     Review of Systems   Constitutional:  Positive for activity change.   HENT:  Negative for ear pain and sore throat.    Eyes:  Negative for visual disturbance.   Respiratory:  Negative for apnea.    Cardiovascular:  Negative for chest pain.   Endocrine: Negative for cold intolerance and heat intolerance.   Genitourinary:  Negative for difficulty urinating.   Musculoskeletal:  Negative for gait problem.   Allergic/Immunologic: Negative for immunocompromised state.   Neurological:  Negative for facial asymmetry and light-headedness.   Psychiatric/Behavioral:  Negative for agitation and behavioral problems.      Objective:     Vital Signs (Most Recent):  Temp: 97.8 °F (36.6 °C) (03/27/25 1125)  Pulse: 88 (03/27/25 1301)  Resp: 20 (03/27/25 1401)  BP: 137/64 (03/27/25 1301)  SpO2: 95 % (03/27/25 1401) Vital Signs (24h Range):  Temp:  [97.8 °F (36.6 °C)-98.7 °F (37.1 °C)] 97.8 °F (36.6 °C)  Pulse:  [60-88] 88  Resp:  [12-20] 20  SpO2:  [94 %-97 %] 95 %  BP: (132-179)/(63-84) 137/64     Weight: 73.3 kg (161 lb 9.6 oz)  Body mass index is 20.75 kg/m².      Intake/Output - Last 3 Shifts         03/25 0700  03/26 0659 03/26 0700  03/27 0659 03/27 0700  03/28 0659    P.O.  180     IV Piggyback 800      Total Intake(mL/kg) 800 (10.1) 180 (2.3)     Urine (mL/kg/hr) 1513 2000 (1.1) 350 (0.5)    Stool  0     Chest Tube 0 0 0    Total Output 1513 2000 350    Net -713 -1820 -350           Stool Occurrence  0 x             SpO2: 95 %        Physical Exam  Vitals reviewed.   Constitutional:       General: He is not in acute distress.  HENT:      Head: Normocephalic and atraumatic.      Nose: Nose normal.   Eyes:      General:         Right eye: No discharge.         Left eye: No discharge.   Cardiovascular:      Rate and Rhythm: Normal rate and regular rhythm.   Pulmonary:      Effort: Pulmonary effort is normal. No respiratory distress.      Breath sounds: No stridor.      Comments: Left sided CT  with hard to elicit leak. Multiple coughs. Off suction and placed back on there was a small leak but resolved.   Musculoskeletal:         General: Normal range of motion.      Cervical back: Normal range of motion.   Skin:     General: Skin is warm and dry.      Capillary Refill: Capillary refill takes 2 to 3 seconds.   Neurological:      General: No focal deficit present.      Mental Status: He is alert and oriented to person, place, and time.   Psychiatric:         Mood and Affect: Mood normal.         Behavior: Behavior normal.            Significant Labs:  All pertinent labs from the last 24 hours have been reviewed.    Significant Diagnostics:  I have reviewed all pertinent imaging results/findings within the past 24 hours.    VTE Risk Mitigation (From admission, onward)      None            Assessment/Plan:     Pneumothorax, postprocedural  69M with complete heart block who had pacemaker who developed a PTX after lead exchange. He has a air leak but it was difficult to elicit . Likely a small leak.     -Not an impressive leak. At this point we recommend continued conservative management to see if we can have this leak seal without further intervention.   -Would keep the CT to suction and see if we can get this leak to resolved over the next few days   -Should it not resolve or become worse we will consider other options  -Please call with questions         Thank you for your consult. I will follow-up with patient. Please contact us if you have any additional questions.    Rios Acuna MD  Thoracic Surgery  [facility] - Cardiac Intensive [facility]

## 2025-03-27 NOTE — PROGRESS NOTES
"[facility] - Cardiac Intensive Care  Pulmonology  Progress Note    Patient Name: Marcos Elaine  MRN: [id number]  Admission Date: 3/25/2025  Hospital Length of Stay: 2 days  Code Status: Full Code  Attending Provider: Jarod Hanson MD  Primary Care Provider: Hiren Gonzalez MD   Principal Problem: Presence of cardiac resynchronization therapy pacemaker (CRT-P)    Subjective:     HPI:  69 yoM CHB s/p DC PM with upgrade to CRT-P 10/14/19 ([facility]) with improvement in EF. He is here today for LV lead revision and reimplant. Procedure complicated by apical pneumothorax noted on the post-procedure CXR and pulmonary consulted for assistance with management of the new finding. Hx of smoking "years ago. He otherwise denies other lung problems.     Overview/Hospital Course:  .    Interval History: Continues to have an air leak worsened with cough that is around the same level as it was yesterday. Non con ct + CTS opinion.       Objective:     Vital Signs (Most Recent):  Temp: 98 °F (36.7 °C) (03/27/25 0701)  Pulse: 72 (03/27/25 0901)  Resp: 18 (03/27/25 0801)  BP: (!) 159/75 (03/27/25 0801)  SpO2: 96 % (03/27/25 0945) Vital Signs (24h Range):  Temp:  [98 °F (36.7 °C)-98.7 °F (37.1 °C)] 98 °F (36.7 °C)  Pulse:  [60-84] 72  Resp:  [12-18] 18  SpO2:  [94 %-97 %] 96 %  BP: (132-179)/(63-84) 159/75     Weight: 79.2 kg (174 lb 9.7 oz)  Body mass index is 22.42 kg/m².      Intake/Output Summary (Last 24 hours) at 3/27/2025 1056  Last data filed at 3/27/2025 1001  Gross per 24 hour   Intake 120 ml   Output 2200 ml   Net -2080 ml        Physical Exam  Constitutional:       Appearance: Normal appearance.   HENT:      Mouth/Throat:      Mouth: Mucous membranes are moist.   Eyes:      Extraocular Movements: Extraocular movements intact.      Conjunctiva/sclera: Conjunctivae normal.   Cardiovascular:      Rate and Rhythm: Normal rate and regular rhythm.      Comments: PPM site without hematoma or induration  Pulmonary:      " Effort: Pulmonary effort is normal.      Comments: L sided chest tube in place,   Abdominal:      General: Abdomen is flat.      Palpations: Abdomen is soft.   Skin:     General: Skin is warm and dry.   Neurological:      General: No focal deficit present.      Mental Status: He is oriented to person, place, and time.             Vents:       Lines/Drains/Airways       Drain  Duration                  Chest Tube 03/25/25 1645 Left 1 day              Peripheral Intravenous Line  Duration                  Peripheral IV - Single Lumen 03/25/25 0814 20 G Left;Posterior Hand 2 days         Peripheral IV - Single Lumen 03/25/25 0827 20 G Anterior;Right Forearm 2 days                    Significant Labs:    CBC/Anemia Profile:  Recent Labs   Lab 03/26/25  0834   WBC 7.61   HGB 14.5   HCT 44.1      MCV 91   RDW 13.5        Chemistries:  Recent Labs   Lab 03/26/25  0834      K 3.8      CO2 24   BUN 10   CREATININE 0.7   CALCIUM 8.8   ALBUMIN 3.4*   BILITOT 0.8   ALKPHOS 77   ALT 18   AST 21   GLUCOSE 115*       All pertinent labs within the past 24 hours have been reviewed.    Significant Imaging:  I have reviewed all pertinent imaging results/findings within the past 24 hours.  Assessment/Plan:     Pulmonary  Pneumothorax, postprocedural  S/p lead exchange with EP the patient was found to have a small left sided apical pneumothorax. Upon evaluation by the Pulm team the patient was not in respiratory distress, HR, BP stable, and asymptomatic otherwise. Recovering well post-procedure.    Chest tube placed in the PACU 3/25. 3/27 continues to have an air leak of around the same level as yesterday. Was sitting up in chair.       Plan:   -- Keep chest tube to suction   -- CT chest w/o   -- Consult to CTS for surgical opinion.                   Rajeev Payton MD  Pulmonology  [facility]

## 2025-03-27 NOTE — SUBJECTIVE & OBJECTIVE
Current Facility-Administered Medications on File Prior to Encounter   Medication    0.9%  NaCl infusion    0.9%  NaCl infusion    mupirocin 2 % ointment     Current Outpatient Medications on File Prior to Encounter   Medication Sig    CALCIUM POLYCARBOPHIL (KONSYL FIBER ORAL) Take by mouth.    glucosamine-chondroitin 500-400 mg tablet Take 1 tablet by mouth 3 (three) times daily.    multivitamin capsule Take 1 capsule by mouth once daily.    simvastatin (ZOCOR) 40 MG tablet Take 1 tablet by mouth once daily    LORazepam (ATIVAN) 0.5 MG tablet TAKE 1 TABLET BY MOUTH EVERY 6 HOURS AS NEEDED FOR ANXIETY       Review of patient's allergies indicates:  No Known Allergies    Past Medical History:   Diagnosis Date    2:1 Second degree AV block 03/05/2013    Cardiac pacemaker 03/06/2013    [facility]    Heart block 03/06/2013    S/p DC PM    Hyperlipidemia     Personal history of colonic polyps 12/30/2013    Normal 12/13 ---Repeat colonoscopy in 10 years for screening purposes. (Previous polyps were both hyperplastic)     Past Surgical History:   Procedure Laterality Date    COLONOSCOPY N/A 1/22/2024    Procedure: COLONOSCOPY;  Surgeon: Israel Hartley MD;  Location: [facility] (4TH FLR);  Service: Endoscopy;  Laterality: N/A;  Referred by Dr. Gonzalez, pt requesting Dr. Hartley, [facility] Pacemaker, PEG, portal -ml  1/16- precall complete - ERW    EXCISIONAL HEMORRHOIDECTOMY N/A 6/7/2022    Procedure: HEMORRHOIDECTOMY;  Surgeon: Israel Hartley MD;  Location: [facility] OR 2ND FLR;  Service: Colon and Rectal;  Laterality: N/A;    HERNIA REPAIR      IMPLANTATION OF BIVENTRICULAR PERMANENT PACEMAKER AS UPGRADE TO EXISTING PACEMAKER N/A 10/14/2019    Procedure: Biventricular pacemaker upgrade;  Surgeon: Jarod Hanson MD;  Location: [facility] EP LAB;  Service: Cardiology;  Laterality: N/A;  NICM, CRT-P upgrade, ANDREW, MAC, MB, 3 Prep *SJM PPM in situ*    inguinal hernia repair      x 2    INSERT / REPLACE / REMOVE  PACEMAKER  2013    S/p DC PM/ replace w/Biventricular pacemaker upgrade 10/14/19    REVISION OF PROCEDURE INVOLVING PACEMAKER LEAD Left 3/25/2025    Procedure: REVISION, PACEMAKER LEAD;  Surgeon: Jarod Hanson MD;  Location: [facility] EP LAB;  Service: Cardiology;  Laterality: Left;  Ld Mlfxn, CRT-P LV ld rev, +/- LBBAP, SJM, MAC, MB, 3 prep    SHOULDER SURGERY Left      Family History       Problem Relation (Age of Onset)    Cancer Mother, Father    Diabetes Mother          Tobacco Use    Smoking status: Former     Types: Cigars     Quit date: 1973     Years since quittin.7     Passive exposure: Past    Smokeless tobacco: Never    Tobacco comments:     Patient Quit Smoking Cigars on 1973.   Substance and Sexual Activity    Alcohol use: Yes     Alcohol/week: 7.0 standard drinks of alcohol     Types: 5 Glasses of wine, 2 Cans of beer per week     Comment: Daily 1-2    Drug use: No    Sexual activity: Yes     Partners: Female     Review of Systems   Constitutional:  Positive for activity change.   HENT:  Negative for ear pain and sore throat.    Eyes:  Negative for visual disturbance.   Respiratory:  Negative for apnea.    Cardiovascular:  Negative for chest pain.   Endocrine: Negative for cold intolerance and heat intolerance.   Genitourinary:  Negative for difficulty urinating.   Musculoskeletal:  Negative for gait problem.   Allergic/Immunologic: Negative for immunocompromised state.   Neurological:  Negative for facial asymmetry and light-headedness.   Psychiatric/Behavioral:  Negative for agitation and behavioral problems.      Objective:     Vital Signs (Most Recent):  Temp: 97.8 °F (36.6 °C) (25 1125)  Pulse: 88 (25 1301)  Resp: 20 (25 1401)  BP: 137/64 (25 1301)  SpO2: 95 % (25 1401) Vital Signs (24h Range):  Temp:  [97.8 °F (36.6 °C)-98.7 °F (37.1 °C)] 97.8 °F (36.6 °C)  Pulse:  [60-88] 88  Resp:  [12-20] 20  SpO2:  [94 %-97 %] 95 %  BP: (132-179)/(63-84)  137/64     Weight: 73.3 kg (161 lb 9.6 oz)  Body mass index is 20.75 kg/m².      Intake/Output - Last 3 Shifts         03/25 0700  03/26 0659 03/26 0700 03/27 0659 03/27 0700  03/28 0659    P.O.  180     IV Piggyback 800      Total Intake(mL/kg) 800 (10.1) 180 (2.3)     Urine (mL/kg/hr) 1513 2000 (1.1) 350 (0.5)    Stool  0     Chest Tube 0 0 0    Total Output 1513 2000 350    Net -713 -1820 -350           Stool Occurrence  0 x             SpO2: 95 %        Physical Exam  Vitals reviewed.   Constitutional:       General: He is not in acute distress.  HENT:      Head: Normocephalic and atraumatic.      Nose: Nose normal.   Eyes:      General:         Right eye: No discharge.         Left eye: No discharge.   Cardiovascular:      Rate and Rhythm: Normal rate and regular rhythm.   Pulmonary:      Effort: Pulmonary effort is normal. No respiratory distress.      Breath sounds: No stridor.      Comments: Left sided CT with hard to elicit leak. Multiple coughs. Off suction and placed back on there was a small leak but resolved.   Musculoskeletal:         General: Normal range of motion.      Cervical back: Normal range of motion.   Skin:     General: Skin is warm and dry.      Capillary Refill: Capillary refill takes 2 to 3 seconds.   Neurological:      General: No focal deficit present.      Mental Status: He is alert and oriented to person, place, and time.   Psychiatric:         Mood and Affect: Mood normal.         Behavior: Behavior normal.            Significant Labs:  All pertinent labs from the last 24 hours have been reviewed.    Significant Diagnostics:  I have reviewed all pertinent imaging results/findings within the past 24 hours.    VTE Risk Mitigation (From admission, onward)      None

## 2025-03-27 NOTE — PLAN OF CARE
CICU DAILY GOALS       A: Awake    RASS: Goal -    Actual - RASS (Garcia Agitation-Sedation Scale): alert and calm   Restraint necessity:    B: Breath   SBT: Not intubated   C: Coordinate A & B, analgesics/sedatives   Pain: managed    SAT: Not intubated  D: Delirium   CAM-ICU:    E: Early(intubated/ Progressive (non-intubated) Mobility   MOVE Screen: Pass   Activity: Activity Management: Rolling - L1, Arm raise - L1, Leg kicks - L2  FAS: Feeding/Nutrition   Diet order: Diet/Nutrition Received: clear liquid,   Fluid restriction:    T: Thrombus   DVT prophylaxis: VTE Core Measure: Pharmacological prophylaxis initiated/maintained  H: HOB Elevation   Head of Bed (HOB) Positioning: HOB at 30 degrees  U: Ulcer Prophylaxis   GI: yes  G: Glucose control   managed    S: Skin   Bundle compliance: yes   Bathing/Skin Care: dressed/undressed, bath, complete, linen changed Date: 3/26/25  B: Bowel Function   no issues   I: Indwelling Catheters   Gamino necessity:     CVC necessity: No  D: De-escalation Antibx   No  Plan for the day   Monitor chest tube output and status. Allow for periods of uninterrupted rest.  Family/Goals of care/Code Status   Code Status: Full Code     Tidal air bubbling in chest tube chamber has decreased and almost completely resolved. No acute events overnight. VS and assessment per flow sheet, patient progressing towards goals as tolerated, plan of care reviewed with Marcos Elaine and family, all concerns addressed, will continue to monitor.        Problem: Adult Inpatient Plan of Care  Goal: Plan of Care Review  Outcome: Progressing  Goal: Patient-Specific Goal (Individualized)  Outcome: Progressing  Goal: Absence of Hospital-Acquired Illness or Injury  Outcome: Progressing  Goal: Optimal Comfort and Wellbeing  Outcome: Progressing  Goal: Readiness for Transition of Care  Outcome: Progressing

## 2025-03-27 NOTE — ASSESSMENT & PLAN NOTE
Complication after CRT-P LV lead.   CXR after procedure complicated by apical pneumothorax  S/p Thoracentesis by pulm   Admitted to CICU for monitoring   Plan:   - CXR in the morning   - Pulm following   - Chest tube connected to suction   - CT chest non con today  - Thoracic surgery consult- eval for pleurodesis    Appreciate pulm and throacic surgery's help managing this patient.

## 2025-03-28 LAB
ABSOLUTE EOSINOPHIL (OHS): 0.13 K/UL
ABSOLUTE MONOCYTE (OHS): 0.61 K/UL (ref 0.3–1)
ABSOLUTE NEUTROPHIL COUNT (OHS): 4.64 K/UL (ref 1.8–7.7)
ANION GAP (OHS): 6 MMOL/L (ref 8–16)
BASOPHILS # BLD AUTO: 0.03 K/UL
BASOPHILS NFR BLD AUTO: 0.4 %
BUN SERPL-MCNC: 14 MG/DL (ref 8–23)
CALCIUM SERPL-MCNC: 8.8 MG/DL (ref 8.7–10.5)
CHLORIDE SERPL-SCNC: 108 MMOL/L (ref 95–110)
CO2 SERPL-SCNC: 25 MMOL/L (ref 23–29)
CREAT SERPL-MCNC: 0.7 MG/DL (ref 0.5–1.4)
ERYTHROCYTE [DISTWIDTH] IN BLOOD BY AUTOMATED COUNT: 13.2 % (ref 11.5–14.5)
GFR SERPLBLD CREATININE-BSD FMLA CKD-EPI: >60 ML/MIN/1.73/M2
GLUCOSE SERPL-MCNC: 97 MG/DL (ref 70–110)
HCT VFR BLD AUTO: 42.9 % (ref 40–54)
HGB BLD-MCNC: 14.3 GM/DL (ref 14–18)
IMM GRANULOCYTES # BLD AUTO: 0.02 K/UL (ref 0–0.04)
IMM GRANULOCYTES NFR BLD AUTO: 0.3 % (ref 0–0.5)
LYMPHOCYTES # BLD AUTO: 1.31 K/UL (ref 1–4.8)
MCH RBC QN AUTO: 29.8 PG (ref 27–50)
MCHC RBC AUTO-ENTMCNC: 33.3 G/DL (ref 32–36)
MCV RBC AUTO: 89 FL (ref 82–98)
NUCLEATED RBC (/100WBC) (OHS): 0 /100 WBC
PLATELET # BLD AUTO: 204 K/UL (ref 150–450)
PMV BLD AUTO: 11.2 FL (ref 9.2–12.9)
POTASSIUM SERPL-SCNC: 3.8 MMOL/L (ref 3.5–5.1)
RBC # BLD AUTO: 4.8 M/UL (ref 4.6–6.2)
RELATIVE EOSINOPHIL (OHS): 1.9 %
RELATIVE LYMPHOCYTE (OHS): 19.4 % (ref 18–48)
RELATIVE MONOCYTE (OHS): 9.1 % (ref 4–15)
RELATIVE NEUTROPHIL (OHS): 68.9 % (ref 38–73)
SODIUM SERPL-SCNC: 139 MMOL/L (ref 136–145)
WBC # BLD AUTO: 6.74 K/UL (ref 3.9–12.7)

## 2025-03-28 PROCEDURE — 85025 COMPLETE CBC W/AUTO DIFF WBC: CPT

## 2025-03-28 PROCEDURE — 99233 SBSQ HOSP IP/OBS HIGH 50: CPT | Mod: GC,,, | Performed by: INTERNAL MEDICINE

## 2025-03-28 PROCEDURE — 25000003 PHARM REV CODE 250: Performed by: INTERNAL MEDICINE

## 2025-03-28 PROCEDURE — 25000003 PHARM REV CODE 250

## 2025-03-28 PROCEDURE — 99232 SBSQ HOSP IP/OBS MODERATE 35: CPT | Mod: GC,,, | Performed by: INTERNAL MEDICINE

## 2025-03-28 PROCEDURE — 94761 N-INVAS EAR/PLS OXIMETRY MLT: CPT

## 2025-03-28 PROCEDURE — 80048 BASIC METABOLIC PNL TOTAL CA: CPT

## 2025-03-28 PROCEDURE — 20000000 HC ICU ROOM

## 2025-03-28 RX ORDER — DOXYCYCLINE 100 MG/1
100 CAPSULE ORAL EVERY 12 HOURS
Qty: 4 CAPSULE | Refills: 0 | Status: SHIPPED | OUTPATIENT
Start: 2025-03-28 | End: 2025-03-28

## 2025-03-28 RX ORDER — DOXYCYCLINE 100 MG/1
100 CAPSULE ORAL EVERY 12 HOURS
Qty: 4 CAPSULE | Refills: 0 | Status: SHIPPED | OUTPATIENT
Start: 2025-03-28 | End: 2025-03-31

## 2025-03-28 RX ORDER — POTASSIUM CHLORIDE 20 MEQ/1
20 TABLET, EXTENDED RELEASE ORAL ONCE
Status: COMPLETED | OUTPATIENT
Start: 2025-03-28 | End: 2025-03-28

## 2025-03-28 RX ADMIN — POTASSIUM CHLORIDE 20 MEQ: 1500 TABLET, EXTENDED RELEASE ORAL at 05:03

## 2025-03-28 RX ADMIN — DOXYCYCLINE HYCLATE 100 MG: 100 TABLET, COATED ORAL at 08:03

## 2025-03-28 NOTE — PROGRESS NOTES
[facility]  Cardiology  Progress Note    Patient Name: Marcos Elaine  MRN: [id number]  Admission Date: 3/25/2025  Hospital Length of Stay: 3 days  Code Status: Full Code   Attending Physician: Delfino Arceo MD   Primary Care Physician: Hiren Gonzalez MD  Expected Discharge Date: 3/31/2025  Principal Problem:Presence of cardiac resynchronization therapy pacemaker (CRT-P)    Subjective:     Hospital Course:   Chest tube placed in the PACU yesterday. The chest tube continues to have air leak with pulmonology following. CT chest no evidence of COPD/emphysema (slow healing from PTX). CTS has chest tube clamped after he tolerated a water seal trial.      Interval History: NAEON. Near complete resolution of pneumothorax.    Objective:     Vital Signs (Most Recent):  Temp: 97.8 °F (36.6 °C) (03/28/25 1101)  Pulse: 75 (03/28/25 1201)  Resp: (!) 26 (03/28/25 1201)  BP: 135/72 (03/28/25 1201)  SpO2: 96 % (03/28/25 1201) Vital Signs (24h Range):  Temp:  [97.8 °F (36.6 °C)-98.5 °F (36.9 °C)] 97.8 °F (36.6 °C)  Pulse:  [] 75  Resp:  [11-26] 26  SpO2:  [93 %-96 %] 96 %  BP: (119-156)/(66-83) 135/72     Weight: 73.3 kg (161 lb 9.6 oz)  Body mass index is 20.75 kg/m².     SpO2: 96 %         Intake/Output Summary (Last 24 hours) at 3/28/2025 1304  Last data filed at 3/28/2025 1201  Gross per 24 hour   Intake 480 ml   Output 1400 ml   Net -920 ml       Lines/Drains/Airways       Drain  Duration                  Chest Tube 03/25/25 1645 Left 2 days              Peripheral Intravenous Line  Duration                  Peripheral IV - Single Lumen 03/25/25 0814 20 G Left;Posterior Hand 3 days         Peripheral IV - Single Lumen 03/25/25 0827 20 G Anterior;Right Forearm 3 days                       Physical Exam  Constitutional:       Appearance: Normal appearance.   HENT:      Mouth/Throat:      Mouth: Mucous membranes are moist.   Eyes:      Extraocular Movements: Extraocular movements intact.       Conjunctiva/sclera: Conjunctivae normal.   Cardiovascular:      Rate and Rhythm: Normal rate and regular rhythm.      Comments: PPM site without hematoma or induration  Pulmonary:      Effort: Pulmonary effort is normal.      Comments: L sided chest tube in place  Abdominal:      General: Abdomen is flat.      Palpations: Abdomen is soft.   Skin:     General: Skin is warm and dry.   Neurological:      General: No focal deficit present.      Mental Status: He is oriented to person, place, and time.            Significant Labs: All pertinent lab results from the last 24 hours have been reviewed.    Significant Imaging: X-Ray: Near complete resolution of left apical PTX  Assessment and Plan:     * Presence of cardiac resynchronization therapy pacemaker (CRT-P)  No issues pacing  No concerns from a cardiac standpoint    Pneumothorax, postprocedural  Complication after CRT-P LV lead.   CXR after procedure complicated by apical pneumothorax  S/p Thoracentesis by pulm   Admitted to CICU for monitoring   Plan:   - CXR in the morning   - Pulm following   - Chest tube connected to suction   - CT chest non con today  - Thoracic surgery consult- eval for pleurodesis    Appreciate pulm and throacic surgery's help managing this patient.        VTE Risk Mitigation (From admission, onward)      None            Norberto Vides MD  Cardiology  [facility]

## 2025-03-28 NOTE — CARE UPDATE
Thoracic Surgery Care Update:     Clamp trial successful. CXR without PTX. CT pulled without issue. Keep dressing for 24 hours. No other thoracic surgery needs. Please call with questions or concerns.     Rios Acuna  PGY-4

## 2025-03-28 NOTE — PLAN OF CARE
CICU DAILY GOALS       A: Awake    RASS: Goal -    Actual - RASS (Garcia Agitation-Sedation Scale): alert and calm   Restraint necessity:    B: Breath   SBT: Not intubated   C: Coordinate A & B, analgesics/sedatives   Pain: managed    SAT: Not intubated  D: Delirium   CAM-ICU:     Activity: Activity Management: Arm raise - L1  FAS: Feeding/Nutrition   Diet order: Diet/Nutrition Received: consistent carb/diabetic diet,   Fluid restriction:     Nutritional Supplement Intake: Quantity, Type:   T: Thrombus   DVT prophylaxis: VTE Core Measure: Pharmacological prophylaxis initiated/maintained  H: HOB Elevation   Head of Bed (HOB) Positioning: HOB at 30-45 degrees  U: Ulcer Prophylaxis   GI: no  G: Glucose control   managed    S: Skin   Bathing/Skin Care: dressed/undressed;bath, complete;linen changed (03/26/25 1101)  Wounds: No  Wound care consulted: No  B: Bowel Function   no issues   I: Indwelling Catheters   Gamino necessity:     CVC necessity: No  D: De-escalation Antibx   No  Family/Goals of care/Code Status   Code Status: Full Code     No acute events throughout day, VS and assessment per flow sheet, patient progressing towards goals as tolerated, plan of care reviewed with Marcos Elaine and family, all concerns addressed, will continue to monitor.

## 2025-03-28 NOTE — SUBJECTIVE & OBJECTIVE
Interval History: No air leak this am. CT placed to water seal. CXR looks good.     Medications:  Continuous Infusions:  Scheduled Meds:   doxycycline  100 mg Oral Q12H     PRN Meds:  Current Facility-Administered Medications:     acetaminophen, 650 mg, Oral, Q4H PRN    oxyCODONE-acetaminophen, 1 tablet, Oral, Q6H PRN     Review of patient's allergies indicates:  No Known Allergies  Objective:     Vital Signs (Most Recent):  Temp: 97.8 °F (36.6 °C) (03/28/25 0301)  Pulse: 63 (03/28/25 0600)  Resp: 15 (03/28/25 0600)  BP: (!) 149/68 (03/28/25 0600)  SpO2: 95 % (03/28/25 0600) Vital Signs (24h Range):  Temp:  [97.8 °F (36.6 °C)-98.5 °F (36.9 °C)] 97.8 °F (36.6 °C)  Pulse:  [63-88] 63  Resp:  [11-23] 15  SpO2:  [93 %-97 %] 95 %  BP: (119-175)/(64-83) 149/68     Intake/Output - Last 3 Shifts         03/26 0700  03/27 0659 03/27 0700  03/28 0659 03/28 0700  03/29 0659    P.O. 180      IV Piggyback       Total Intake(mL/kg) 180 (2.3)      Urine (mL/kg/hr) 2000 (1.1) 900 (0.5)     Stool 0      Chest Tube 0 0     Total Output 2000 900     Net -1820 -900            Stool Occurrence 0 x              SpO2: 95 %        Physical Exam  Vitals reviewed.   Constitutional:       General: He is not in acute distress.  HENT:      Head: Normocephalic and atraumatic.      Nose: Nose normal.   Eyes:      General:         Right eye: No discharge.         Left eye: No discharge.   Cardiovascular:      Rate and Rhythm: Normal rate and regular rhythm.   Pulmonary:      Effort: Pulmonary effort is normal. No respiratory distress.      Breath sounds: No stridor.      Comments: Left sided CT with hard to elicit leak. Multiple coughs. Off suction and placed back on there was a small leak but resolved.   Musculoskeletal:         General: Normal range of motion.      Cervical back: Normal range of motion.   Skin:     General: Skin is warm and dry.      Capillary Refill: Capillary refill takes 2 to 3 seconds.   Neurological:      General: No focal  deficit present.      Mental Status: He is alert and oriented to person, place, and time.   Psychiatric:         Mood and Affect: Mood normal.         Behavior: Behavior normal.            Significant Labs:  All pertinent labs from the last 24 hours have been reviewed.    Significant Diagnostics:  I have reviewed and interpreted all pertinent imaging results/findings within the past 24 hours.    VTE Risk Mitigation (From admission, onward)      None

## 2025-03-28 NOTE — SUBJECTIVE & OBJECTIVE
Interval History: NAEON. Near complete resolution of pneumothorax.    Objective:     Vital Signs (Most Recent):  Temp: 97.8 °F (36.6 °C) (03/28/25 1101)  Pulse: 75 (03/28/25 1201)  Resp: (!) 26 (03/28/25 1201)  BP: 135/72 (03/28/25 1201)  SpO2: 96 % (03/28/25 1201) Vital Signs (24h Range):  Temp:  [97.8 °F (36.6 °C)-98.5 °F (36.9 °C)] 97.8 °F (36.6 °C)  Pulse:  [] 75  Resp:  [11-26] 26  SpO2:  [93 %-96 %] 96 %  BP: (119-156)/(66-83) 135/72     Weight: 73.3 kg (161 lb 9.6 oz)  Body mass index is 20.75 kg/m².     SpO2: 96 %         Intake/Output Summary (Last 24 hours) at 3/28/2025 1304  Last data filed at 3/28/2025 1201  Gross per 24 hour   Intake 480 ml   Output 1400 ml   Net -920 ml       Lines/Drains/Airways       Drain  Duration                  Chest Tube 03/25/25 1645 Left 2 days              Peripheral Intravenous Line  Duration                  Peripheral IV - Single Lumen 03/25/25 0814 20 G Left;Posterior Hand 3 days         Peripheral IV - Single Lumen 03/25/25 0827 20 G Anterior;Right Forearm 3 days                       Physical Exam  Constitutional:       Appearance: Normal appearance.   HENT:      Mouth/Throat:      Mouth: Mucous membranes are moist.   Eyes:      Extraocular Movements: Extraocular movements intact.      Conjunctiva/sclera: Conjunctivae normal.   Cardiovascular:      Rate and Rhythm: Normal rate and regular rhythm.      Comments: PPM site without hematoma or induration  Pulmonary:      Effort: Pulmonary effort is normal.      Comments: L sided chest tube in place  Abdominal:      General: Abdomen is flat.      Palpations: Abdomen is soft.   Skin:     General: Skin is warm and dry.   Neurological:      General: No focal deficit present.      Mental Status: He is oriented to person, place, and time.            Significant Labs: All pertinent lab results from the last 24 hours have been reviewed.    Significant Imaging: X-Ray: Near complete resolution of left apical PTX

## 2025-03-28 NOTE — CARE UPDATE
EP Care Update    Thoracic surgery evaluated yesterday and this AM, suspected air leak resolved. Chest tube placed to water seal. Cxr reviewed and without re-expanded pneumothorax.     Plan for clamp trial today. Likely repeat cxr this PM and potentially remove chest tube. Will continue to follow Thoracic surgery and Pulmonology recs.     EP continuing to follow.    DERICK Martinez  Cardiovascular Disease fellow, PGY-4  [facility]

## 2025-03-28 NOTE — ASSESSMENT & PLAN NOTE
S/p lead exchange with EP the patient was found to have a small left sided apical pneumothorax. Upon evaluation by the Pulm team the patient was not in respiratory distress, HR, BP stable, and asymptomatic otherwise. Recovering well post-procedure.    Chest tube placed in the PACU 3/25. When evaluated 3/28 CTS had placed the CT to water eal only with plans to clamp and repeat the CXR.     Review of CT imaging does not indicate emphysematous changes of the lung. The pulmonary team appreciates surgeries input.      Plan:   -- Plan per CTS    Thank you for the consult. We will sign off.

## 2025-03-28 NOTE — SUBJECTIVE & OBJECTIVE
Interval History: CTS consulted. No operative management -- air leak is small per their evaluation. This morning put the chest tube to water seal only. Will repeat CXR this PM.       Objective:     Vital Signs (Most Recent):  Temp: 98.1 °F (36.7 °C) (03/28/25 0701)  Pulse: 74 (03/28/25 1001)  Resp: 16 (03/28/25 1001)  BP: 133/66 (03/28/25 1001)  SpO2: 95 % (03/28/25 1001) Vital Signs (24h Range):  Temp:  [97.8 °F (36.6 °C)-98.5 °F (36.9 °C)] 98.1 °F (36.7 °C)  Pulse:  [] 74  Resp:  [11-23] 16  SpO2:  [93 %-96 %] 95 %  BP: (119-175)/(64-83) 133/66     Weight: 73.3 kg (161 lb 9.6 oz)  Body mass index is 20.75 kg/m².      Intake/Output Summary (Last 24 hours) at 3/28/2025 1029  Last data filed at 3/28/2025 0901  Gross per 24 hour   Intake 240 ml   Output 1050 ml   Net -810 ml        Physical Exam  Constitutional:       Appearance: Normal appearance.   HENT:      Mouth/Throat:      Mouth: Mucous membranes are moist.   Eyes:      Extraocular Movements: Extraocular movements intact.      Conjunctiva/sclera: Conjunctivae normal.   Cardiovascular:      Rate and Rhythm: Normal rate and regular rhythm.      Comments: PPM site without hematoma or induration  Pulmonary:      Effort: Pulmonary effort is normal.      Comments: L sided chest tube in place,   Abdominal:      General: Abdomen is flat.      Palpations: Abdomen is soft.   Skin:     General: Skin is warm and dry.   Neurological:      General: No focal deficit present.      Mental Status: He is oriented to person, place, and time.          Vents:       Lines/Drains/Airways       Drain  Duration                  Chest Tube 03/25/25 1645 Left 2 days              Peripheral Intravenous Line  Duration                  Peripheral IV - Single Lumen 03/25/25 0814 20 G Left;Posterior Hand 3 days         Peripheral IV - Single Lumen 03/25/25 0827 20 G Anterior;Right Forearm 3 days                    Significant Labs:    CBC/Anemia Profile:  Recent Labs   Lab 03/28/25  0401    WBC 6.74   HGB 14.3   HCT 42.9      MCV 89   RDW 13.2        Chemistries:  Recent Labs   Lab 03/28/25  0409      K 3.8      CO2 25   BUN 14   CREATININE 0.7   CALCIUM 8.8   GLUCOSE 97       All pertinent labs within the past 24 hours have been reviewed.    Significant Imaging:  I have reviewed all pertinent imaging results/findings within the past 24 hours.

## 2025-03-28 NOTE — PROGRESS NOTES
[facility]  Thoracic Surgery  Progress Note    Subjective:     History of Present Illness:  69M with complete heart block admitted after lead exchange as he developed a PTX and a chest tube was place. He has had an air leak for 2 days and  Thoracic consulted for persistent leak. His lung has expanded and he does have a tiny residual PTX. A CT was obtained and unremarkable. He has no other medical problem. He has never had chest surgery before or prior PTX. No blood thinners. Ambulates without issue. Currently in ICU for monitoring but stable on RA doing well.     Post-Op Info:  Procedure(s) (LRB):  REVISION, PACEMAKER LEAD (Left)   3 Days Post-Op     Interval History: No air leak this am. CT placed to water seal. CXR looks good.     Medications:  Continuous Infusions:  Scheduled Meds:   doxycycline  100 mg Oral Q12H     PRN Meds:  Current Facility-Administered Medications:     acetaminophen, 650 mg, Oral, Q4H PRN    oxyCODONE-acetaminophen, 1 tablet, Oral, Q6H PRN     Review of patient's allergies indicates:  No Known Allergies  Objective:     Vital Signs (Most Recent):  Temp: 97.8 °F (36.6 °C) (03/28/25 0301)  Pulse: 63 (03/28/25 0600)  Resp: 15 (03/28/25 0600)  BP: (!) 149/68 (03/28/25 0600)  SpO2: 95 % (03/28/25 0600) Vital Signs (24h Range):  Temp:  [97.8 °F (36.6 °C)-98.5 °F (36.9 °C)] 97.8 °F (36.6 °C)  Pulse:  [63-88] 63  Resp:  [11-23] 15  SpO2:  [93 %-97 %] 95 %  BP: (119-175)/(64-83) 149/68     Intake/Output - Last 3 Shifts         03/26 0700  03/27 0659 03/27 0700 03/28 0659 03/28 0700 03/29 0659    P.O. 180      IV Piggyback       Total Intake(mL/kg) 180 (2.3)      Urine (mL/kg/hr) 2000 (1.1) 900 (0.5)     Stool 0      Chest Tube 0 0     Total Output 2000 900     Net -1820 -900            Stool Occurrence 0 x              SpO2: 95 %        Physical Exam  Vitals reviewed.   Constitutional:       General: He is not in acute distress.  HENT:      Head: Normocephalic and atraumatic.       Nose: Nose normal.   Eyes:      General:         Right eye: No discharge.         Left eye: No discharge.   Cardiovascular:      Rate and Rhythm: Normal rate and regular rhythm.   Pulmonary:      Effort: Pulmonary effort is normal. No respiratory distress.      Breath sounds: No stridor.      Comments: Left sided CT with hard to elicit leak. Multiple coughs. Off suction and placed back on there was a small leak but resolved.   Musculoskeletal:         General: Normal range of motion.      Cervical back: Normal range of motion.   Skin:     General: Skin is warm and dry.      Capillary Refill: Capillary refill takes 2 to 3 seconds.   Neurological:      General: No focal deficit present.      Mental Status: He is alert and oriented to person, place, and time.   Psychiatric:         Mood and Affect: Mood normal.         Behavior: Behavior normal.            Significant Labs:  All pertinent labs from the last 24 hours have been reviewed.    Significant Diagnostics:  I have reviewed and interpreted all pertinent imaging results/findings within the past 24 hours.    VTE Risk Mitigation (From admission, onward)      None          Assessment/Plan:     Pneumothorax, postprocedural  69M with complete heart block who had pacemaker who developed a PTX after lead exchange. He has a air leak but it was difficult to elicit . Likely a small leak that has now appeared to have resolved.     -CT to water seal. CXR looks good. Clamp trial and possibly remove this PM if CXR okay.   -Should it not resolve or become worse we will consider other options  -Please call with questions         Rios Acuna MD  Thoracic Surgery  [facility]

## 2025-03-28 NOTE — ASSESSMENT & PLAN NOTE
69M with complete heart block who had pacemaker who developed a PTX after lead exchange. He has a air leak but it was difficult to elicit . Likely a small leak that has now appeared to have resolved.     -CT to water seal. CXR looks good. Clamp trial and possibly remove this PM if CXR okay.   -Should it not resolve or become worse we will consider other options  -Please call with questions

## 2025-03-28 NOTE — PROGRESS NOTES
"[facility]  Pulmonology  Progress Note    Patient Name: Marcos Elaine  MRN: [id number]  Admission Date: 3/25/2025  Hospital Length of Stay: 3 days  Code Status: Full Code  Attending Provider: Delfino Arceo MD  Primary Care Provider: Hiren Gonzalez MD   Principal Problem: Presence of cardiac resynchronization therapy pacemaker (CRT-P)    Subjective:     HPI:  69 yoM CHB s/p DC PM with upgrade to CRT-P 10/14/19 ([facility]) with improvement in EF. He is here today for LV lead revision and reimplant. Procedure complicated by apical pneumothorax noted on the post-procedure CXR and pulmonary consulted for assistance with management of the new finding. Hx of smoking "years ago. He otherwise denies other lung problems.     Overview/Hospital Course:  .    Interval History: CTS consulted. No operative management -- air leak is small per their evaluation. This morning put the chest tube to water seal only. Will repeat CXR this PM.       Objective:     Vital Signs (Most Recent):  Temp: 98.1 °F (36.7 °C) (03/28/25 0701)  Pulse: 74 (03/28/25 1001)  Resp: 16 (03/28/25 1001)  BP: 133/66 (03/28/25 1001)  SpO2: 95 % (03/28/25 1001) Vital Signs (24h Range):  Temp:  [97.8 °F (36.6 °C)-98.5 °F (36.9 °C)] 98.1 °F (36.7 °C)  Pulse:  [] 74  Resp:  [11-23] 16  SpO2:  [93 %-96 %] 95 %  BP: (119-175)/(64-83) 133/66     Weight: 73.3 kg (161 lb 9.6 oz)  Body mass index is 20.75 kg/m².      Intake/Output Summary (Last 24 hours) at 3/28/2025 1029  Last data filed at 3/28/2025 0901  Gross per 24 hour   Intake 240 ml   Output 1050 ml   Net -810 ml        Physical Exam  Constitutional:       Appearance: Normal appearance.   HENT:      Mouth/Throat:      Mouth: Mucous membranes are moist.   Eyes:      Extraocular Movements: Extraocular movements intact.      Conjunctiva/sclera: Conjunctivae normal.   Cardiovascular:      Rate and Rhythm: Normal rate and regular rhythm.      Comments: PPM site without hematoma or " induration  Pulmonary:      Effort: Pulmonary effort is normal.      Comments: L sided chest tube in place,   Abdominal:      General: Abdomen is flat.      Palpations: Abdomen is soft.   Skin:     General: Skin is warm and dry.   Neurological:      General: No focal deficit present.      Mental Status: He is oriented to person, place, and time.          Vents:       Lines/Drains/Airways       Drain  Duration                  Chest Tube 03/25/25 1645 Left 2 days              Peripheral Intravenous Line  Duration                  Peripheral IV - Single Lumen 03/25/25 0814 20 G Left;Posterior Hand 3 days         Peripheral IV - Single Lumen 03/25/25 0827 20 G Anterior;Right Forearm 3 days                    Significant Labs:    CBC/Anemia Profile:  Recent Labs   Lab 03/28/25  0409   WBC 6.74   HGB 14.3   HCT 42.9      MCV 89   RDW 13.2        Chemistries:  Recent Labs   Lab 03/28/25  0409      K 3.8      CO2 25   BUN 14   CREATININE 0.7   CALCIUM 8.8   GLUCOSE 97       All pertinent labs within the past 24 hours have been reviewed.    Significant Imaging:  I have reviewed all pertinent imaging results/findings within the past 24 hours.  Assessment/Plan:     Pulmonary  Pneumothorax, postprocedural  S/p lead exchange with EP the patient was found to have a small left sided apical pneumothorax. Upon evaluation by the Pulm team the patient was not in respiratory distress, HR, BP stable, and asymptomatic otherwise. Recovering well post-procedure.    Chest tube placed in the PACU 3/25. When evaluated 3/28 CTS had placed the CT to water eal only with plans to clamp and repeat the CXR.     Review of CT imaging does not indicate emphysematous changes of the lung. The pulmonary team appreciates surgeries input.      Plan:   -- Plan per CTS note including placing to water seal, clamping and f/u xray    Thank you for the consult. We will sign off.                   Rajeev Payton MD  Pulmonology  [facility]  [facility]iac Int[facility]

## 2025-03-29 VITALS
RESPIRATION RATE: 20 BRPM | DIASTOLIC BLOOD PRESSURE: 76 MMHG | HEART RATE: 74 BPM | BODY MASS INDEX: 20.6 KG/M2 | WEIGHT: 160.5 LBS | SYSTOLIC BLOOD PRESSURE: 150 MMHG | HEIGHT: 74 IN | TEMPERATURE: 98 F | OXYGEN SATURATION: 95 %

## 2025-03-29 PROBLEM — J95.811 PNEUMOTHORAX, POSTPROCEDURAL: Status: RESOLVED | Noted: 2025-03-25 | Resolved: 2025-03-29

## 2025-03-29 LAB
ANION GAP (OHS): 9 MMOL/L (ref 8–16)
BUN SERPL-MCNC: 13 MG/DL (ref 8–23)
CALCIUM SERPL-MCNC: 8.8 MG/DL (ref 8.7–10.5)
CHLORIDE SERPL-SCNC: 107 MMOL/L (ref 95–110)
CO2 SERPL-SCNC: 23 MMOL/L (ref 23–29)
CREAT SERPL-MCNC: 0.7 MG/DL (ref 0.5–1.4)
GFR SERPLBLD CREATININE-BSD FMLA CKD-EPI: >60 ML/MIN/1.73/M2
GLUCOSE SERPL-MCNC: 99 MG/DL (ref 70–110)
POTASSIUM SERPL-SCNC: 3.8 MMOL/L (ref 3.5–5.1)
SODIUM SERPL-SCNC: 139 MMOL/L (ref 136–145)

## 2025-03-29 PROCEDURE — 94761 N-INVAS EAR/PLS OXIMETRY MLT: CPT

## 2025-03-29 PROCEDURE — 99238 HOSP IP/OBS DSCHRG MGMT 30/<: CPT | Mod: GC,,, | Performed by: INTERNAL MEDICINE

## 2025-03-29 PROCEDURE — 25000003 PHARM REV CODE 250: Performed by: INTERNAL MEDICINE

## 2025-03-29 PROCEDURE — 25000003 PHARM REV CODE 250

## 2025-03-29 PROCEDURE — 80048 BASIC METABOLIC PNL TOTAL CA: CPT

## 2025-03-29 RX ORDER — POTASSIUM CHLORIDE 20 MEQ/1
20 TABLET, EXTENDED RELEASE ORAL ONCE
Status: COMPLETED | OUTPATIENT
Start: 2025-03-29 | End: 2025-03-29

## 2025-03-29 RX ADMIN — POTASSIUM CHLORIDE 20 MEQ: 1500 TABLET, EXTENDED RELEASE ORAL at 05:03

## 2025-03-29 RX ADMIN — DOXYCYCLINE HYCLATE 100 MG: 100 TABLET, COATED ORAL at 08:03

## 2025-03-29 NOTE — NURSING
Patient received discharge instructions. No heavy lifting. No raising of left arm over head. Keeping the arm in a sling during sleep for 6 wks. Calling the cardiology if site becomes warm, swollen, or painful. Patient is aware not to get site wet. Patient received doxycycline prior to discharge, and is aware of next doses. Patient is aware of next appointments. Patient peripheral iv's were removed early this morning. Patient escorted off unit via wheelchair with nurse at side. Patient was picked up by his wife, in the front entrance of the hospital.

## 2025-03-29 NOTE — DISCHARGE SUMMARY
[facility]  Cardiology  Discharge Summary      Patient Name: Marcos Elaine  MRN: [id number]  Admission Date: 3/25/2025  Hospital Length of Stay: 4 days  Discharge Date and Time:  03/29/2025 8:33 AM  Attending Physician: Delfino Arceo MD    Discharging Provider: Norberto Vides MD  Primary Care Physician: Hiren Gonzalez MD    HPI:   69 yoM CHB s/p DC PM with upgrade to CRT-P 10/14/19 ([facility]) with improvement in EF. He is here today for LV lead revision and reimplant. Procedure complicated by apical pneumothorax noted on the post-procedure CXR. Patient is now s/p thoracenrtesis and is being admitted to CICU for close monitoring.     Procedure(s) (LRB):  REVISION, PACEMAKER LEAD (Left)     Indwelling Lines/Drains at time of discharge:  Lines/Drains/Airways       None                   Hospital Course:  Chest tube placed in the PACU yesterday after PTX s/p PPM battery and lead change on 03/25/2025. CT chest no evidence of COPD/emphysema (slow healing from PTX). Chest tube clamped and removed on 03/28/2025.      Goals of Care Treatment Preferences:  Code Status: Full Code      Consults:   Consults (From admission, onward)          Status Ordering Provider     Inpatient consult to Cardiothoracic Surgery  Once        Provider:  Rios Acuna MD    Completed GILLIES, CONNOR M     Inpatient consult to Pulmonology  Once        Provider:  Rajeev Payton MD    Completed AUDREY MCCOY            Significant Diagnostic Studies: Radiology: X-Ray: CXR: X-Ray Chest 1 View (CXR):   Results for orders placed or performed during the hospital encounter of 03/25/25   X-Ray Chest 1 View    Narrative    EXAMINATION:  XR CHEST 1 VIEW    CLINICAL HISTORY:  chest tube in place for pneumothorax;    TECHNIQUE:  Single frontal view of the chest was performed.    COMPARISON:  03/25/2025    FINDINGS:  Left pleural drainage catheter has been placed with near complete re-expansion of the left  hemithorax.  Small left apical pneumothorax remains.  No detrimental change in the cardiopulmonary status otherwise.      Impression    As above      Electronically signed by: Vernon Lanier MD  Date:    03/25/2025  Time:    19:38    and X-Ray Chest PA and Lateral (CXR): No results found for this visit on 03/25/25.    Pending Diagnostic Studies:       None            Final Active Diagnoses:    Diagnosis Date Noted POA    PRINCIPAL PROBLEM:  Presence of cardiac resynchronization therapy pacemaker (CRT-P) [Z95.0] 03/06/2013 Yes      Problems Resolved During this Admission:    Diagnosis Date Noted Date Resolved POA    Pneumothorax, postprocedural [J95.811] 03/25/2025 03/29/2025 Yes     No new Assessment & Plan notes have been filed under this hospital service since the last note was generated.  Service: Cardiology      Discharged Condition: good    Disposition: Home or Self Care    Follow Up:    Patient Instructions:      Ambulatory referral/consult to Cardiology   Standing Status: Future   Referral Priority: Routine Referral Type: Consultation   Referral Reason: Specialty Services Required   Requested Specialty: Cardiology   Number of Visits Requested: 1     Medications:  Reconciled Home Medications:      Medication List        START taking these medications      doxycycline 100 MG Cap  Commonly known as: VIBRAMYCIN  Take 1 capsule (100 mg total) by mouth every 12 (twelve) hours. for 4 doses            CONTINUE taking these medications      glucosamine-chondroitin 500-400 mg tablet  Take 1 tablet by mouth 3 (three) times daily.     KONSYL FIBER ORAL  Take by mouth.     LORazepam 0.5 MG tablet  Commonly known as: ATIVAN  TAKE 1 TABLET BY MOUTH EVERY 6 HOURS AS NEEDED FOR ANXIETY     multivitamin capsule  Take 1 capsule by mouth once daily.     simvastatin 40 MG tablet  Commonly known as: ZOCOR  Take 1 tablet by mouth once daily              Time spent on the discharge of patient: 40 minutes    Norberto Vides  MD  Cardiology  [facility]

## 2025-03-29 NOTE — PLAN OF CARE
----- Message from Sarita Kearney sent at 5/28/2020  4:16 PM EDT -----  Regarding: Dr. Breanne Rodrigues  General Message/Vendor Calls    Caller's first and last name: Irwin frey/[employer]      Reason for call: Prior auth needed, discuss clinical questions regarding pt's medication - Glatiramer 20 mg per ml syringe      Callback required yes/no and why: Yes      Best contact number(s): [phone] ref #[phone]      Details to clarify the request:      Sarita Kearney Electrophysiology Plan of Care    Patient doing well overnight. Thoracic surgery removed chest tube after successful clamp trial. No issues overnight.      Post-CIED implantation instructions:  Doxycycline 100 mg BID x 5 days  Avoid all heparin products (e.g., DOACs, enoxaparin, heparin) for 5 days following implant. If patient is taking coumadin, this can be continued uninterrupted  CXR & ECG reviewed  Sling to arm for first 48 hours continuously, then only nightly for 6 weeks  No lifting elbow above shoulder height on arm ipsilateral to implant device for 6 weeks  No lifting over 5 lbs. for 2 weeks with arm ipsilateral to implanted device  No driving for 1 week if patient uses arm contralateral to implantation and 4 weeks if patient uses arm ipsilateral to implantation  Patient can shower starting today. Do not submerge surgical site for 1 month (e.g., bathing or swimming)  Dressing can be removed today (Mepilex dressing only) or in 1 week at device clinic follow up (Aquacel dressing only)  Patient will be scheduled for device clinic follow up in 1 week to assess surgical site and device interrogation  Please contact EP for any questions or concerns at [phone] or page EP fellow on call  Patient is to seek immediate medical attention for acute onset of chest pain, shortness of breath, syncope, or evidence of surgical site infection or hematoma.      Wally Erazo MD  Cardiovascular Disease PGY-VI  [facility]

## 2025-03-29 NOTE — PLAN OF CARE
[facility]  Discharge Final Note    Primary Care Provider: Hiren Gonzalez MD    Expected Discharge Date: 3/29/2025    Final Discharge Note (most recent)       Final Note - 03/29/25 1110          Final Note    Assessment Type Final Discharge Note (P)      Anticipated Discharge Disposition Home or Self Care (P)      What phone number can be called within the next 1-3 days to see how you are doing after discharge? [phone] (P)      Hospital Resources/Appts/Education Provided Provided patient/caregiver with written discharge plan information;Provided education on problems/symptoms using teachback;Appointments scheduled and added to AVS (P)         Post-Acute Status    Post-Acute Authorization Other (P)      Other Status Awaiting f/u Appts (P)    Cardiology Follow-Up    Discharge Delays None known at this time (P)                      Important Message from Medicare             Contact Info       Device Check Clinic        Next Steps: Follow up in 1 week(s)    Instructions: For wound re-check    Jarod Hanson MD   Specialty: Electrophysiology, Cardiovascular Disease, Cardiology    [address]  [facility] [address]   Phone: [phone]       Next Steps: Follow up in 3 month(s)          Pt. discharging home with Self-Care. Pt. waiting for scheduling of Cardiology appointment.     Sampson Coombs LMSW

## 2025-03-29 NOTE — PLAN OF CARE
CICU DAILY GOALS       A: Awake    RASS: Goal -    Actual - RASS (Garcia Agitation-Sedation Scale): alert and calm   Restraint necessity:    B: Breath   SBT: Not intubated   C: Coordinate A & B, analgesics/sedatives   Pain: managed    SAT: Not intubated  D: Delirium   CAM-ICU:     Activity: Activity Management: Arm raise - L1, Rolling - L1  FAS: Feeding/Nutrition   Diet order: Diet/Nutrition Received: consistent carb/diabetic diet,   Fluid restriction:     Nutritional Supplement Intake: Quantity, Type:  Cardiac Diet  T: Thrombus   DVT prophylaxis: VTE Core Measure: Pharmacological prophylaxis initiated/maintained  H: HOB Elevation   Head of Bed (HOB) Positioning: HOB at 30-45 degrees  U: Ulcer Prophylaxis   GI: no  G: Glucose control   managed    S: Skin   Bathing/Skin Care: dressed/undressed;bath, complete;linen changed (03/26/25 1101)  Wounds: No  Wound care consulted: No  B: Bowel Function   no issues   I: Indwelling Catheters   Gamino necessity:     CVC necessity: No  D: De-escalation Antibx   No  Family/Goals of care/Code Status   Code Status: Full Code     No acute events throughout day, VS and assessment per flow sheet, patient progressing towards goals as tolerated, plan of care reviewed with Marcos Elaine and family, all concerns addressed, will continue to monitor.    Problem: Adult Inpatient Plan of Care  Goal: Patient-Specific Goal (Individualized)  Outcome: Progressing  Goal: Optimal Comfort and Wellbeing  Outcome: Progressing  Goal: Readiness for Transition of Care  Outcome: Progressing

## 2025-03-29 NOTE — NURSING
CICU DAILY GOALS       A: Awake    RASS: Goal -    Actual - RASS (Garcia Agitation-Sedation Scale): alert and calm   Restraint necessity:    B: Breath   SBT: Not intubated   C: Coordinate A & B, analgesics/sedatives   Pain: managed    SAT: Not intubated  D: Delirium   CAM-ICU:    E: Early(intubated/ Progressive (non-intubated) Mobility   MOVE Screen: Pass   Activity: Activity Management: Ambulated in room - L4, Ambulated in mancera - L4, Up in chair - L3  FAS: Feeding/Nutrition   Diet order: Diet/Nutrition Received: consistent carb/diabetic diet,   Fluid restriction:     Nutritional Supplement Intake:  T: Thrombus   DVT prophylaxis: VTE Core Measure: Pharmacological prophylaxis initiated/maintained  H: HOB Elevation   Head of Bed (HOB) Positioning: HOB at 30-45 degrees  U: Ulcer Prophylaxis   GI: yes  G: Glucose control   managed    S: Skin   Bathing/Skin Care: dressed/undressed;bath, complete;linen changed (03/26/25 1101)  Wounds: No  Wound care consulted: No  B: Bowel Function   no issues   I: Indwelling Catheters   Gamino necessity:     CVC necessity: No  D: De-escalation Antibx   No  Plan for the day     Family/Goals of care/Code Status   Code Status: Full Code     Chest tube was placed to water seal in the morning. Patient tolerated well. Chest tube was then clamped per cardiothoracic MD, and then two series of chest x-rays completed. Cardiothoracic MD came to bedside to remove chest tube this evening . Patient tolerated well and site is clean, dry and intact. No bleeding or complications noted. No acute events throughout day, VS and assessment per flow sheet, patient progressing towards goals as tolerated, plan of care reviewed with Marcos Elaine and family, all concerns addressed.

## 2025-03-31 LAB
OHS CV AF BURDEN PERCENT: < 1
OHS CV DC REMOTE DEVICE TYPE: NORMAL
OHS CV RV PACING PERCENT: 99 %

## 2025-04-01 ENCOUNTER — PATIENT OUTREACH (OUTPATIENT)
Dept: ADMINISTRATIVE | Facility: CLINIC | Age: 70
End: 2025-04-01
Payer: MEDICARE

## 2025-04-01 ENCOUNTER — CLINICAL SUPPORT (OUTPATIENT)
Dept: CARDIOLOGY | Facility: HOSPITAL | Age: 70
End: 2025-04-01
Attending: INTERNAL MEDICINE
Payer: MEDICARE

## 2025-04-01 DIAGNOSIS — Z95.0 PRESENCE OF CARDIAC RESYNCHRONIZATION THERAPY PACEMAKER (CRT-P): ICD-10-CM

## 2025-04-01 DIAGNOSIS — I44.2 CHB (COMPLETE HEART BLOCK): ICD-10-CM

## 2025-04-01 LAB
OHS QRS DURATION: 152 MS
OHS QTC CALCULATION: 484 MS

## 2025-04-01 PROCEDURE — 93010 ELECTROCARDIOGRAM REPORT: CPT | Mod: ,,, | Performed by: INTERNAL MEDICINE

## 2025-04-01 PROCEDURE — 93280 PM DEVICE PROGR EVAL DUAL: CPT

## 2025-04-01 PROCEDURE — 93005 ELECTROCARDIOGRAM TRACING: CPT | Performed by: INTERNAL MEDICINE

## 2025-04-01 NOTE — PROGRESS NOTES
C3 nurse spoke with Marcos Elaine for a TCC post hospital discharge follow up call. The patient has a scheduled HOSFU appointment with Hiren Gonzalez MD   on 4/8/25 @ 10am.

## 2025-04-01 NOTE — PROGRESS NOTES
C3 nurse attempted to contact Marcos Elaine for a TCC post hospital discharge follow up call. No answer. Left voicemail with callback information. The patient does not have a scheduled HOSFU appointment. Message sent to PCP staff for assistance with scheduling visit with patient.

## 2025-04-02 ENCOUNTER — OFFICE VISIT (OUTPATIENT)
Dept: CARDIOLOGY | Facility: CLINIC | Age: 70
End: 2025-04-02
Payer: MEDICARE

## 2025-04-02 VITALS
RESPIRATION RATE: 18 BRPM | WEIGHT: 163.13 LBS | BODY MASS INDEX: 20.93 KG/M2 | HEART RATE: 67 BPM | DIASTOLIC BLOOD PRESSURE: 82 MMHG | HEIGHT: 74 IN | SYSTOLIC BLOOD PRESSURE: 126 MMHG | OXYGEN SATURATION: 97 %

## 2025-04-02 DIAGNOSIS — T82.110A AICD LEAD MALFUNCTION: ICD-10-CM

## 2025-04-02 DIAGNOSIS — I49.9 ARRHYTHMIA: ICD-10-CM

## 2025-04-02 DIAGNOSIS — I44.2 CHB (COMPLETE HEART BLOCK): ICD-10-CM

## 2025-04-02 PROCEDURE — 99999 PR PBB SHADOW E&M-EST. PATIENT-LVL IV: CPT | Mod: PBBFAC,,, | Performed by: INTERNAL MEDICINE

## 2025-04-02 PROCEDURE — 99214 OFFICE O/P EST MOD 30 MIN: CPT | Mod: PBBFAC,PO | Performed by: INTERNAL MEDICINE

## 2025-04-02 NOTE — PROGRESS NOTES
CARDIOVASCULAR PROGRESS NOTE    REASON FOR CONSULT:   Marcos Elaine is a 69 y.o. male who presents for establishment of cardiology care.    He used to follow with Dr. Hanson and the last office visit was in 5/2023.    HISTORY OF PRESENT ILLNESS:     He has past medical history of complete heart block status post dual-chamber permanent pacemaker (3/2013) which was upgraded to CRT-P ([facility]) in October, 2019 (due to frequent RV pacing resulting in mild reduction in LVEF and heart failure symptoms).    He underwent LV lead revision and reimplantation on 3/25/2025 due to elevated LV lead impedance.  Postprocedure chest x-ray showed small-sized left apical pneumothorax and a chest tube was placed.  He was observed in CCU. Subsequent chest x-ray, 3 days later, showed no pneumothorax.    Today he comes for follow up.  He denies any chest pain, shortness of breath or palpitations.  No lower extremity edema.  No orthopnea or PND.  No lower extremity swelling.  Pacemaker wound site is healing well without any pain or swelling.    Does not smoke cigarettes.  No family history of premature CAD.    PAST MEDICAL HISTORY:     Past Medical History:   Diagnosis Date    2:1 Second degree AV block 03/05/2013    Cardiac pacemaker 03/06/2013    [facility]    Heart block 03/06/2013    S/p DC PM    Hyperlipidemia     Personal history of colonic polyps 12/30/2013    Normal 12/13 ---Repeat colonoscopy in 10 years for screening purposes. (Previous polyps were both hyperplastic)    Pneumothorax, postprocedural 03/25/2025       PAST SURGICAL HISTORY:     Past Surgical History:   Procedure Laterality Date    COLONOSCOPY N/A 1/22/2024    Procedure: COLONOSCOPY;  Surgeon: Israel Hartley MD;  Location: [facility] ([address] [place] [address]);  Service: Endoscopy;  Laterality: N/A;  Referred by Dr. Gonzalez, pt requesting Dr. Hartley, [facility] Pacemaker, PEG, portal -ml  1/16- precall complete - ERW    EXCISIONAL HEMORRHOIDECTOMY N/A 6/7/2022     Procedure: HEMORRHOIDECTOMY;  Surgeon: Israel Hartley MD;  Location: [address];  Service: Colon and Rectal;  Laterality: N/A;    HERNIA REPAIR      IMPLANTATION OF BIVENTRICULAR PERMANENT PACEMAKER AS UPGRADE TO EXISTING PACEMAKER N/A 10/14/2019    Procedure: Biventricular pacemaker upgrade;  Surgeon: Jarod Hanson MD;  Location: [facility] EP LAB;  Service: Cardiology;  Laterality: N/A;  NICM, CRT-P upgrade, SJM, MAC, MB, 3 Prep *SJM PPM in situ*    inguinal hernia repair      x 2    INSERT / REPLACE / REMOVE PACEMAKER  03/06/2013    S/p DC PM/ replace w/Biventricular pacemaker upgrade 10/14/19    REVISION OF PROCEDURE INVOLVING PACEMAKER LEAD Left 3/25/2025    Procedure: REVISION, PACEMAKER LEAD;  Surgeon: Jarod Hanson MD;  Location: [facility] EP LAB;  Service: Cardiology;  Laterality: Left;  Ld Mlfxn, CRT-P LV ld rev, +/- LBBAP, SJM, MAC, MB, 3 prep    SHOULDER SURGERY Left        ALLERGIES AND MEDICATION:   Review of patient's allergies indicates:  No Known Allergies     Medication List            Accurate as of April 2, 2025 11:47 AM. If you have any questions, ask your nurse or doctor.                CONTINUE taking these medications      glucosamine-chondroitin 500-400 mg tablet     KONSYL FIBER ORAL     LORazepam 0.5 MG tablet  Commonly known as: ATIVAN  TAKE 1 TABLET BY MOUTH EVERY 6 HOURS AS NEEDED FOR ANXIETY     multivitamin capsule     simvastatin 40 MG tablet  Commonly known as: ZOCOR  Take 1 tablet by mouth once daily              SOCIAL HISTORY:   Social History[1]    FAMILY HISTORY:     Family History   Problem Relation Name Age of Onset    Diabetes Mother      Cancer Mother      Cancer Father         REVIEW OF SYSTEMS:   Review of Systems   Constitutional:  Negative for chills and fever.   HENT:  Negative for ear pain, hearing loss and tinnitus.    Eyes:  Negative for blurred vision and double vision.   Respiratory:  Negative for cough and hemoptysis.    Cardiovascular:  Negative for  "chest pain, palpitations and orthopnea.   Gastrointestinal:  Negative for heartburn and nausea.   Genitourinary:  Negative for dysuria and urgency.   Musculoskeletal:  Negative for myalgias.   Neurological:  Negative for dizziness and headaches.   Psychiatric/Behavioral:  Negative for depression.            PHYSICAL EXAM:     Vitals:    04/02/25 1104   BP: 126/82   Pulse: 67   Resp: 18    Body mass index is 20.95 kg/m².  Weight: 74 kg (163 lb 2.3 oz)   Height: 6' 2" (188 cm)     Gen: NAD  Head/Eyes/Ears/Nose: MMM, good dentition   Neck: No carotid bruits, no JVD  Lung: Clear to auscultation bilaterally, no wheezes/rales/ronchi, symmetrical lung expansion with inspiration  Heart: Normal S1/S2, regular rate and rhythm, no murmurs/rubs/gallops  Abdomen: Soft, NT/ND, no masses  Extremities: No lower extremity edema.  No wounds or other skin lesions  Skin: Normal color and turgor. No wounds rashes, no petechia, no ecchymoses.   Neuro: AAOx3    DATA:     Laboratory:  CBC:  Recent Labs   Lab 03/20/25  1452 03/26/25  0834 03/28/25  0409   WBC 5.85 7.61 6.74   Hemoglobin 15.8  --   --    HGB  --  14.5 14.3   Hematocrit 48.1  --   --    HCT  --  44.1 42.9   Platelet Count  --  207 204   Platelets 238  --   --        CHEMISTRIES:  Recent Labs   Lab 12/12/22  0752 12/12/23  0700 02/15/25  0830 03/20/25  1452 03/26/25  0834 03/28/25  0409 03/29/25  0444   Glucose 95 98 101 76  --   --   --    Sodium 141 142 139 141 138 139 139   Potassium 4.3 4.5 4.5 4.1 3.8 3.8 3.8   BUN 13 12 11 12 10 14 13   Creatinine 0.8 0.9 0.9 0.9 0.7 0.7 0.7   eGFR >60.0 >60.0 >60.0 >60.0 >60 >60 >60   Calcium 9.4 9.3 9.3 9.4 8.8 8.8 8.8       CARDIAC BIOMARKERS:        HBA1C:  Hemoglobin A1C   Date Value Ref Range Status   07/16/2020 5.6 4.0 - 5.6 % Final     Comment:     ADA Screening Guidelines:  5.7-6.4%  Consistent with prediabetes  >or=6.5%  Consistent with diabetes  High levels of fetal hemoglobin interfere with the HbA1C  assay. Heterozygous " hemoglobin variants (HbS, HgC, etc)do  not significantly interfere with this assay.   However, presence of multiple variants may affect accuracy.     2019 5.6 4.0 - 5.6 % Final     Comment:     ADA Screening Guidelines:  5.7-6.4%  Consistent with prediabetes  >or=6.5%  Consistent with diabetes  High levels of fetal hemoglobin interfere with the HbA1C  assay. Heterozygous hemoglobin variants (HbS, HgC, etc)do  not significantly interfere with this assay.   However, presence of multiple variants may affect accuracy.          COAGS:  Recent Labs   Lab 25  1452   INR 1.0       LIPIDS/LFTS:  Recent Labs   Lab 22  0752 23  0700 02/15/25  0830 25  0834   Cholesterol 162 263 H 180  --    Triglycerides 66 115 88  --    HDL 69 64 65  --    LDL Cholesterol 79.8 176.0 H 97.4  --    Non-HDL Cholesterol 93 199 115  --    AST 21 20 48 H 21   ALT 21 17 25 18         CARDIAC DIAGNOSTICS:  :     [id number]  A sensed in Bi V paced rhythm    ECHO  2021  The left ventricle is normal in size with concentric remodeling and normal systolic function. The estimated ejection fraction is 58%.  Normal left ventricular diastolic function.  Normal right ventricular size with normal right ventricular systolic function.  Intermediate central venous pressure (8 mmHg).  The estimated PA systolic pressure is 30 mmHg.    2019  Mildly decreased left ventricular systolic function. The estimated ejection fraction is 45%  Global hypokinetic wall motion.  Mild left atrial enlargement.  Grade I (mild) left ventricular diastolic dysfunction consistent with impaired relaxation.  Normal left atrial pressure.  Normal right ventricular systolic function.  Normal central venous pressure (3 mm Hg).    2018    1 - Low normal to mildly depressed left ventricular systolic function (EF 50-55%).     2 - Normal right ventricular systolic function .     3 - Trivial aortic regurgitation.     4 - Mild mitral regurgitation.     5 -  Trivial tricuspid regurgitation.     6 - Mild left atrial enlargement.     3/2013    1 - Normal left ventricular function (EF 65%).     2 - Trivial tricuspid regurgitation.     3 - Intermediate central venous pressure.     3.  STRESS TEST  NA    4.  CARDIAC CATHETERIZATION  NA    5.  IMAGING   CT chest in  showed moderate aortic atherosclerosis    6. OTHERS  LDL 97.4 (2025)  A1C 5.6 (2020)    The 10-year ASCVD risk score (Sacha CHAMBERLAIN, et al., 2019) is: 16.1%    Values used to calculate the score:      Age: 69 years      Sex: Male      Is Non- : No      Diabetic: No      Tobacco smoker: No      Systolic Blood Pressure: 126 mmHg      Is BP treated: No      HDL Cholesterol: 65 mg/dL      Total Cholesterol: 180 mg/dL      ASSESSMENT:   Complete heart block status post dual-chamber pacemaker placement ([facility] - ) -> upgraded to CRT P due to mild reduction in LV systolic function and heart failure symptoms () -> LV lead revision and reimplantation due to elevated lead impedance ()  Moderate aortic calcification  Hyperlipidemia    Doing well from cardiac perspective.  Had mild reduction in LV systolic function due to frequent RV pacing.  Underwent CRT-P implantation in .  Subsequently, LV function normalized.    PLAN:   Start baby aspirin  Continue simvastatin 40 mg daily  Routine EP follow up for PPM interrogation  Mediterranean diet and daily light exercise recommended    Follow up in 6 months    Radha Arshad MD  [facility]      This note was created by combination of typed  and M-Modal dictation.   Transcription errors may be present.            [1]   Social History  Socioeconomic History    Marital status:    Tobacco Use    Smoking status: Former     Types: Cigars     Quit date: 1973     Years since quittin.7     Passive exposure: Past    Smokeless tobacco: Never    Tobacco comments:     Patient Quit Smoking  Cigars on 07/23/1973.   Substance and Sexual Activity    Alcohol use: Yes     Alcohol/week: 7.0 standard drinks of alcohol     Types: 5 Glasses of wine, 2 Cans of beer per week     Comment: Daily 1-2    Drug use: No    Sexual activity: Yes     Partners: Female     Social Drivers of Health     Financial Resource Strain: Low Risk  (3/31/2025)    Overall Financial Resource Strain (CARDIA)     Difficulty of Paying Living Expenses: Not hard at all   Food Insecurity: No Food Insecurity (3/31/2025)    Hunger Vital Sign     Worried About Running Out of Food in the Last Year: Never true     Ran Out of Food in the Last Year: Never true   Transportation Needs: No Transportation Needs (3/31/2025)    PRAPARE - Transportation     Lack of Transportation (Medical): No     Lack of Transportation (Non-Medical): No   Physical Activity: Insufficiently Active (3/31/2025)    Exercise Vital Sign     Days of Exercise per Week: 7 days     Minutes of Exercise per Session: 20 min   Stress: No Stress Concern Present (3/31/2025)    [employer] [place] [employer] - Occupational Stress Questionnaire     Feeling of Stress : Not at all   Housing Stability: Low Risk  (3/31/2025)    Housing Stability Vital Sign     Unable to Pay for Housing in the Last Year: No     Number of Times Moved in the Last Year: 0     Homeless in the Last Year: No

## 2025-04-08 ENCOUNTER — OFFICE VISIT (OUTPATIENT)
Dept: FAMILY MEDICINE | Facility: CLINIC | Age: 70
End: 2025-04-08
Payer: MEDICARE

## 2025-04-08 VITALS
TEMPERATURE: 98 F | HEART RATE: 60 BPM | WEIGHT: 164 LBS | OXYGEN SATURATION: 99 % | HEIGHT: 74 IN | SYSTOLIC BLOOD PRESSURE: 134 MMHG | DIASTOLIC BLOOD PRESSURE: 78 MMHG | BODY MASS INDEX: 21.05 KG/M2

## 2025-04-08 DIAGNOSIS — Z12.5 SCREENING FOR PROSTATE CANCER: ICD-10-CM

## 2025-04-08 DIAGNOSIS — F43.9 SITUATIONAL STRESS: ICD-10-CM

## 2025-04-08 DIAGNOSIS — Z95.0 PRESENCE OF CARDIAC RESYNCHRONIZATION THERAPY PACEMAKER (CRT-P): Primary | ICD-10-CM

## 2025-04-08 DIAGNOSIS — I70.0 AORTIC ATHEROSCLEROSIS: ICD-10-CM

## 2025-04-08 DIAGNOSIS — Z86.79 HISTORY OF CARDIOMYOPATHY: ICD-10-CM

## 2025-04-08 DIAGNOSIS — Z87.09 HISTORY OF PNEUMOTHORAX: ICD-10-CM

## 2025-04-08 DIAGNOSIS — E78.5 HYPERLIPIDEMIA, UNSPECIFIED HYPERLIPIDEMIA TYPE: ICD-10-CM

## 2025-04-08 DIAGNOSIS — R97.20 RISING PSA LEVEL: ICD-10-CM

## 2025-04-08 DIAGNOSIS — I44.2 CHB (COMPLETE HEART BLOCK): ICD-10-CM

## 2025-04-08 DIAGNOSIS — Z86.0100 HISTORY OF COLONIC POLYPS: ICD-10-CM

## 2025-04-08 PROCEDURE — G2211 COMPLEX E/M VISIT ADD ON: HCPCS | Mod: S$PBB,,, | Performed by: INTERNAL MEDICINE

## 2025-04-08 PROCEDURE — 99214 OFFICE O/P EST MOD 30 MIN: CPT | Mod: S$PBB,,, | Performed by: INTERNAL MEDICINE

## 2025-04-08 PROCEDURE — 99999 PR PBB SHADOW E&M-EST. PATIENT-LVL III: CPT | Mod: PBBFAC,,, | Performed by: INTERNAL MEDICINE

## 2025-04-08 PROCEDURE — 99213 OFFICE O/P EST LOW 20 MIN: CPT | Mod: PBBFAC,PO | Performed by: INTERNAL MEDICINE

## 2025-04-08 RX ORDER — SIMVASTATIN 40 MG/1
40 TABLET, FILM COATED ORAL DAILY
Qty: 90 TABLET | Refills: 3 | Status: CANCELLED | OUTPATIENT
Start: 2025-04-08

## 2025-04-08 RX ORDER — SIMVASTATIN 40 MG/1
40 TABLET, FILM COATED ORAL DAILY
Qty: 90 TABLET | Refills: 3 | Status: SHIPPED | OUTPATIENT
Start: 2025-04-08

## 2025-04-08 RX ORDER — ASPIRIN 81 MG/1
81 TABLET ORAL DAILY
COMMUNITY

## 2025-04-08 NOTE — PROGRESS NOTES
Chief complaint: hosp f/u      Physical exam, 2/25    69-year-old white male.  From and health maintenance standpoint we reviewed his  labs.  His colonoscopy was normal in 2013 with a 10 year follow-up since previous polyps were hyperplastic. 2 polyps 1/24- 1 adenoma- 5-7 yrs. Labs reviewed and unremarkable x LDL .  He is feeling good.    Interval procedure report and out of curiosity you might want to discuss with the physician that put in his pacemaker exactly how the pneumothorax occurred but discussed it is always a significant possibility with such procedures.  He is doing well.  Pain is gone and no breathing problems.      You did meet with Cardiology who suggested Zocor is an older medication but it looks like he is doing great and we reviewed his lipid profiles with his LDL below 100 and he has not had any problems on Zocor.  He was on Lipitor in the past but changed only due to cost at that time.  Lipitor would definitely be an option going forward especially if his LDL goal needed to be lower.  He is inclined to stay on the Zocor.  We discussed that it could also be changed in the future if there is ever any future meds that would cause drug interactions.  Refill sent.    Also reviewed the slight rise in his PSA which is probably not clinically significant.  His father had BPH but no cancer.  He is willing to recheck it in six months just to trend it so I put that order in.  We discussed not having any sexual intercourse tonight prior to the lab work.      3/18  HPI:   69 yoM CHB s/p DC PM with upgrade to CRT-P 10/14/19 ([facility]) with improvement in EF. He is here today for LV lead revision and reimplant. Procedure complicated by apical pneumothorax noted on the post-procedure CXR. Patient is now s/p thoracenrtesis and is being admitted to CICU for close monitoring.   REVISION, PACEMAKER LEAD (Left)   Hospital Course:  Chest tube placed in the PACU yesterday after PTX s/p PPM battery and lead change on  03/25/2025. CT chest no evidence of COPD/emp      Seen cards  Complete heart block status post dual-chamber pacemaker placement ([facility] - 2013) -> upgraded to CRT P due to mild reduction in LV systolic function and heart failure symptoms (2019) -> LV lead revision and reimplantation due to elevated lead impedance (2025)  Moderate aortic calcification  Hyperlipidemia     Doing well from cardiac perspective.  Had mild reduction in LV systolic function due to frequent RV pacing.  Underwent CRT-P implantation in 2019.  Subsequently, LV function normalized.     PLAN:   Start baby aspirin  Continue simvastatin 40 mg daily  Routine EP follow up for PPM interrogation  Mediterranean diet and daily light exercise recommended   Follow up in 6 months   Radha Arshad MD  [facility]      LDL is up in past but great 2025.  -? Off med just to see and now back on it. No problem reassured about statin therapy is okay to continue.      Patient does report he thinks he is claustrophobic.  He can not sit in the back seat of a car about a lot of anxiety, elevators are okay if the ride is short and not crowded.  He is concerned about riding on plain or a bus in his wife does want to start traveling.  1 time he had to do some form of cardiac scan was given something which we presume was probably something like Valium it did work well but started to wear off.  We did discuss looking up cognitive behavioral therapies that he can do on his own and if severe we always could refer to psychology.  Will also use some Ativan and had a long discussion regarding dose adjustments based upon with situation, based upon symptoms and response and so forth.  I encouraged him to try it and start putting himself in those particular situations that he no should precipitate claustrophobia and anxiety. Ativan works well prn, dentist, airplane etc.  Reassured him about its use and if he tries one pill before getting on the plane  and has significant anxiety on the plane he can take another.    He does report his penis is curving up but no remembered injury and not causing any pain with the erection so it is not in any acute phase that would warrant treatment at this time and it is not preventing him from sexual intercourse so we discussed that if it becomes a problem I would refer him to a specific urologist.  Probably Peyronie's disease.    PSA 2/25 rev ---3    ROS:   CONST: weight stable. EYES: no vision change. ENT: no sore throat. CV: no chest pain w/ exertion. RESP: no shortness of breath. GI: no nausea, vomiting, diarrhea. No dysphagia. : no urinary issues. MUSCULOSKELETAL: no new myalgias or arthralgias. SKIN: no new changes. NEURO: no focal deficits. PSYCH: no new issues. ENDOCRINE: no polyuria. HEME: no lymph nodes. ALLERGY: no general pruritis.          Past Medical History:   Diagnosis Date    2:1 Second degree AV block 03/05/2013    Cardiac pacemaker 03/06/2013    [facility]    Heart block 03/06/2013    S/p DC PM    Hyperlipidemia     Personal history of colonic polyps 12/30/2013    Normal 12/13 ---Repeat colonoscopy in 10 years for screening purposes. (Previous polyps were both hyperplastic)   2 polyps 1/24. 1 adenoma- 5-7 yrs   Diverticulitis  Occasional GERD          Past Surgical History:   Procedure Laterality Date    COLONOSCOPY N/A 1/22/2024    Procedure: COLONOSCOPY;  Surgeon: Israel Hartley MD;  Location: [facility] (4TH [facility]);  Service: Endoscopy;  Laterality: N/A;  Referred by Dr. Gonzalez, pt requesting Dr. Hartley, [facility] Pacemaker, PEG, portal -ml  1/16- precall complete - ERW    EXCISIONAL HEMORRHOIDECTOMY N/A 6/7/2022    Procedure: HEMORRHOIDECTOMY;  Surgeon: Israel Hartley MD;  Location: [facility] OR 2ND FLR;  Service: Colon and Rectal;  Laterality: N/A;    HERNIA REPAIR      IMPLANTATION OF BIVENTRICULAR PERMANENT PACEMAKER AS UPGRADE TO EXISTING PACEMAKER N/A 10/14/2019    Procedure:  Biventricular pacemaker upgrade;  Surgeon: Jarod Hanson MD;  Location: [facility] EP LAB;  Service: Cardiology;  Laterality: N/A;  NICM, CRT-P upgrade, SJM, MAC, MB, 3 Prep *SJM PPM in situ*    inguinal hernia repair      x 2    INSERT / REPLACE / REMOVE PACEMAKER  03/06/2013    S/p DC PM/ replace w/Biventricular pacemaker upgrade 10/14/19    REVISION OF PROCEDURE INVOLVING PACEMAKER LEAD Left 3/25/2025    Procedure: REVISION, PACEMAKER LEAD;  Surgeon: Jarod Hanson MD;  Location: [facility] EP LAB;  Service: Cardiology;  Laterality: Left;  Ld Mlfxn, CRT-P LV ld rev, +/- LBBAP, ANDREW, MAC, MB, 3 prep    SHOULDER SURGERY Left        Social History     Social History    Marital status:      Spouse name: N/A    Number of children: 3 boys in college    Years of education: N/A     Occupational History    realator     Social History Main Topics    Smoking status: Former Smoker     Types: Cigars     Quit date: 7/23/1973    Smokeless tobacco: Never Used    Alcohol use 6.0 oz/week     5 Glasses of wine, 5 Cans of beer per week      Comment: Daily    Drug use: No    Sexual activity: Not on file     Other Topics Concern    Not on file     Social History Narrative       family history includes Cancer in his father and mother; Diabetes in his mother.       Gen: no distress  Exam otherwise deferred      Diagnoses and all orders for this visit:    Presence of cardiac resynchronization therapy pacemaker (CRT-P), feeling better after leads and pacemaker replace    History of pneumothorax, reassured    CHB (complete heart block), status post pacemaker    Rising PSA level, slight rise and he does not want to check in his six-month  -     Prostate Specific Antigen, Diagnostic; Future    Screening for prostate cancer  -     Prostate Specific Antigen, Diagnostic; Future    Hyperlipidemia, unspecified hyperlipidemia type, doing well on Zocor  -     simvastatin (ZOCOR) 40 MG tablet; Take 1 tablet (40 mg total) by mouth once  daily.    Aortic atherosclerosis, continues on statin    Situational stress, chronic and stable    History of colonic polyps, chronic and stable    History of cardiomyopathy - recovered after CRT , chronic and stable    Other orders  The following orders have not been finalized:  -     Cancel: simvastatin (ZOCOR) 40 MG tablet

## 2025-06-17 ENCOUNTER — OFFICE VISIT (OUTPATIENT)
Dept: OPTOMETRY | Facility: CLINIC | Age: 70
End: 2025-06-17
Payer: MEDICARE

## 2025-06-17 DIAGNOSIS — H25.13 NUCLEAR SCLEROSIS, BILATERAL: Primary | ICD-10-CM

## 2025-06-17 DIAGNOSIS — H40.013 OAG (OPEN ANGLE GLAUCOMA) SUSPECT, LOW RISK, BILATERAL: ICD-10-CM

## 2025-06-17 PROCEDURE — 99999 PR PBB SHADOW E&M-EST. PATIENT-LVL II: CPT | Mod: PBBFAC,,, | Performed by: OPTOMETRIST

## 2025-06-17 PROCEDURE — 92014 COMPRE OPH EXAM EST PT 1/>: CPT | Mod: S$PBB,,, | Performed by: OPTOMETRIST

## 2025-06-17 PROCEDURE — 99212 OFFICE O/P EST SF 10 MIN: CPT | Mod: PBBFAC,PO | Performed by: OPTOMETRIST

## 2025-06-17 NOTE — PROGRESS NOTES
Mr. Elaine is a patient of Dr. Hanson and was last seen in clinic 2/26/2025.      Subjective:   Patient ID:  Marcos Elaine is a 69 y.o. male who presents for follow up of Pacemaker Check  .     HPI:    Mr. Elaine is a 69 y.o. male with CHD, CRT-P here for follow up after lead revision and generator change.    Background:    67 yoM CHB s/p DC PM here for follow up after upgrade to CRT-P 10/14/19. He had 2:1 AVB s/p DC PM 3/6/74188. He progressed from intermittent RV pacing to 100% RV pacing over the past 2 years. He had change in EF from 55% to 45% with development of HF symptoms. PET stress was negative for ischemia. He went for CRT-P upgrade 10/14/19.     1/21/2020: Since upgrade he has had improvement in HF symptoms. Normal CRT-P parameters today. No sustained arrhythmias.     2/21: Stable symptoms. Normal CRT-P function    5/10/2022: Normal CRT-P function with no underlying. No sustained arrhythmias.   66 yoM CHB s/p CRT-P here for routine follow up. Normal CRT-P function with no sustained arrhythmias. I discussed routine device follow up including quarterly to bi-annual device checks for device function as well as yearly follow up in the EP clinic. The patient  was advised to call with any concerns regarding their device. Device clinic follow up as scheduled. RTC 1y    10/11/2022: ALERT via M for LV lead impedance > 3000 Ohms  Device check: LV lead impedances >3000 Ohms in any configuration using proximal electrode  Tested LV lead in multiple configurations, opted to use D1-M2: impedance 860 Ohms, capture threshold 1.75V @ 0.40ms.  EKG in this configuration shows QRSd of 164ms  Reprogramming at this visit: Changed LV configuration to D1-M2, adjusted LV Cap Pacing Margin to +0.5V, narrowed impedance monitoring to 1500 Ohms    5/18/2023: Mr. Elaine is doing well from a device perspective with stable lead and device function. LV lead config changed in October 2022 due to increased impedence. Parameters now  remain stable. No arrhythmia noted. 100% biventricular pacing. No CHF symptoms. Echo 2/2021 showed recovery of LV function. He is feeling well.    2/26/2025: Mr. Elaine is doing well from a device perspective with stable lead and device function. No sustained arrhythmia noted. 100% biventricular pacing. ECG showing stable QRS. No CHF symptoms. Doing well. 3 yrs to [facility].      Update (06/24/2025):    3/20/2025: Remote transmission 3/20/25 received for elevated LV impedance of 2550 ohms. LV impedance was 1075 ohms on 2/26/2025.     3/25/2025:    Successful device revision with placement of a new LBAP lead and capping of existing LV lead.    Generator change to accomodate bioplar LBAP lead    Today he says he is feeling well. Somewhat better s/p LBBA lead. More energy. More active. No worsening PAUL, palps, CP, LH, syncope reported.    Device Interrogation (6/24/2025) reveals an intrinsic SR with CHB with stable lead and device function. LV= LBB set to UNIPOLAR pacing. LBBA pacing lead impedance bipolar 610 ohms, unipolar 390 ohms; LBBA capture threshold bipolar 1.0 V @ 0.4 ms, unipolar 0.75 V @ 0.4 ms. Atrial arrhythmias: 0% AF burden. AMS x 17. Max duration x 1 min 28sec. Egrams c/w RA noise and NSAT. Ventricular arrhythmias: none. He paces 24% in the RA and 99% in the LBBA. PVC 1%. Estimated battery longevity 5.1-5.8 years. Reprogramming at this visit: LV output changed from 3.5 V --> 2.0 V; LV autocapture turned on.     I have personally reviewed the patient's EKG today, which shows ASVP at 63bpm. NM interval is 188. QRS is 142. QTc is 489.    Relevant Cardiac Test Results:    2D Echo (2/19/2021):  The left ventricle is normal in size with concentric remodeling and normal systolic function. The estimated ejection fraction is 58%.  Normal left ventricular diastolic function.  Normal right ventricular size with normal right ventricular systolic function.  Intermediate central venous pressure (8 mmHg).  The estimated PA  "systolic pressure is 30 mmHg.    Current Outpatient Medications   Medication Sig    aspirin (ECOTRIN) 81 MG EC tablet Take 81 mg by mouth once daily.    CALCIUM POLYCARBOPHIL (KONSYL FIBER ORAL) Take by mouth.    glucosamine-chondroitin 500-400 mg tablet Take 1 tablet by mouth 3 (three) times daily.    LORazepam (ATIVAN) 0.5 MG tablet TAKE 1 TABLET BY MOUTH EVERY 6 HOURS AS NEEDED FOR ANXIETY    multivitamin capsule Take 1 capsule by mouth once daily.    simvastatin (ZOCOR) 40 MG tablet Take 1 tablet (40 mg total) by mouth once daily.     No current facility-administered medications for this visit.     Facility-Administered Medications Ordered in Other Visits   Medication    0.9%  NaCl infusion    0.9%  NaCl infusion    mupirocin 2 % ointment       Review of Systems   Constitutional: Negative for malaise/fatigue.   Cardiovascular:  Negative for chest pain, dyspnea on exertion, irregular heartbeat, leg swelling and palpitations.   Respiratory:  Negative for shortness of breath.    Hematologic/Lymphatic: Negative for bleeding problem.   Skin:  Negative for rash.   Musculoskeletal:  Negative for myalgias.   Gastrointestinal:  Negative for hematemesis, hematochezia and nausea.   Genitourinary:  Negative for hematuria.   Neurological:  Negative for light-headedness.   Psychiatric/Behavioral:  Negative for altered mental status.    Allergic/Immunologic: Negative for persistent infections.       Objective:          /70 (Patient Position: Sitting)   Pulse 63   Ht 6' 2" (1.88 m)   Wt 75.3 kg (166 lb 0.1 oz)   BMI 21.31 kg/m²     Physical Exam  Vitals and nursing note reviewed.   Constitutional:       Appearance: Normal appearance. He is well-developed.   HENT:      Head: Normocephalic.      Nose: Nose normal.   Eyes:      Pupils: Pupils are equal, round, and reactive to light.   Cardiovascular:      Rate and Rhythm: Normal rate and regular rhythm.   Pulmonary:      Effort: No respiratory distress.   Chest:      " Comments: Device to LUCW. Incision and pocket in good repair.    Musculoskeletal:         General: Normal range of motion.   Skin:     General: Skin is warm and dry.      Findings: No erythema.   Neurological:      Mental Status: He is alert and oriented to person, place, and time.   Psychiatric:         Speech: Speech normal.         Behavior: Behavior normal.           Lab Results   Component Value Date     03/29/2025     03/20/2025    K 3.8 03/29/2025    K 4.1 03/20/2025    MG 2.5 03/05/2013    BUN 13 03/29/2025    CREATININE 0.7 03/29/2025    ALT 18 03/26/2025    ALT 25 02/15/2025    AST 21 03/26/2025    AST 48 (H) 02/15/2025    HGB 14.3 03/28/2025    HGB 15.8 03/20/2025    HCT 42.9 03/28/2025    HCT 48.1 03/20/2025    TSH 1.089 02/15/2025    LDLCALC 97.4 02/15/2025       Recent Labs   Lab 03/20/25  1452   INR 1.0       Assessment:     1. Presence of cardiac resynchronization therapy pacemaker (CRT-P) - LBBA pacing lead    2. History of cardiomyopathy - recovered after CRT     3. CHB (complete heart block)      Plan:     In summary, Mr. Elaine is a 69 y.o. male with CHD, CRT-P here for follow up after lead revision and generator change.  He is 3 mo s/p lead revision with capping of malfunctioned LV lead and implantation of LBBAP lead and generator change.   Mr. Elaine is doing well from a device perspective with stable lead and device function. LBBA lead parameters stable. ECG shows stable morphology and LVAT. 99% LBBA paced.   No clinically significant arrhythmia noted. He reports symptom improvement since lead change. Doing well.     Continue current medication regimen and device settings.   Follow up in device clinic as scheduled.   Follow up in EP clinic in 6 mo, sooner as needed.     *A copy of this note has been sent to Dr. Hanson*    Follow up in about 6 months (around 12/24/2025).      ------------------------------------------------------------------    SERENA Carbajal,  NP-C  Cardiac Electrophysiology

## 2025-06-17 NOTE — PROGRESS NOTES
HPI    Here for annual eye exam     Eye meds: none    69 year old male states left eye is more blurry than right. States he   doesn't have trouble driving at night, but notices bright head lights.   Denies flashes, floaters or diplopia. Denies itchy eyes. Denies ocular   pain.   Last edited by Penny Zavala on 6/17/2025  1:06 PM.            Assessment /Plan     For exam results, see Encounter Report.    Nuclear sclerosis, bilateral  -Educated patient on presence of cataracts at today's exam, monitor at annual dilated fundus exam. 5+ years surgical estimate.    OAG (open angle glaucoma) suspect, low risk, bilateral  -Monitor annual  -no imaging available today    RTC 1 yr

## 2025-06-22 ENCOUNTER — CLINICAL SUPPORT (OUTPATIENT)
Dept: CARDIOLOGY | Facility: HOSPITAL | Age: 70
End: 2025-06-22
Attending: INTERNAL MEDICINE
Payer: MEDICARE

## 2025-06-22 ENCOUNTER — CLINICAL SUPPORT (OUTPATIENT)
Dept: CARDIOLOGY | Facility: HOSPITAL | Age: 70
End: 2025-06-22
Payer: MEDICARE

## 2025-06-22 DIAGNOSIS — I44.2 ATRIOVENTRICULAR BLOCK, COMPLETE: ICD-10-CM

## 2025-06-22 DIAGNOSIS — Z95.0 PRESENCE OF CARDIAC PACEMAKER: ICD-10-CM

## 2025-06-22 PROCEDURE — 93294 REM INTERROG EVL PM/LDLS PM: CPT | Mod: ,,, | Performed by: INTERNAL MEDICINE

## 2025-06-24 ENCOUNTER — CLINICAL SUPPORT (OUTPATIENT)
Dept: CARDIOLOGY | Facility: HOSPITAL | Age: 70
End: 2025-06-24
Attending: INTERNAL MEDICINE
Payer: MEDICARE

## 2025-06-24 ENCOUNTER — HOSPITAL ENCOUNTER (OUTPATIENT)
Dept: CARDIOLOGY | Facility: CLINIC | Age: 70
Discharge: HOME | End: 2025-06-24
Payer: MEDICARE

## 2025-06-24 ENCOUNTER — OFFICE VISIT (OUTPATIENT)
Dept: ELECTROPHYSIOLOGY | Facility: CLINIC | Age: 70
End: 2025-06-24
Payer: MEDICARE

## 2025-06-24 VITALS
BODY MASS INDEX: 21.3 KG/M2 | WEIGHT: 166 LBS | HEART RATE: 63 BPM | DIASTOLIC BLOOD PRESSURE: 70 MMHG | HEIGHT: 74 IN | SYSTOLIC BLOOD PRESSURE: 130 MMHG

## 2025-06-24 DIAGNOSIS — Z95.0 PRESENCE OF CARDIAC RESYNCHRONIZATION THERAPY PACEMAKER (CRT-P): Primary | ICD-10-CM

## 2025-06-24 DIAGNOSIS — I44.2 CHB (COMPLETE HEART BLOCK): ICD-10-CM

## 2025-06-24 DIAGNOSIS — T82.110A AICD LEAD MALFUNCTION: ICD-10-CM

## 2025-06-24 DIAGNOSIS — Z86.79 HISTORY OF CARDIOMYOPATHY: ICD-10-CM

## 2025-06-24 DIAGNOSIS — Z95.0 PRESENCE OF CARDIAC RESYNCHRONIZATION THERAPY PACEMAKER (CRT-P): ICD-10-CM

## 2025-06-24 LAB
OHS QRS DURATION: 142 MS
OHS QTC CALCULATION: 489 MS

## 2025-06-24 PROCEDURE — 99214 OFFICE O/P EST MOD 30 MIN: CPT | Mod: S$PBB,,, | Performed by: NURSE PRACTITIONER

## 2025-06-24 PROCEDURE — 99213 OFFICE O/P EST LOW 20 MIN: CPT | Mod: PBBFAC,25 | Performed by: NURSE PRACTITIONER

## 2025-06-24 PROCEDURE — 93281 PM DEVICE PROGR EVAL MULTI: CPT

## 2025-06-24 PROCEDURE — 93005 ELECTROCARDIOGRAM TRACING: CPT | Mod: PBBFAC | Performed by: INTERNAL MEDICINE

## 2025-06-24 PROCEDURE — 99999 PR PBB SHADOW E&M-EST. PATIENT-LVL III: CPT | Mod: PBBFAC,,, | Performed by: NURSE PRACTITIONER

## 2025-06-24 PROCEDURE — 93010 ELECTROCARDIOGRAM REPORT: CPT | Mod: S$PBB,,, | Performed by: INTERNAL MEDICINE

## 2025-07-18 LAB
OHS CV AF BURDEN PERCENT: < 1
OHS CV BIV PACING PERCENT: 99 %
OHS CV DC REMOTE DEVICE TYPE: NORMAL

## 2025-07-28 DIAGNOSIS — Z00.00 ENCOUNTER FOR MEDICARE ANNUAL WELLNESS EXAM: ICD-10-CM

## (undated) DEVICE — SHEATH PRELUDE 9X13CM SNAP

## (undated) DEVICE — PACK PACER PERMANENT OMC

## (undated) DEVICE — PACK PACER PERMANENT

## (undated) DEVICE — GOWN SMART IMP BREATHABLE XXLG

## (undated) DEVICE — SLING SWATHE UNIVERSAL FOAM

## (undated) DEVICE — Device

## (undated) DEVICE — TRAY MINOR GEN SURG

## (undated) DEVICE — KIT WRENCH

## (undated) DEVICE — PENCIL SMK EVAC CONNECTOR 10FT

## (undated) DEVICE — WIRE WHISPER MS 014 X 190

## (undated) DEVICE — CATH CPS LOCATOR TM 3D LARGE

## (undated) DEVICE — CATH BLLN COR SINUS VENOGRAPHY

## (undated) DEVICE — GUIDEWIRE CPS DIRECT 54CM 7FR

## (undated) DEVICE — DRESSING AQUACEL FOAM 5 X 5

## (undated) DEVICE — TAPE SILK 3IN

## (undated) DEVICE — WIRE GUIDE WHOLEY MOD J .035

## (undated) DEVICE — INTRODUCER HEMOSTASIS 6.5FR

## (undated) DEVICE — SHEATH SAFE ULTRA 9FR

## (undated) DEVICE — PAD DEFIB CADENCE ADULT R2

## (undated) DEVICE — COVER INSTR ELASTIC BAND 40X20

## (undated) DEVICE — SEE MEDLINE ITEM 154981

## (undated) DEVICE — BOVIE SUCTION

## (undated) DEVICE — DEVICE PLASMABLADE X 3.0S LT

## (undated) DEVICE — TIP YANKAUERS BULB NO VENT

## (undated) DEVICE — ELECTRODE REM PLYHSV RETURN 9

## (undated) DEVICE — SEE MEDLINE ITEM 157148

## (undated) DEVICE — CATH CPS LOCATOR TM 3D MED

## (undated) DEVICE — TOOL HELIX LOCK ADJUST

## (undated) DEVICE — DRAPE INCISE IOBAN 2 23X17IN

## (undated) DEVICE — BRIEF STRTCH MESH XX-LG

## (undated) DEVICE — LUBRICANT SURGILUBE 2 OZ

## (undated) DEVICE — ADHESIVE DERMABOND ADVANCED

## (undated) DEVICE — GAUZE SPONGE 4X4 12PLY

## (undated) DEVICE — CLAMP TOWEL EASY RELEASE TAB